# Patient Record
Sex: MALE | Race: WHITE | Employment: FULL TIME | URBAN - METROPOLITAN AREA
[De-identification: names, ages, dates, MRNs, and addresses within clinical notes are randomized per-mention and may not be internally consistent; named-entity substitution may affect disease eponyms.]

---

## 2017-01-05 ENCOUNTER — HOSPITAL ENCOUNTER (OUTPATIENT)
Dept: PHYSICAL THERAPY | Age: 62
Discharge: HOME | End: 2017-01-05
Admitting: ORTHOPAEDIC SURGERY

## 2017-01-13 ENCOUNTER — OFFICE VISIT (OUTPATIENT)
Dept: ORTHOPEDIC SURGERY | Age: 62
End: 2017-01-13

## 2017-01-13 ENCOUNTER — HOSPITAL ENCOUNTER (OUTPATIENT)
Dept: PHYSICAL THERAPY | Age: 62
Discharge: HOME | End: 2017-01-13
Admitting: ORTHOPAEDIC SURGERY

## 2017-01-13 VITALS
HEART RATE: 89 BPM | HEIGHT: 70 IN | SYSTOLIC BLOOD PRESSURE: 130 MMHG | BODY MASS INDEX: 26.48 KG/M2 | DIASTOLIC BLOOD PRESSURE: 80 MMHG | WEIGHT: 185 LBS

## 2017-01-13 DIAGNOSIS — Z98.890 S/P ROTATOR CUFF REPAIR: Primary | ICD-10-CM

## 2017-01-13 PROCEDURE — 99024 POSTOP FOLLOW-UP VISIT: CPT | Performed by: ORTHOPAEDIC SURGERY

## 2017-01-16 ENCOUNTER — OFFICE VISIT (OUTPATIENT)
Dept: FAMILY MEDICINE CLINIC | Age: 62
End: 2017-01-16

## 2017-01-16 VITALS
DIASTOLIC BLOOD PRESSURE: 80 MMHG | SYSTOLIC BLOOD PRESSURE: 140 MMHG | OXYGEN SATURATION: 96 % | HEART RATE: 93 BPM | WEIGHT: 182.25 LBS | RESPIRATION RATE: 12 BRPM | BODY MASS INDEX: 26.15 KG/M2

## 2017-01-16 DIAGNOSIS — L03.211 CELLULITIS AND ABSCESS OF FACE: Primary | ICD-10-CM

## 2017-01-16 DIAGNOSIS — L02.01 CELLULITIS AND ABSCESS OF FACE: Primary | ICD-10-CM

## 2017-01-16 PROCEDURE — 99213 OFFICE O/P EST LOW 20 MIN: CPT | Performed by: FAMILY MEDICINE

## 2017-01-16 RX ORDER — SULFAMETHOXAZOLE AND TRIMETHOPRIM 800; 160 MG/1; MG/1
1 TABLET ORAL 2 TIMES DAILY
Qty: 14 TABLET | Refills: 0 | Status: SHIPPED | OUTPATIENT
Start: 2017-01-16 | End: 2017-01-23

## 2017-01-20 ENCOUNTER — HOSPITAL ENCOUNTER (OUTPATIENT)
Dept: PHYSICAL THERAPY | Age: 62
Discharge: HOME | End: 2017-01-20
Admitting: ORTHOPAEDIC SURGERY

## 2017-01-27 ENCOUNTER — HOSPITAL ENCOUNTER (OUTPATIENT)
Dept: PHYSICAL THERAPY | Age: 62
Discharge: HOME | End: 2017-01-27
Admitting: ORTHOPAEDIC SURGERY

## 2017-02-03 ENCOUNTER — HOSPITAL ENCOUNTER (OUTPATIENT)
Dept: PHYSICAL THERAPY | Age: 62
Discharge: HOME | End: 2017-02-03
Admitting: ORTHOPAEDIC SURGERY

## 2017-04-14 ENCOUNTER — OFFICE VISIT (OUTPATIENT)
Dept: ORTHOPEDIC SURGERY | Age: 62
End: 2017-04-14

## 2017-04-14 VITALS
DIASTOLIC BLOOD PRESSURE: 83 MMHG | HEIGHT: 70 IN | HEART RATE: 89 BPM | SYSTOLIC BLOOD PRESSURE: 139 MMHG | BODY MASS INDEX: 26.48 KG/M2 | WEIGHT: 185 LBS

## 2017-04-14 DIAGNOSIS — Z98.890 S/P ROTATOR CUFF REPAIR: Primary | ICD-10-CM

## 2017-04-14 PROCEDURE — 99212 OFFICE O/P EST SF 10 MIN: CPT | Performed by: ORTHOPAEDIC SURGERY

## 2017-05-17 ENCOUNTER — OFFICE VISIT (OUTPATIENT)
Dept: ENT CLINIC | Age: 62
End: 2017-05-17

## 2017-05-17 VITALS
WEIGHT: 185 LBS | BODY MASS INDEX: 26.48 KG/M2 | DIASTOLIC BLOOD PRESSURE: 80 MMHG | HEART RATE: 74 BPM | SYSTOLIC BLOOD PRESSURE: 129 MMHG | HEIGHT: 70 IN

## 2017-05-17 DIAGNOSIS — Z51.81 ENCOUNTER FOR MONITORING DIURETIC THERAPY: ICD-10-CM

## 2017-05-17 DIAGNOSIS — H81.09 MENIERE'S DISEASE, COCHLEOVESTIBULAR, ACTIVE, UNSPECIFIED LATERALITY: Primary | ICD-10-CM

## 2017-05-17 DIAGNOSIS — Z79.899 ENCOUNTER FOR MONITORING DIURETIC THERAPY: ICD-10-CM

## 2017-05-17 PROCEDURE — 99214 OFFICE O/P EST MOD 30 MIN: CPT | Performed by: OTOLARYNGOLOGY

## 2017-05-17 RX ORDER — TRIAMTERENE AND HYDROCHLOROTHIAZIDE 37.5; 25 MG/1; MG/1
CAPSULE ORAL
Qty: 30 CAPSULE | Refills: 5 | Status: SHIPPED | OUTPATIENT
Start: 2017-06-04 | End: 2017-12-05 | Stop reason: SDUPTHER

## 2017-05-23 LAB — POTASSIUM SERPL-SCNC: 4.7 MMOL/L (ref 3.5–5.3)

## 2017-08-15 ENCOUNTER — OFFICE VISIT (OUTPATIENT)
Dept: FAMILY MEDICINE CLINIC | Age: 62
End: 2017-08-15

## 2017-08-15 VITALS
BODY MASS INDEX: 26.4 KG/M2 | SYSTOLIC BLOOD PRESSURE: 118 MMHG | OXYGEN SATURATION: 98 % | WEIGHT: 184 LBS | DIASTOLIC BLOOD PRESSURE: 82 MMHG | HEART RATE: 79 BPM | RESPIRATION RATE: 12 BRPM

## 2017-08-15 DIAGNOSIS — L98.9 SKIN LESION: Primary | ICD-10-CM

## 2017-08-15 PROCEDURE — 11200 RMVL SKIN TAGS UP TO&INC 15: CPT | Performed by: FAMILY MEDICINE

## 2017-08-24 LAB
ALBUMIN SERPL-MCNC: 4 G/DL (ref 3.5–5.7)
ALP BLD-CCNC: 58 U/L (ref 36–125)
ALT SERPL-CCNC: 32 U/L (ref 7–52)
ANION GAP SERPL CALCULATED.3IONS-SCNC: 7 MMOL/L (ref 3–16)
AST SERPL-CCNC: 22 U/L (ref 13–39)
BILIRUB SERPL-MCNC: 0.5 MG/DL (ref 0–1.5)
BUN BLDV-MCNC: 23 MG/DL (ref 7–25)
CALCIUM SERPL-MCNC: 9.6 MG/DL (ref 8.6–10.3)
CHLORIDE BLD-SCNC: 103 MMOL/L (ref 98–110)
CHOLESTEROL, TOTAL: 209 MG/DL (ref 0–200)
CO2: 32 MMOL/L (ref 21–33)
CREAT SERPL-MCNC: 0.93 MG/DL (ref 0.6–1.3)
GFR, ESTIMATED: 88 SEE NOTE.
GFR, ESTIMATED: >90 SEE NOTE.
GLUCOSE BLD-MCNC: 86 MG/DL (ref 70–100)
HBA1C MFR BLD: 5.8 % (ref 4.8–6.4)
HDLC SERPL-MCNC: 41 MG/DL (ref 60–92)
LDL CHOLESTEROL CALCULATED: 92 MG/DL
OSMOLALITY CALCULATION: 297 MOSM/KG (ref 278–305)
POTASSIUM SERPL-SCNC: 4.2 MMOL/L (ref 3.5–5.3)
SODIUM BLD-SCNC: 142 MMOL/L (ref 133–146)
TOTAL PROTEIN: 6.7 G/DL (ref 6.4–8.9)
TRIGL SERPL-MCNC: 381 MG/DL (ref 10–149)

## 2017-08-30 ENCOUNTER — OFFICE VISIT (OUTPATIENT)
Dept: FAMILY MEDICINE CLINIC | Age: 62
End: 2017-08-30

## 2017-08-30 VITALS
HEART RATE: 78 BPM | RESPIRATION RATE: 12 BRPM | DIASTOLIC BLOOD PRESSURE: 76 MMHG | OXYGEN SATURATION: 98 % | HEIGHT: 70 IN | WEIGHT: 183.25 LBS | BODY MASS INDEX: 26.23 KG/M2 | SYSTOLIC BLOOD PRESSURE: 110 MMHG

## 2017-08-30 DIAGNOSIS — Z00.00 WELL ADULT EXAM: Primary | ICD-10-CM

## 2017-08-30 PROCEDURE — 99396 PREV VISIT EST AGE 40-64: CPT | Performed by: FAMILY MEDICINE

## 2017-08-30 ASSESSMENT — PATIENT HEALTH QUESTIONNAIRE - PHQ9
SUM OF ALL RESPONSES TO PHQ9 QUESTIONS 1 & 2: 0
2. FEELING DOWN, DEPRESSED OR HOPELESS: 0
1. LITTLE INTEREST OR PLEASURE IN DOING THINGS: 0
SUM OF ALL RESPONSES TO PHQ QUESTIONS 1-9: 0

## 2017-11-13 ENCOUNTER — OFFICE VISIT (OUTPATIENT)
Dept: FAMILY MEDICINE CLINIC | Age: 62
End: 2017-11-13

## 2017-11-13 VITALS
TEMPERATURE: 98.3 F | OXYGEN SATURATION: 98 % | HEART RATE: 90 BPM | SYSTOLIC BLOOD PRESSURE: 120 MMHG | WEIGHT: 182 LBS | DIASTOLIC BLOOD PRESSURE: 80 MMHG | BODY MASS INDEX: 26.11 KG/M2

## 2017-11-13 DIAGNOSIS — J01.90 ACUTE BACTERIAL SINUSITIS: Primary | ICD-10-CM

## 2017-11-13 DIAGNOSIS — B96.89 ACUTE BACTERIAL SINUSITIS: Primary | ICD-10-CM

## 2017-11-13 PROCEDURE — 99213 OFFICE O/P EST LOW 20 MIN: CPT | Performed by: FAMILY MEDICINE

## 2017-11-13 RX ORDER — AMOXICILLIN 500 MG/1
1000 CAPSULE ORAL 2 TIMES DAILY
Qty: 40 CAPSULE | Refills: 0 | Status: SHIPPED | OUTPATIENT
Start: 2017-11-13 | End: 2017-11-23

## 2017-11-13 ASSESSMENT — ENCOUNTER SYMPTOMS
SINUS PAIN: 1
SORE THROAT: 1

## 2017-11-13 NOTE — PROGRESS NOTES
Subjective:      Patient ID: Yecenia Rowland is a 58 y.o. male. Patient presents for acute medical problem. Medical assistant notes reviewed. URI    This is a new problem. Episode onset: since Thursday. Associated symptoms include congestion, sinus pain and a sore throat. Associated symptoms comments: Green nasal drainage with blood. Patient is taking over-the-counter medications with no improvement for last 5 days. Patient originally started with sore throat on one side and on the opposite. Review of Systems   HENT: Positive for congestion, sinus pain and sore throat. Allergies   Allergen Reactions    Seasonal      sneezing         Objective:   Physical Exam   Constitutional: He appears well-developed and well-nourished. He is cooperative. No distress. HENT:   Right Ear: Tympanic membrane and ear canal normal.   Left Ear: Tympanic membrane and ear canal normal.   Nose: Mucosal edema and rhinorrhea (purulent) present. Right sinus exhibits frontal sinus tenderness. Right sinus exhibits no maxillary sinus tenderness. Left sinus exhibits frontal sinus tenderness. Left sinus exhibits no maxillary sinus tenderness. Mouth/Throat: Oropharynx is clear and moist and mucous membranes are normal.   Pulmonary/Chest: Effort normal and breath sounds normal.   Lymphadenopathy:     He has cervical adenopathy. Neurological: He is alert. Assessment:      Neel Hendrickson was seen today for uri. Diagnoses and all orders for this visit:    Acute bacterial sinusitis    Other orders  -     amoxicillin (AMOXIL) 500 MG capsule; Take 2 capsules by mouth 2 times daily for 10 days            Plan:      Tylenol when necessary  May continue over-the-counter decongestants  Humidification of the bedroom was discussed.   RTC when necessary

## 2017-12-05 DIAGNOSIS — H81.09 MENIERE'S DISEASE, COCHLEOVESTIBULAR, ACTIVE, UNSPECIFIED LATERALITY: ICD-10-CM

## 2017-12-05 NOTE — TELEPHONE ENCOUNTER
[facility] pharmacy is requesting refills on patient's Triamterene. Patient's LOV was on 05/17/17, the plan at that time was for patient to return in one year around 05/17/2018. Return in about 1 year (around 5/17/2018) for recheck/follow-up, and sooner if condition worsens.

## 2017-12-12 RX ORDER — TRIAMTERENE AND HYDROCHLOROTHIAZIDE 37.5; 25 MG/1; MG/1
CAPSULE ORAL
Qty: 30 CAPSULE | Refills: 4 | Status: SHIPPED | OUTPATIENT
Start: 2017-12-12 | End: 2018-04-29 | Stop reason: SDUPTHER

## 2017-12-12 NOTE — TELEPHONE ENCOUNTER
Patient called and said that [facility] is trying to contact Dr. Timothy Peterson to approve his refill of the Dyazide. He is now completely out and he wants to avoid having a Meniere's episode. The medication has been working great. I told him I would ask Dr. Timothy Peterson when he is done with his morning patients. The patient's number is [phone] and he would like a call back.

## 2018-04-30 ENCOUNTER — TELEPHONE (OUTPATIENT)
Dept: ENT CLINIC | Age: 63
End: 2018-04-30

## 2018-05-08 ENCOUNTER — OFFICE VISIT (OUTPATIENT)
Dept: ENT CLINIC | Age: 63
End: 2018-05-08

## 2018-05-08 VITALS — SYSTOLIC BLOOD PRESSURE: 126 MMHG | DIASTOLIC BLOOD PRESSURE: 66 MMHG | HEART RATE: 80 BPM

## 2018-05-08 DIAGNOSIS — H81.09 MENIERE'S DISEASE, COCHLEOVESTIBULAR, ACTIVE, UNSPECIFIED LATERALITY: Primary | ICD-10-CM

## 2018-05-08 PROCEDURE — 99213 OFFICE O/P EST LOW 20 MIN: CPT | Performed by: OTOLARYNGOLOGY

## 2018-05-30 ENCOUNTER — TELEPHONE (OUTPATIENT)
Dept: ENT CLINIC | Age: 63
End: 2018-05-30

## 2018-08-15 ENCOUNTER — TELEPHONE (OUTPATIENT)
Dept: FAMILY MEDICINE CLINIC | Age: 63
End: 2018-08-15

## 2018-08-15 NOTE — TELEPHONE ENCOUNTER
Spoke with patient today wanted to check and see if he needed a sooner appointment due to possible hernia. Patient states he's doing \"fine, not in a lot of pain only when he coughs\" advised him to please call office if sx's worsen or pain increases.

## 2018-08-15 NOTE — TELEPHONE ENCOUNTER
Pt has appt scheduled for 9/7 for possible hernia is this ok to wait until then to be seen?     Please advise

## 2018-09-07 ENCOUNTER — OFFICE VISIT (OUTPATIENT)
Dept: FAMILY MEDICINE CLINIC | Age: 63
End: 2018-09-07

## 2018-09-07 VITALS
SYSTOLIC BLOOD PRESSURE: 102 MMHG | DIASTOLIC BLOOD PRESSURE: 72 MMHG | OXYGEN SATURATION: 95 % | HEART RATE: 96 BPM | BODY MASS INDEX: 26.83 KG/M2 | WEIGHT: 187 LBS

## 2018-09-07 DIAGNOSIS — K40.90 LEFT INGUINAL HERNIA: Primary | ICD-10-CM

## 2018-09-07 PROCEDURE — 99213 OFFICE O/P EST LOW 20 MIN: CPT | Performed by: FAMILY MEDICINE

## 2018-09-24 ENCOUNTER — PREP FOR PROCEDURE (OUTPATIENT)
Dept: SURGERY | Age: 63
End: 2018-09-24

## 2018-09-24 ENCOUNTER — OFFICE VISIT (OUTPATIENT)
Dept: SURGERY | Age: 63
End: 2018-09-24
Payer: COMMERCIAL

## 2018-09-24 VITALS
SYSTOLIC BLOOD PRESSURE: 140 MMHG | BODY MASS INDEX: 26.92 KG/M2 | DIASTOLIC BLOOD PRESSURE: 80 MMHG | HEIGHT: 70 IN | WEIGHT: 188 LBS

## 2018-09-24 DIAGNOSIS — K40.90 LEFT INGUINAL HERNIA: Primary | ICD-10-CM

## 2018-09-24 PROCEDURE — 99204 OFFICE O/P NEW MOD 45 MIN: CPT | Performed by: SURGERY

## 2018-09-24 RX ORDER — SODIUM CHLORIDE 0.9 % (FLUSH) 0.9 %
10 SYRINGE (ML) INJECTION EVERY 12 HOURS SCHEDULED
Status: CANCELLED | OUTPATIENT
Start: 2018-09-24 | End: 2019-09-24

## 2018-09-24 RX ORDER — SODIUM CHLORIDE 0.9 % (FLUSH) 0.9 %
10 SYRINGE (ML) INJECTION PRN
Status: CANCELLED | OUTPATIENT
Start: 2018-09-24 | End: 2019-09-24

## 2018-09-24 ASSESSMENT — ENCOUNTER SYMPTOMS
COLOR CHANGE: 0
SINUS PRESSURE: 1
ABDOMINAL DISTENTION: 0
ABDOMINAL PAIN: 0
APNEA: 0
CHEST TIGHTNESS: 0
BACK PAIN: 0
EYE DISCHARGE: 0
EYE ITCHING: 0
SINUS PAIN: 0

## 2018-09-24 NOTE — PROGRESS NOTES
[facility]  SUBJECTIVE:    Chief Complaint: Left inguinal hernia    Lupe Pulse   1955   61 y.o. male is referred by his primary care physician Dr. Ricky Nowak with a left inguinal hernia and it is noticed for the last several months. Occasionally the patient has some urinary urgency in the morning associated with protrusion of the hernia that is relieved with urination. Otherwise other than the presence of the inguinal bulge, he has no other symptoms. It is not painful and is always reducible. It is in no way lifestyle limiting. Patient denies fevers, chills, nausea, vomiting, jaundice, dysuria, change in appetite, weight, or bowel movements. He's had a right inguinal hernia repaired in the past by open technique and no subsequent sequela other than intermittent numbness and a tugging sensation. He's had no other abdominal surgery. He has no significant medical conditions. He does not smoke and drinks occasional alcohol    Review of Systems   Constitutional: Negative for activity change and appetite change. HENT: Positive for hearing loss and sinus pressure. Negative for sinus pain. Eyes: Negative for discharge and itching. Respiratory: Negative for apnea and chest tightness. Cardiovascular: Negative for chest pain and leg swelling. Gastrointestinal: Negative for abdominal distention and abdominal pain. Endocrine: Negative for cold intolerance and heat intolerance. Genitourinary: Negative for difficulty urinating and dysuria. Musculoskeletal: Negative for arthralgias and back pain. Skin: Negative for color change and pallor. Allergic/Immunologic: Negative for environmental allergies and food allergies. Neurological: Positive for headaches. Negative for dizziness. Hematological: Negative for adenopathy. Does not bruise/bleed easily. Psychiatric/Behavioral: Negative for agitation and behavioral problems.        Past Medical History: There is no tenderness. There is no rebound and no guarding. Lymphadenopathy:     He has no cervical adenopathy. Neurological: He is alert and oriented to person, place, and time. Skin: Skin is warm and dry. No rash noted. He is not diaphoretic. No erythema. Psychiatric: He has a normal mood and affect. His behavior is normal. Judgment and thought content normal.        Labs:  No visits with results within 6 Week(s) from this visit. Latest known visit with results is:   Office Visit on 08/15/2017   Component Date Value Ref Range Status    Hemoglobin A1C 08/24/2017 5.8  4.8 - 6.4 % Final    Comment: Hemoglobin (Hb) A1C Normal:  4.8 - 5.6%  Increased Risk for Diabetes: 5.7 - 6.4%  Diagnostic Diabetes:         >/=   6.5%  (Drawn on 2 separate occasions)  Glycemic Control for Adults with Diabetes: <7.0%  (DCCT / NGSP)      Sodium 08/24/2017 142  133 - 146 mmol/L Final    Potassium 08/24/2017 4.2  3.5 - 5.3 mmol/L Final    Chloride 08/24/2017 103  98 - 110 mmol/L Final    CO2 08/24/2017 32  21 - 33 mmol/L Final    Anion Gap 08/24/2017 7  3 - 16 mmol/L Final    BUN 08/24/2017 23  7 - 25 mg/dL Final    CREATININE 08/24/2017 0.93  0.60 - 1.30 mg/dL Final    Glucose 08/24/2017 86  70 - 100 mg/dL Final    Calcium 08/24/2017 9.6  8.6 - 10.3 mg/dL Final    Total Bilirubin 08/24/2017 0.5  0.0 - 1.5 mg/dL Final    AST 08/24/2017 22  13 - 39 U/L Final    ALT 08/24/2017 32  7 - 52 U/L Final    Alkaline Phosphatase 08/24/2017 58  36 - 125 U/L Final    Total Protein 08/24/2017 6.7  6.4 - 8.9 g/dL Final    Alb 08/24/2017 4.0  3.5 - 5.7 g/dL Final    Osmolality Calc 08/24/2017 297  278 - 305 mOsm/kg Final    GFR, Estimated 08/24/2017 >90  See note. Final    GFR, Estimated 08/24/2017 88  See note.  Final    Cholesterol, Total 08/24/2017 209* 0 - 200 mg/dL Final    Triglycerides 08/24/2017 381* 10 - 149 mg/dL Final    HDL 08/24/2017 41* 60 - 92 mg/dL Final    Comment:       LIPID PROFILE INTERPRETATION CHOLESTEROL,TOTAL(mg/dL)                                              DESIRABLE:                            < 200                                    BORDERLINE HIGH RISK:                 200 - 239                                HIGH RISK(UNDESIRABLE):               =/> 240                                      LDL CHOLESTEROL(mg/dL)                                               OPTIMAL:                              < 100                                    NEAR HIGH OPTIMAL:                    100 - 129                                BORDERLINE HIGH RISK:                 130 - 159                                HIGH RISK:                            160 - 189                                VERY HIGH RISK:                       =/> 190                                      HDL CHOLESTEROL(mg/dL)                                             HIGH RISK(UNDESIRABLE):               < 40                                     BOR                           DERLINE:                           40 - 59                                  LOW RISK (DESIRABLE):                 => 60                                        TRIGLYCERIDES (mg/dL)                                              NORMAL(DESIRABLE):                    < 150                                    BORDERLINE HIGH RISK:                 150 - 199                                HIGH RISK:                            200 - 499                                VERY HIGH RISK:                       =/> 500                                  Based on the guidlines of the [employer] Cholesterol                             Education Program (NCEP). Assumes sample obtained after a 9- to 12- hour fast.      LDL Calculated 08/24/2017 92  mg/dL Final       Imaging:  No results found. ASSESSMENT:  Reducible left inguinal hernia  Reducible umbilical hernia    PLAN:  1.  We discussed the risks of surgery including bleeding, infection, damage to surrounding structures, conversion to open, hernia recurrence, chronic pain as well as anesthesia related complications. Increased risk of recurrence is associated with smoking, diabetes, obesity as well as heavy lifting over 20lbs sooner than 6 weeks after the surgery. We also discussed minimally invasive vs open technique. The benefits of the procedure include repair of the hernia, prevention of future incarceration and return to normal daily activity. Alternatives discussed include close observation. Details of the procedure were discussed and all questions answered. The patient understands, agrees, and wishes to proceed with minimally invasive procedure  2. Warning signs of an incarcerated hernia include inability to reduce the bulge, increased and constant pain, nausea, vomiting, fevers, chills and constipation. This is a surgical emergency and the patient should contact the office or present to an emergency department  3. Will schedule for robot assisted laparoscopic left and possible bilateral inguinal hernia repair with mesh and open umbilical hernia repair with possible mesh with general anesthesia and as outpatient procedure  4. The patient is not currently smoking. Recommend maintaining a smoke-free lifestyle. John Chun 6  Sarina Boland MD, FACS  9/24/2018  10:40 AM

## 2018-09-24 NOTE — PATIENT INSTRUCTIONS
1. We discussed the risks of surgery including bleeding, infection, damage to surrounding structures, conversion to open, hernia recurrence, chronic pain as well as anesthesia related complications. Increased risk of recurrence is associated with smoking, diabetes, obesity as well as heavy lifting over 20lbs sooner than 6 weeks after the surgery. We also discussed minimally invasive vs open technique. The benefits of the procedure include repair of the hernia, prevention of future incarceration and return to normal daily activity. Alternatives discussed include close observation. Details of the procedure were discussed and all questions answered. The patient understands, agrees, and wishes to proceed with minimally invasive procedure  2. Warning signs of an incarcerated hernia include inability to reduce the bulge, increased and constant pain, nausea, vomiting, fevers, chills and constipation. This is a surgical emergency and the patient should contact the office or present to an emergency department  3.  Will schedule for robot assisted laparoscopic left and possible bilateral inguinal hernia repair with mesh and open umbilical hernia repair with possible mesh with general anesthesia and as outpatient procedure

## 2018-10-25 NOTE — PROGRESS NOTES
piercing jewelry must be removed. 11. If you have ___dentures, they will be removed before going to the OR; we will provide you a container. If you wear ___contact lenses or ___glasses, they will be removed; please bring a case for them. 12. Please see your family doctor/pediatrician for a history & physical and/or concerning medications. Bring any test results/reports from your physician's office. PCP__________________Phone___________H&P Appt. Date________             13 If you  have a Living Will and Durable Power of  for Healthcare, please bring in a copy. 15. Notify your Surgeon if you develop any illness between now and surgery  time, cough, cold, fever, sore throat, nausea, vomiting, etc.  Please notify your surgeon if you experience dizziness, shortness of breath or blurred vision between now & the time of your surgery             15. DO NOT shave your operative site 96 hours prior to surgery. For face & neck surgery, men may use an electric razor 48 hours prior to surgery. 16. Shower the night before surgery with _x__Antibacterial soap ___Hibiclens             17. To provide excellent care visitors will be limited to one in the room at any given time. 18.  Please bring picture ID and insurance card. 19.  Visit our web site for additional information:  [email or web address]              20.During flu season no children under the age of 15 are permitted in the hospital for the safety of all patients. 21. If you take a long acting insulin in the evening only  take half of your usual  dose the night  before your procedure              22. If you use a c-pap please bring DOS if staying overnight,             23.For your convenience [facility] has a pharmacy on site to fill your prescriptions.              24. If you use oxygen and have a portable tank please bring it  with you the DOS             25. Bring a complete list of all your medications with name and dose include any supplements. 26. Other__________________________________________   *Please call pre admission testing if you any further questions   [facility]   [address]    [facility]  [phone]   [address] [place] [address]       All above information reviewed with patient in person or by phone. Patient verbalizes understanding. All questions and concerns addressed.                                                                                                  Patient/Rep__patient__________________                                                                                                                                    PRE OP INSTRUCTIONS

## 2018-10-29 ENCOUNTER — ANESTHESIA EVENT (OUTPATIENT)
Dept: OPERATING ROOM | Age: 63
End: 2018-10-29
Payer: COMMERCIAL

## 2018-10-30 ENCOUNTER — ANESTHESIA (OUTPATIENT)
Dept: OPERATING ROOM | Age: 63
End: 2018-10-30
Payer: COMMERCIAL

## 2018-10-30 ENCOUNTER — HOSPITAL ENCOUNTER (OUTPATIENT)
Age: 63
Setting detail: OUTPATIENT SURGERY
Discharge: HOME | End: 2018-10-30
Attending: SURGERY | Admitting: SURGERY
Payer: COMMERCIAL

## 2018-10-30 VITALS
RESPIRATION RATE: 16 BRPM | SYSTOLIC BLOOD PRESSURE: 118 MMHG | DIASTOLIC BLOOD PRESSURE: 62 MMHG | BODY MASS INDEX: 25.93 KG/M2 | HEART RATE: 84 BPM | OXYGEN SATURATION: 98 % | TEMPERATURE: 97.8 F | WEIGHT: 185.19 LBS | HEIGHT: 71 IN

## 2018-10-30 VITALS
OXYGEN SATURATION: 100 % | RESPIRATION RATE: 24 BRPM | TEMPERATURE: 97.2 F | SYSTOLIC BLOOD PRESSURE: 107 MMHG | DIASTOLIC BLOOD PRESSURE: 56 MMHG

## 2018-10-30 DIAGNOSIS — K40.90 NON-RECURRENT UNILATERAL INGUINAL HERNIA WITHOUT OBSTRUCTION OR GANGRENE: Primary | ICD-10-CM

## 2018-10-30 PROCEDURE — 7100000010 HC PHASE II RECOVERY - FIRST 15 MIN: Performed by: SURGERY

## 2018-10-30 PROCEDURE — 2580000003 HC RX 258

## 2018-10-30 PROCEDURE — 2500000003 HC RX 250 WO HCPCS: Performed by: NURSE ANESTHETIST, CERTIFIED REGISTERED

## 2018-10-30 PROCEDURE — 7100000011 HC PHASE II RECOVERY - ADDTL 15 MIN: Performed by: SURGERY

## 2018-10-30 PROCEDURE — 6360000002 HC RX W HCPCS: Performed by: NURSE ANESTHETIST, CERTIFIED REGISTERED

## 2018-10-30 PROCEDURE — 3700000001 HC ADD 15 MINUTES (ANESTHESIA): Performed by: SURGERY

## 2018-10-30 PROCEDURE — C1781 MESH (IMPLANTABLE): HCPCS | Performed by: SURGERY

## 2018-10-30 PROCEDURE — 49650 LAP ING HERNIA REPAIR INIT: CPT | Performed by: SURGERY

## 2018-10-30 PROCEDURE — 2500000003 HC RX 250 WO HCPCS: Performed by: SURGERY

## 2018-10-30 PROCEDURE — 3700000000 HC ANESTHESIA ATTENDED CARE: Performed by: SURGERY

## 2018-10-30 PROCEDURE — 2580000003 HC RX 258: Performed by: SURGERY

## 2018-10-30 PROCEDURE — 7100000001 HC PACU RECOVERY - ADDTL 15 MIN: Performed by: SURGERY

## 2018-10-30 PROCEDURE — 7100000000 HC PACU RECOVERY - FIRST 15 MIN: Performed by: SURGERY

## 2018-10-30 PROCEDURE — 49585 REPAIR UMBILICAL HERN,5+Y/O,REDUC: CPT | Performed by: SURGERY

## 2018-10-30 PROCEDURE — 3600000019 HC SURGERY ROBOT ADDTL 15MIN: Performed by: SURGERY

## 2018-10-30 PROCEDURE — S2900 ROBOTIC SURGICAL SYSTEM: HCPCS | Performed by: SURGERY

## 2018-10-30 PROCEDURE — 6360000002 HC RX W HCPCS

## 2018-10-30 PROCEDURE — 3600000009 HC SURGERY ROBOT BASE: Performed by: SURGERY

## 2018-10-30 PROCEDURE — 2580000003 HC RX 258: Performed by: NURSE ANESTHETIST, CERTIFIED REGISTERED

## 2018-10-30 PROCEDURE — 2709999900 HC NON-CHARGEABLE SUPPLY: Performed by: SURGERY

## 2018-10-30 DEVICE — MESH HERN W10XL15CM POLY POLYLACTIC ACID 70% CLLGN 30% GLYC: Type: IMPLANTABLE DEVICE | Site: ABDOMEN | Status: FUNCTIONAL

## 2018-10-30 RX ORDER — SODIUM CHLORIDE 0.9 % (FLUSH) 0.9 %
10 SYRINGE (ML) INJECTION PRN
Status: DISCONTINUED | OUTPATIENT
Start: 2018-10-30 | End: 2018-10-30 | Stop reason: HOSPADM

## 2018-10-30 RX ORDER — FENTANYL CITRATE 50 UG/ML
50 INJECTION, SOLUTION INTRAMUSCULAR; INTRAVENOUS EVERY 5 MIN PRN
Status: DISCONTINUED | OUTPATIENT
Start: 2018-10-30 | End: 2018-10-30 | Stop reason: HOSPADM

## 2018-10-30 RX ORDER — HYDROMORPHONE HCL 110MG/55ML
PATIENT CONTROLLED ANALGESIA SYRINGE INTRAVENOUS PRN
Status: DISCONTINUED | OUTPATIENT
Start: 2018-10-30 | End: 2018-10-30 | Stop reason: SDUPTHER

## 2018-10-30 RX ORDER — MEPERIDINE HYDROCHLORIDE 25 MG/ML
12.5 INJECTION INTRAMUSCULAR; INTRAVENOUS; SUBCUTANEOUS EVERY 5 MIN PRN
Status: DISCONTINUED | OUTPATIENT
Start: 2018-10-30 | End: 2018-10-30 | Stop reason: HOSPADM

## 2018-10-30 RX ORDER — ONDANSETRON 2 MG/ML
INJECTION INTRAMUSCULAR; INTRAVENOUS PRN
Status: DISCONTINUED | OUTPATIENT
Start: 2018-10-30 | End: 2018-10-30 | Stop reason: SDUPTHER

## 2018-10-30 RX ORDER — OXYCODONE HYDROCHLORIDE 5 MG/1
5 TABLET ORAL EVERY 4 HOURS PRN
Qty: 30 TABLET | Refills: 0 | Status: SHIPPED | OUTPATIENT
Start: 2018-10-30 | End: 2018-11-06

## 2018-10-30 RX ORDER — PROPOFOL 10 MG/ML
INJECTION, EMULSION INTRAVENOUS PRN
Status: DISCONTINUED | OUTPATIENT
Start: 2018-10-30 | End: 2018-10-30 | Stop reason: SDUPTHER

## 2018-10-30 RX ORDER — MAGNESIUM HYDROXIDE 1200 MG/15ML
LIQUID ORAL CONTINUOUS PRN
Status: DISCONTINUED | OUTPATIENT
Start: 2018-10-30 | End: 2018-10-30 | Stop reason: HOSPADM

## 2018-10-30 RX ORDER — EPHEDRINE SULFATE 50 MG/ML
INJECTION INTRAVENOUS PRN
Status: DISCONTINUED | OUTPATIENT
Start: 2018-10-30 | End: 2018-10-30 | Stop reason: SDUPTHER

## 2018-10-30 RX ORDER — NEOSTIGMINE METHYLSULFATE 5 MG/5 ML
SYRINGE (ML) INTRAVENOUS PRN
Status: DISCONTINUED | OUTPATIENT
Start: 2018-10-30 | End: 2018-10-30 | Stop reason: SDUPTHER

## 2018-10-30 RX ORDER — HYDROMORPHONE HCL 110MG/55ML
0.25 PATIENT CONTROLLED ANALGESIA SYRINGE INTRAVENOUS EVERY 5 MIN PRN
Status: DISCONTINUED | OUTPATIENT
Start: 2018-10-30 | End: 2018-10-30 | Stop reason: HOSPADM

## 2018-10-30 RX ORDER — NEOSTIGMINE METHYLSULFATE 5 MG/5 ML
SYRINGE (ML) INTRAVENOUS PRN
Status: DISCONTINUED | OUTPATIENT
Start: 2018-10-30 | End: 2018-10-30

## 2018-10-30 RX ORDER — OXYCODONE HYDROCHLORIDE 5 MG/1
10 TABLET ORAL PRN
Status: DISCONTINUED | OUTPATIENT
Start: 2018-10-30 | End: 2018-10-30 | Stop reason: HOSPADM

## 2018-10-30 RX ORDER — SODIUM CHLORIDE 9 MG/ML
INJECTION, SOLUTION INTRAVENOUS CONTINUOUS PRN
Status: DISCONTINUED | OUTPATIENT
Start: 2018-10-30 | End: 2018-10-30 | Stop reason: SDUPTHER

## 2018-10-30 RX ORDER — OXYCODONE HYDROCHLORIDE 5 MG/1
5 TABLET ORAL PRN
Status: DISCONTINUED | OUTPATIENT
Start: 2018-10-30 | End: 2018-10-30 | Stop reason: HOSPADM

## 2018-10-30 RX ORDER — DEXAMETHASONE SODIUM PHOSPHATE 4 MG/ML
INJECTION, SOLUTION INTRA-ARTICULAR; INTRALESIONAL; INTRAMUSCULAR; INTRAVENOUS; SOFT TISSUE PRN
Status: DISCONTINUED | OUTPATIENT
Start: 2018-10-30 | End: 2018-10-30 | Stop reason: SDUPTHER

## 2018-10-30 RX ORDER — LIDOCAINE HYDROCHLORIDE 20 MG/ML
INJECTION, SOLUTION INFILTRATION; PERINEURAL PRN
Status: DISCONTINUED | OUTPATIENT
Start: 2018-10-30 | End: 2018-10-30 | Stop reason: SDUPTHER

## 2018-10-30 RX ORDER — HYDROMORPHONE HCL 110MG/55ML
0.5 PATIENT CONTROLLED ANALGESIA SYRINGE INTRAVENOUS EVERY 5 MIN PRN
Status: DISCONTINUED | OUTPATIENT
Start: 2018-10-30 | End: 2018-10-30 | Stop reason: HOSPADM

## 2018-10-30 RX ORDER — SODIUM CHLORIDE 0.9 % (FLUSH) 0.9 %
10 SYRINGE (ML) INJECTION EVERY 12 HOURS SCHEDULED
Status: DISCONTINUED | OUTPATIENT
Start: 2018-10-30 | End: 2018-10-30 | Stop reason: HOSPADM

## 2018-10-30 RX ORDER — GLYCOPYRROLATE 1 MG/5 ML
SYRINGE (ML) INTRAVENOUS PRN
Status: DISCONTINUED | OUTPATIENT
Start: 2018-10-30 | End: 2018-10-30 | Stop reason: SDUPTHER

## 2018-10-30 RX ORDER — PROMETHAZINE HYDROCHLORIDE 25 MG/ML
6.25 INJECTION, SOLUTION INTRAMUSCULAR; INTRAVENOUS PRN
Status: DISCONTINUED | OUTPATIENT
Start: 2018-10-30 | End: 2018-10-30 | Stop reason: HOSPADM

## 2018-10-30 RX ORDER — DIPHENHYDRAMINE HYDROCHLORIDE 50 MG/ML
12.5 INJECTION INTRAMUSCULAR; INTRAVENOUS
Status: DISCONTINUED | OUTPATIENT
Start: 2018-10-30 | End: 2018-10-30 | Stop reason: HOSPADM

## 2018-10-30 RX ORDER — LABETALOL HYDROCHLORIDE 5 MG/ML
5 INJECTION, SOLUTION INTRAVENOUS EVERY 10 MIN PRN
Status: DISCONTINUED | OUTPATIENT
Start: 2018-10-30 | End: 2018-10-30 | Stop reason: HOSPADM

## 2018-10-30 RX ORDER — ROCURONIUM BROMIDE 10 MG/ML
INJECTION, SOLUTION INTRAVENOUS PRN
Status: DISCONTINUED | OUTPATIENT
Start: 2018-10-30 | End: 2018-10-30 | Stop reason: SDUPTHER

## 2018-10-30 RX ORDER — SODIUM CHLORIDE 9 MG/ML
INJECTION, SOLUTION INTRAVENOUS
Status: COMPLETED
Start: 2018-10-30 | End: 2018-10-30

## 2018-10-30 RX ORDER — KETOROLAC TROMETHAMINE 30 MG/ML
INJECTION, SOLUTION INTRAMUSCULAR; INTRAVENOUS PRN
Status: DISCONTINUED | OUTPATIENT
Start: 2018-10-30 | End: 2018-10-30 | Stop reason: SDUPTHER

## 2018-10-30 RX ORDER — SODIUM CHLORIDE 9 MG/ML
INJECTION, SOLUTION INTRAVENOUS CONTINUOUS
Status: DISCONTINUED | OUTPATIENT
Start: 2018-10-30 | End: 2018-10-30 | Stop reason: HOSPADM

## 2018-10-30 RX ORDER — BUPIVACAINE HYDROCHLORIDE AND EPINEPHRINE 5; 5 MG/ML; UG/ML
INJECTION, SOLUTION EPIDURAL; INTRACAUDAL; PERINEURAL
Status: COMPLETED | OUTPATIENT
Start: 2018-10-30 | End: 2018-10-30

## 2018-10-30 RX ORDER — SUCCINYLCHOLINE CHLORIDE 20 MG/ML
INJECTION INTRAMUSCULAR; INTRAVENOUS PRN
Status: DISCONTINUED | OUTPATIENT
Start: 2018-10-30 | End: 2018-10-30 | Stop reason: SDUPTHER

## 2018-10-30 RX ORDER — FENTANYL CITRATE 50 UG/ML
INJECTION, SOLUTION INTRAMUSCULAR; INTRAVENOUS PRN
Status: DISCONTINUED | OUTPATIENT
Start: 2018-10-30 | End: 2018-10-30 | Stop reason: SDUPTHER

## 2018-10-30 RX ORDER — MIDAZOLAM HYDROCHLORIDE 1 MG/ML
INJECTION INTRAMUSCULAR; INTRAVENOUS PRN
Status: DISCONTINUED | OUTPATIENT
Start: 2018-10-30 | End: 2018-10-30 | Stop reason: SDUPTHER

## 2018-10-30 RX ORDER — CEFAZOLIN SODIUM 2 G/100ML
INJECTION, SOLUTION INTRAVENOUS
Status: COMPLETED
Start: 2018-10-30 | End: 2018-10-30

## 2018-10-30 RX ADMIN — FENTANYL CITRATE 50 MCG: 50 INJECTION, SOLUTION INTRAMUSCULAR; INTRAVENOUS at 07:32

## 2018-10-30 RX ADMIN — SODIUM CHLORIDE 1000 ML: 9 INJECTION, SOLUTION INTRAVENOUS at 07:16

## 2018-10-30 RX ADMIN — Medication 0.7 MG: at 08:37

## 2018-10-30 RX ADMIN — SODIUM CHLORIDE: 9 INJECTION, SOLUTION INTRAVENOUS at 08:14

## 2018-10-30 RX ADMIN — HYDROMORPHONE HYDROCHLORIDE 0.5 MG: 2 INJECTION, SOLUTION INTRAMUSCULAR; INTRAVENOUS; SUBCUTANEOUS at 08:46

## 2018-10-30 RX ADMIN — ROCURONIUM BROMIDE 30 MG: 10 INJECTION INTRAVENOUS at 07:32

## 2018-10-30 RX ADMIN — EPHEDRINE SULFATE 10 MG: 50 INJECTION INTRAVENOUS at 08:29

## 2018-10-30 RX ADMIN — HYDROMORPHONE HYDROCHLORIDE 0.5 MG: 2 INJECTION, SOLUTION INTRAMUSCULAR; INTRAVENOUS; SUBCUTANEOUS at 08:39

## 2018-10-30 RX ADMIN — FENTANYL CITRATE 50 MCG: 50 INJECTION, SOLUTION INTRAMUSCULAR; INTRAVENOUS at 08:07

## 2018-10-30 RX ADMIN — CEFAZOLIN SODIUM 2 G: 2 INJECTION, SOLUTION INTRAVENOUS at 07:16

## 2018-10-30 RX ADMIN — ROCURONIUM BROMIDE 10 MG: 10 INJECTION INTRAVENOUS at 07:58

## 2018-10-30 RX ADMIN — EPHEDRINE SULFATE 10 MG: 50 INJECTION INTRAVENOUS at 07:43

## 2018-10-30 RX ADMIN — ROCURONIUM BROMIDE 10 MG: 10 INJECTION INTRAVENOUS at 08:33

## 2018-10-30 RX ADMIN — HYDROMORPHONE HYDROCHLORIDE 0.5 MG: 2 INJECTION, SOLUTION INTRAMUSCULAR; INTRAVENOUS; SUBCUTANEOUS at 08:50

## 2018-10-30 RX ADMIN — PROPOFOL 200 MG: 10 INJECTION, EMULSION INTRAVENOUS at 07:27

## 2018-10-30 RX ADMIN — FENTANYL CITRATE 50 MCG: 50 INJECTION, SOLUTION INTRAMUSCULAR; INTRAVENOUS at 07:26

## 2018-10-30 RX ADMIN — SUCCINYLCHOLINE CHLORIDE 100 MG: 20 INJECTION, SOLUTION INTRAMUSCULAR; INTRAVENOUS at 07:28

## 2018-10-30 RX ADMIN — KETOROLAC TROMETHAMINE 30 MG: 30 INJECTION, SOLUTION INTRAMUSCULAR; INTRAVENOUS at 08:37

## 2018-10-30 RX ADMIN — FENTANYL CITRATE 50 MCG: 50 INJECTION, SOLUTION INTRAMUSCULAR; INTRAVENOUS at 08:33

## 2018-10-30 RX ADMIN — Medication 4 MG: at 08:37

## 2018-10-30 RX ADMIN — PHENYLEPHRINE HYDROCHLORIDE 100 MCG: 10 INJECTION INTRAVENOUS at 08:02

## 2018-10-30 RX ADMIN — SODIUM CHLORIDE: 9 INJECTION, SOLUTION INTRAVENOUS at 07:23

## 2018-10-30 RX ADMIN — ONDANSETRON HYDROCHLORIDE 4 MG: 2 SOLUTION INTRAMUSCULAR; INTRAVENOUS at 08:37

## 2018-10-30 RX ADMIN — HYDROMORPHONE HYDROCHLORIDE 0.5 MG: 2 INJECTION, SOLUTION INTRAMUSCULAR; INTRAVENOUS; SUBCUTANEOUS at 08:43

## 2018-10-30 RX ADMIN — DEXAMETHASONE SODIUM PHOSPHATE 4 MG: 4 INJECTION, SOLUTION INTRAMUSCULAR; INTRAVENOUS at 07:35

## 2018-10-30 RX ADMIN — LIDOCAINE HYDROCHLORIDE 100 MG: 20 INJECTION, SOLUTION INFILTRATION; PERINEURAL at 07:27

## 2018-10-30 RX ADMIN — MIDAZOLAM HYDROCHLORIDE 2 MG: 1 INJECTION, SOLUTION INTRAMUSCULAR; INTRAVENOUS at 07:19

## 2018-10-30 RX ADMIN — FENTANYL CITRATE 50 MCG: 50 INJECTION, SOLUTION INTRAMUSCULAR; INTRAVENOUS at 07:52

## 2018-10-30 ASSESSMENT — PULMONARY FUNCTION TESTS
PIF_VALUE: 25
PIF_VALUE: 14
PIF_VALUE: 17
PIF_VALUE: 11
PIF_VALUE: 11
PIF_VALUE: 14
PIF_VALUE: 27
PIF_VALUE: 2
PIF_VALUE: 27
PIF_VALUE: 17
PIF_VALUE: 14
PIF_VALUE: 14
PIF_VALUE: 23
PIF_VALUE: 2
PIF_VALUE: 23
PIF_VALUE: 23
PIF_VALUE: 27
PIF_VALUE: 1
PIF_VALUE: 2
PIF_VALUE: 16
PIF_VALUE: 27
PIF_VALUE: 24
PIF_VALUE: 14
PIF_VALUE: 24
PIF_VALUE: 2
PIF_VALUE: 15
PIF_VALUE: 27
PIF_VALUE: 1
PIF_VALUE: 17
PIF_VALUE: 17
PIF_VALUE: 27
PIF_VALUE: 14
PIF_VALUE: 17
PIF_VALUE: 27
PIF_VALUE: 14
PIF_VALUE: 23
PIF_VALUE: 15
PIF_VALUE: 2
PIF_VALUE: 15
PIF_VALUE: 10
PIF_VALUE: 26
PIF_VALUE: 29
PIF_VALUE: 27
PIF_VALUE: 3
PIF_VALUE: 27
PIF_VALUE: 25
PIF_VALUE: 15
PIF_VALUE: 24
PIF_VALUE: 14
PIF_VALUE: 2
PIF_VALUE: 17
PIF_VALUE: 24
PIF_VALUE: 28
PIF_VALUE: 23
PIF_VALUE: 27
PIF_VALUE: 27
PIF_VALUE: 28
PIF_VALUE: 11
PIF_VALUE: 2
PIF_VALUE: 10
PIF_VALUE: 27
PIF_VALUE: 26
PIF_VALUE: 14
PIF_VALUE: 27
PIF_VALUE: 10
PIF_VALUE: 14
PIF_VALUE: 27
PIF_VALUE: 14
PIF_VALUE: 27
PIF_VALUE: 11
PIF_VALUE: 26
PIF_VALUE: 14
PIF_VALUE: 1
PIF_VALUE: 27
PIF_VALUE: 27
PIF_VALUE: 11
PIF_VALUE: 14
PIF_VALUE: 26
PIF_VALUE: 11
PIF_VALUE: 24
PIF_VALUE: 26
PIF_VALUE: 24
PIF_VALUE: 26
PIF_VALUE: 25
PIF_VALUE: 26
PIF_VALUE: 27
PIF_VALUE: 27

## 2018-10-30 ASSESSMENT — PAIN - FUNCTIONAL ASSESSMENT: PAIN_FUNCTIONAL_ASSESSMENT: 0-10

## 2018-10-30 NOTE — PROGRESS NOTES
Pt to PACU in cart and in semi fowlers position. Resp adeq on on 8L o2 simple face mask, spo2 99%, VSS. Pt alert, denies needs or c/o. Surgical incisions to abdomen CDI, abdomen soft, ice pack placed for comfort. Fluid infusing through IV per anesthesia, no s/s distress noted. Will continue to monitor.

## 2018-10-30 NOTE — ANESTHESIA PRE PROCEDURE
Department of Anesthesiology  Preprocedure Note       Name:  Darío Conde   Age:  61 y.o.  :  1955                                          MRN:  [id number]         Date:  10/30/2018      Surgeon: Josephine Gonzales):  Karyn Prasad MD    Procedure: ROBOT ASSISTED LAPAROSCOPIC LEFT INGUINAL HERNIA REPAIR WITH MESH AND OPEN UMBILICAL HERNIA REPAIR WITH MESH (Left Abdomen)    Medications prior to admission:   Prior to Admission medications    Medication Sig Start Date End Date Taking? Authorizing Provider   oxyCODONE (ROXICODONE) 5 MG immediate release tablet Take 1 tablet by mouth every 4 hours as needed for Pain for up to 30 doses. . 10/30/18 11/6/18 Yes Karyn Prasad MD   triamterene-hydrochlorothiazide (DYAZIDE) 37.5-25 MG per capsule TAKE 1 CAPSULE BY MOUTH DAILY 18  Yes Sandra Culp MD       Current medications:    Current Facility-Administered Medications   Medication Dose Route Frequency Provider Last Rate Last Dose    ceFAZolin (ANCEF) 2 g in dextrose 5 % 100 mL IVPB  2 g Intravenous On Call to 1600 Serjio Krishnan MD        sodium chloride flush 0.9 % injection 10 mL  10 mL Intravenous 2 times per day Karyn Prasad MD        sodium chloride flush 0.9 % injection 10 mL  10 mL Intravenous PRN Karyn Prasad MD        ceFAZolin (ANCEF) 2-4 GM/100ML-% IVPB (premix) SOLN             0.9 % sodium chloride infusion   Intravenous Continuous John Garcia MD        sodium chloride flush 0.9 % injection 10 mL  10 mL Intravenous 2 times per day John Garcia MD        sodium chloride flush 0.9 % injection 10 mL  10 mL Intravenous PRN John Garcia MD        HYDROmorphone (DILAUDID) injection 0.25 mg  0.25 mg Intravenous Q5 Min PRN John Garcia MD        fentaNYL (SUBLIMAZE) injection 50 mcg  50 mcg Intravenous Q5 Min PRN John Garcia MD        HYDROmorphone (DILAUDID) injection 0.25 mg  0.25 mg Intravenous Q5 Min PRN John Garcia MD        HYDROmorphone (DILAUDID) injection 0.5 GI/Hepatic/Renal:             Endo/Other:                     Abdominal:           Vascular:                                        Anesthesia Plan      general     ASA 2       Induction: intravenous. Anesthetic plan and risks discussed with patient. Plan discussed with CRNA.                   Fern David MD   10/30/2018

## 2018-10-30 NOTE — ANESTHESIA POSTPROCEDURE EVALUATION
Department of Anesthesiology  Postprocedure Note    Patient: Eric Vale  MRN: [id number]  YOB: 1955  Date of evaluation: 10/30/2018  Time:  10:49 AM     Procedure Summary     Date:  10/30/18 Room / Location:  [facility] OR 08 / [facility] OR    Anesthesia Start:  0406 Anesthesia Stop:  Tami Sos    Procedure:  ROBOT ASSISTED LAPAROSCOPIC LEFT INGUINAL HERNIA REPAIR WITH MESH AND OPEN UMBILICAL HERNIA REPAIR (Left Abdomen) Diagnosis:       (K40.90 REDUCIBLE LEFT INGUINAL HERNIA)      (S36.4 UMBILICAL HERNIA)    Surgeon:  Guerda Sharp MD Responsible Provider:  Kavya Lanier MD    Anesthesia Type:  general ASA Status:  2          Anesthesia Type: general    Sherly Phase I: Sherly Score: 10    Sherly Phase II: Sherly Score: 10    Last vitals: Reviewed and per EMR flowsheets.        Anesthesia Post Evaluation    Patient location during evaluation: PACU  Patient participation: complete - patient participated  Level of consciousness: awake and alert  Airway patency: patent  Nausea & Vomiting: no nausea and no vomiting  Complications: no  Cardiovascular status: hemodynamically stable  Respiratory status: acceptable  Hydration status: stable

## 2018-10-31 NOTE — OP NOTE
was  extended medially to the cord structures. There was only a small  indirect hernia sac that was identified,  from the cord  structures which were carefully protected from harm and the cord lipoma  was reduced into the abdominal cavity as well and  from the  cord. A wide peritoneal flap was then created such that tugging on the  peritoneal flap did not result in any movement of the cord structures  indicating a wide enough flap had been created. A 10 X 15 ProGrip mesh  was trimmed and placed into the peritoneal cavity. It was then oriented  in the left inguinal space so that it adequately covered the direct,  indirect, and femoral hernia spaces with wide overlap and was unrolled  without any tension in good position. Due to the self-adhesive nature  of the ProGrip mesh, no additional fixation was necessary and hemostasis  was verified. The peritoneal flap was then closed with a barbed V-Loc  suture. The abdomen again was inspected. No injuries or abnormalities  were seen and the abdomen was then allowed to desufflate. All trocars  were then removed under direct vision. Local anesthetic was injected  into the wounds and all skin incisions were closed with 4-0 suture. The  wounds were then cleaned and dressed with Dermabond. The Rodriguez catheter  had clear yellow urine at the conclusion of the case and was then  removed as well. The patient tolerated the procedure well and was  transferred to PACU in stable condition. SPECIMENS:  None. DRAINS:  None. COMPLICATIONS:  None.         Ely Erickson MD    D: 10/30/2018 19:39:11       T: 10/31/2018 7:06:02     [email or web address]  Job#: [id number]     Doc#: [id number]    CC:

## 2018-11-07 ENCOUNTER — TELEPHONE (OUTPATIENT)
Dept: FAMILY MEDICINE CLINIC | Age: 63
End: 2018-11-07

## 2018-11-13 ENCOUNTER — OFFICE VISIT (OUTPATIENT)
Dept: SURGERY | Age: 63
End: 2018-11-13

## 2018-11-13 VITALS — DIASTOLIC BLOOD PRESSURE: 82 MMHG | BODY MASS INDEX: 25.66 KG/M2 | SYSTOLIC BLOOD PRESSURE: 124 MMHG | WEIGHT: 184 LBS

## 2018-11-13 DIAGNOSIS — Z09 SURGERY FOLLOW-UP: Primary | ICD-10-CM

## 2018-11-13 PROCEDURE — 99024 POSTOP FOLLOW-UP VISIT: CPT | Performed by: SURGERY

## 2018-11-13 NOTE — PATIENT INSTRUCTIONS
No heavy lifting over 20lbs for 6 weeks total postop  Diet and activity as tolerated otherwise  OK to return to work for light duty  Follow up with general surgery office as needed

## 2018-11-13 NOTE — PROGRESS NOTES
Triglycerides 08/24/2017 381* 10 - 149 mg/dL Final    HDL 08/24/2017 41* 60 - 92 mg/dL Final    Comment:       LIPID PROFILE INTERPRETATION                                      CHOLESTEROL,TOTAL(mg/dL)                                              DESIRABLE:                            < 200                                    BORDERLINE HIGH RISK:                 200 - 239                                HIGH RISK(UNDESIRABLE):               =/> 240                                      LDL CHOLESTEROL(mg/dL)                                               OPTIMAL:                              < 100                                    NEAR HIGH OPTIMAL:                    100 - 129                                BORDERLINE HIGH RISK:                 130 - 159                                HIGH RISK:                            160 - 189                                VERY HIGH RISK:                       =/> 190                                      HDL CHOLESTEROL(mg/dL)                                             HIGH RISK(UNDESIRABLE):               < 40                                     BOR                           DERLINE:                           40 - 59                                  LOW RISK (DESIRABLE):                 => 60                                        TRIGLYCERIDES (mg/dL)                                              NORMAL(DESIRABLE):                    < 150                                    BORDERLINE HIGH RISK:                 150 - 199                                HIGH RISK:                            200 - 499                                VERY HIGH RISK:                       =/> 500                                  Based on the guidlines of the [employer] Cholesterol                             Education Program (NCEP).                                                       Assumes sample obtained after a 9- to 12- hour fast.      LDL Calculated 08/24/2017 92  mg/dL Final Assessment:  robot-assisted laparoscopic left inguinal hernia repair with mesh and open umbilical hernia on October 30, 2018    Plan:  No heavy lifting over 20lbs for 6 weeks total postop  Diet and activity as tolerated otherwise  OK to return to work for light duty  Follow up with general surgery office as needed    Jarad Polanco MD, FACS  11/13/2018  2:48 PM

## 2018-11-14 ENCOUNTER — TELEPHONE (OUTPATIENT)
Dept: ENT CLINIC | Age: 63
End: 2018-11-14

## 2018-11-14 DIAGNOSIS — H81.09 MENIERE'S DISEASE, COCHLEOVESTIBULAR, ACTIVE, UNSPECIFIED LATERALITY: ICD-10-CM

## 2018-11-15 ENCOUNTER — OFFICE VISIT (OUTPATIENT)
Dept: FAMILY MEDICINE CLINIC | Age: 63
End: 2018-11-15
Payer: COMMERCIAL

## 2018-11-15 VITALS
OXYGEN SATURATION: 97 % | WEIGHT: 180 LBS | SYSTOLIC BLOOD PRESSURE: 120 MMHG | BODY MASS INDEX: 25.2 KG/M2 | HEIGHT: 71 IN | DIASTOLIC BLOOD PRESSURE: 80 MMHG | HEART RATE: 79 BPM

## 2018-11-15 DIAGNOSIS — Z12.5 SCREENING FOR PROSTATE CANCER: ICD-10-CM

## 2018-11-15 DIAGNOSIS — Z11.59 ENCOUNTER FOR HEPATITIS C SCREENING TEST FOR LOW RISK PATIENT: ICD-10-CM

## 2018-11-15 DIAGNOSIS — Z11.4 SCREENING FOR HIV (HUMAN IMMUNODEFICIENCY VIRUS): ICD-10-CM

## 2018-11-15 DIAGNOSIS — H81.09 ACTIVE COCHLEOVESTIBULAR MENIERE'S DISEASE, UNSPECIFIED LATERALITY: ICD-10-CM

## 2018-11-15 DIAGNOSIS — Z13.1 SCREENING FOR DIABETES MELLITUS: ICD-10-CM

## 2018-11-15 DIAGNOSIS — Z00.00 PREVENTATIVE HEALTH CARE: Primary | ICD-10-CM

## 2018-11-15 DIAGNOSIS — Z13.220 SCREENING, LIPID: ICD-10-CM

## 2018-11-15 PROCEDURE — 99396 PREV VISIT EST AGE 40-64: CPT | Performed by: PHYSICIAN ASSISTANT

## 2018-11-15 RX ORDER — TRIAMTERENE AND HYDROCHLOROTHIAZIDE 37.5; 25 MG/1; MG/1
1 CAPSULE ORAL DAILY
Qty: 90 CAPSULE | Refills: 3 | Status: SHIPPED | OUTPATIENT
Start: 2018-11-15 | End: 2019-05-30 | Stop reason: SDUPTHER

## 2018-11-15 ASSESSMENT — PATIENT HEALTH QUESTIONNAIRE - PHQ9
SUM OF ALL RESPONSES TO PHQ QUESTIONS 1-9: 0
SUM OF ALL RESPONSES TO PHQ QUESTIONS 1-9: 0
SUM OF ALL RESPONSES TO PHQ9 QUESTIONS 1 & 2: 0
1. LITTLE INTEREST OR PLEASURE IN DOING THINGS: 0
2. FEELING DOWN, DEPRESSED OR HOPELESS: 0

## 2018-11-15 ASSESSMENT — ENCOUNTER SYMPTOMS
BACK PAIN: 0
ABDOMINAL PAIN: 0
TROUBLE SWALLOWING: 0
COUGH: 0
SHORTNESS OF BREATH: 0
SORE THROAT: 0
DIARRHEA: 0
EYE PAIN: 0
CONSTIPATION: 0
VOICE CHANGE: 0
CHEST TIGHTNESS: 0

## 2018-11-15 NOTE — PATIENT INSTRUCTIONS
Jose Fiordalizaon was seen today for annual exam.    Diagnoses and all orders for this visit:    Preventative health care    Active cochleovestibular Meniere's disease, unspecified laterality    Screening, lipid  -     LIPID PANEL; Future    Screening for diabetes mellitus  -     Comprehensive Metabolic Panel    Screening for prostate cancer  -     Psa screening    Screening for HIV (human immunodeficiency virus)  -     HIV-1 AND HIV-2 ANTIBODIES; Future    Encounter for hepatitis C screening test for low risk patient  -     HEPATITIS C ANTIBODY; Future       Get routine labs, return yearly.

## 2018-11-30 ENCOUNTER — TELEPHONE (OUTPATIENT)
Dept: FAMILY MEDICINE CLINIC | Age: 63
End: 2018-11-30

## 2018-11-30 DIAGNOSIS — R97.20 ELEVATED PSA: Primary | ICD-10-CM

## 2018-11-30 DIAGNOSIS — E78.00 ELEVATED CHOLESTEROL: ICD-10-CM

## 2018-12-03 RX ORDER — ATORVASTATIN CALCIUM 20 MG/1
20 TABLET, FILM COATED ORAL DAILY
Qty: 30 TABLET | Refills: 5 | Status: SHIPPED | OUTPATIENT
Start: 2018-12-03 | End: 2019-05-25 | Stop reason: SDUPTHER

## 2019-02-14 ENCOUNTER — ANESTHESIA (OUTPATIENT)
Dept: OPERATING ROOM | Age: 64
End: 2019-02-14

## 2019-02-14 ENCOUNTER — HOSPITAL ENCOUNTER (OUTPATIENT)
Age: 64
Setting detail: OUTPATIENT SURGERY
Discharge: HOME | End: 2019-02-14
Attending: PLASTIC SURGERY | Admitting: PLASTIC SURGERY

## 2019-02-14 ENCOUNTER — ANESTHESIA EVENT (OUTPATIENT)
Dept: OPERATING ROOM | Age: 64
End: 2019-02-14

## 2019-02-14 VITALS — OXYGEN SATURATION: 93 % | SYSTOLIC BLOOD PRESSURE: 94 MMHG | TEMPERATURE: 97 F | DIASTOLIC BLOOD PRESSURE: 50 MMHG

## 2019-02-14 VITALS
DIASTOLIC BLOOD PRESSURE: 90 MMHG | OXYGEN SATURATION: 100 % | BODY MASS INDEX: 26.48 KG/M2 | SYSTOLIC BLOOD PRESSURE: 171 MMHG | WEIGHT: 185 LBS | RESPIRATION RATE: 16 BRPM | TEMPERATURE: 97.1 F | HEART RATE: 83 BPM | HEIGHT: 70 IN

## 2019-02-14 DIAGNOSIS — Z41.1 ENCOUNTER FOR COSMETIC SURGERY: Primary | ICD-10-CM

## 2019-02-14 LAB — POTASSIUM SERPL-SCNC: 3.9 MMOL/L (ref 3.5–5.1)

## 2019-02-14 PROCEDURE — 2500000003 HC RX 250 WO HCPCS: Performed by: PLASTIC SURGERY

## 2019-02-14 PROCEDURE — 2709999900 HC NON-CHARGEABLE SUPPLY: Performed by: PLASTIC SURGERY

## 2019-02-14 PROCEDURE — 7100000000 HC PACU RECOVERY - FIRST 15 MIN: Performed by: PLASTIC SURGERY

## 2019-02-14 PROCEDURE — 3700000000 HC ANESTHESIA ATTENDED CARE: Performed by: PLASTIC SURGERY

## 2019-02-14 PROCEDURE — 3600000012 HC SURGERY LEVEL 2 ADDTL 15MIN: Performed by: PLASTIC SURGERY

## 2019-02-14 PROCEDURE — 6370000000 HC RX 637 (ALT 250 FOR IP): Performed by: PLASTIC SURGERY

## 2019-02-14 PROCEDURE — 36415 COLL VENOUS BLD VENIPUNCTURE: CPT

## 2019-02-14 PROCEDURE — 84132 ASSAY OF SERUM POTASSIUM: CPT

## 2019-02-14 PROCEDURE — 6370000000 HC RX 637 (ALT 250 FOR IP): Performed by: FAMILY MEDICINE

## 2019-02-14 PROCEDURE — 2580000003 HC RX 258: Performed by: PLASTIC SURGERY

## 2019-02-14 PROCEDURE — 2500000003 HC RX 250 WO HCPCS: Performed by: NURSE ANESTHETIST, CERTIFIED REGISTERED

## 2019-02-14 PROCEDURE — 6360000002 HC RX W HCPCS: Performed by: PLASTIC SURGERY

## 2019-02-14 PROCEDURE — 3600000002 HC SURGERY LEVEL 2 BASE: Performed by: PLASTIC SURGERY

## 2019-02-14 PROCEDURE — 7100000011 HC PHASE II RECOVERY - ADDTL 15 MIN: Performed by: PLASTIC SURGERY

## 2019-02-14 PROCEDURE — 3700000001 HC ADD 15 MINUTES (ANESTHESIA): Performed by: PLASTIC SURGERY

## 2019-02-14 PROCEDURE — 7100000010 HC PHASE II RECOVERY - FIRST 15 MIN: Performed by: PLASTIC SURGERY

## 2019-02-14 PROCEDURE — 6360000002 HC RX W HCPCS: Performed by: NURSE ANESTHETIST, CERTIFIED REGISTERED

## 2019-02-14 PROCEDURE — 7100000001 HC PACU RECOVERY - ADDTL 15 MIN: Performed by: PLASTIC SURGERY

## 2019-02-14 PROCEDURE — 2580000003 HC RX 258: Performed by: NURSE ANESTHETIST, CERTIFIED REGISTERED

## 2019-02-14 RX ORDER — SODIUM CHLORIDE, SODIUM LACTATE, POTASSIUM CHLORIDE, CALCIUM CHLORIDE 600; 310; 30; 20 MG/100ML; MG/100ML; MG/100ML; MG/100ML
INJECTION, SOLUTION INTRAVENOUS CONTINUOUS
Status: DISCONTINUED | OUTPATIENT
Start: 2019-02-14 | End: 2019-02-14 | Stop reason: HOSPADM

## 2019-02-14 RX ORDER — HYDROCODONE BITARTRATE AND ACETAMINOPHEN 5; 325 MG/1; MG/1
1 TABLET ORAL EVERY 4 HOURS PRN
Qty: 25 TABLET | Refills: 0 | Status: SHIPPED | OUTPATIENT
Start: 2019-02-14 | End: 2019-02-21

## 2019-02-14 RX ORDER — PROPOFOL 10 MG/ML
INJECTION, EMULSION INTRAVENOUS PRN
Status: DISCONTINUED | OUTPATIENT
Start: 2019-02-14 | End: 2019-02-14 | Stop reason: SDUPTHER

## 2019-02-14 RX ORDER — LIDOCAINE HYDROCHLORIDE AND EPINEPHRINE 10; 10 MG/ML; UG/ML
INJECTION, SOLUTION INFILTRATION; PERINEURAL
Status: COMPLETED | OUTPATIENT
Start: 2019-02-14 | End: 2019-02-14

## 2019-02-14 RX ORDER — HYDROMORPHONE HCL 110MG/55ML
0.5 PATIENT CONTROLLED ANALGESIA SYRINGE INTRAVENOUS EVERY 5 MIN PRN
Status: DISCONTINUED | OUTPATIENT
Start: 2019-02-14 | End: 2019-02-14 | Stop reason: HOSPADM

## 2019-02-14 RX ORDER — LIDOCAINE HYDROCHLORIDE 20 MG/ML
INJECTION, SOLUTION EPIDURAL; INFILTRATION; INTRACAUDAL; PERINEURAL PRN
Status: DISCONTINUED | OUTPATIENT
Start: 2019-02-14 | End: 2019-02-14 | Stop reason: SDUPTHER

## 2019-02-14 RX ORDER — SODIUM CHLORIDE, SODIUM LACTATE, POTASSIUM CHLORIDE, CALCIUM CHLORIDE 600; 310; 30; 20 MG/100ML; MG/100ML; MG/100ML; MG/100ML
INJECTION, SOLUTION INTRAVENOUS CONTINUOUS PRN
Status: DISCONTINUED | OUTPATIENT
Start: 2019-02-14 | End: 2019-02-14 | Stop reason: SDUPTHER

## 2019-02-14 RX ORDER — PROMETHAZINE HYDROCHLORIDE 25 MG/ML
6.25 INJECTION, SOLUTION INTRAMUSCULAR; INTRAVENOUS PRN
Status: DISCONTINUED | OUTPATIENT
Start: 2019-02-14 | End: 2019-02-14 | Stop reason: HOSPADM

## 2019-02-14 RX ORDER — FENTANYL CITRATE 50 UG/ML
50 INJECTION, SOLUTION INTRAMUSCULAR; INTRAVENOUS EVERY 5 MIN PRN
Status: DISCONTINUED | OUTPATIENT
Start: 2019-02-14 | End: 2019-02-14 | Stop reason: HOSPADM

## 2019-02-14 RX ORDER — SODIUM CHLORIDE 9 MG/ML
INJECTION, SOLUTION INTRAVENOUS CONTINUOUS
Status: DISCONTINUED | OUTPATIENT
Start: 2019-02-14 | End: 2019-02-14 | Stop reason: HOSPADM

## 2019-02-14 RX ORDER — HYDROMORPHONE HCL 110MG/55ML
0.25 PATIENT CONTROLLED ANALGESIA SYRINGE INTRAVENOUS EVERY 5 MIN PRN
Status: DISCONTINUED | OUTPATIENT
Start: 2019-02-14 | End: 2019-02-14 | Stop reason: HOSPADM

## 2019-02-14 RX ORDER — MIDAZOLAM HYDROCHLORIDE 1 MG/ML
INJECTION INTRAMUSCULAR; INTRAVENOUS PRN
Status: DISCONTINUED | OUTPATIENT
Start: 2019-02-14 | End: 2019-02-14 | Stop reason: SDUPTHER

## 2019-02-14 RX ORDER — LABETALOL HYDROCHLORIDE 5 MG/ML
5 INJECTION, SOLUTION INTRAVENOUS EVERY 10 MIN PRN
Status: DISCONTINUED | OUTPATIENT
Start: 2019-02-14 | End: 2019-02-14 | Stop reason: HOSPADM

## 2019-02-14 RX ORDER — BACITRACIN 500 [USP'U]/G
OINTMENT OPHTHALMIC
Status: COMPLETED | OUTPATIENT
Start: 2019-02-14 | End: 2019-02-14

## 2019-02-14 RX ORDER — MEPERIDINE HYDROCHLORIDE 25 MG/ML
12.5 INJECTION INTRAMUSCULAR; INTRAVENOUS; SUBCUTANEOUS EVERY 5 MIN PRN
Status: DISCONTINUED | OUTPATIENT
Start: 2019-02-14 | End: 2019-02-14 | Stop reason: HOSPADM

## 2019-02-14 RX ORDER — DIPHENHYDRAMINE HYDROCHLORIDE 50 MG/ML
12.5 INJECTION INTRAMUSCULAR; INTRAVENOUS
Status: DISCONTINUED | OUTPATIENT
Start: 2019-02-14 | End: 2019-02-14 | Stop reason: HOSPADM

## 2019-02-14 RX ORDER — CEFAZOLIN SODIUM 2 G/100ML
2 INJECTION, SOLUTION INTRAVENOUS
Status: COMPLETED | OUTPATIENT
Start: 2019-02-14 | End: 2019-02-14

## 2019-02-14 RX ORDER — SODIUM CHLORIDE 0.9 % (FLUSH) 0.9 %
10 SYRINGE (ML) INJECTION PRN
Status: DISCONTINUED | OUTPATIENT
Start: 2019-02-14 | End: 2019-02-14 | Stop reason: HOSPADM

## 2019-02-14 RX ORDER — SODIUM CHLORIDE 0.9 % (FLUSH) 0.9 %
10 SYRINGE (ML) INJECTION EVERY 12 HOURS SCHEDULED
Status: DISCONTINUED | OUTPATIENT
Start: 2019-02-14 | End: 2019-02-14 | Stop reason: HOSPADM

## 2019-02-14 RX ORDER — OXYCODONE HYDROCHLORIDE 5 MG/1
10 TABLET ORAL PRN
Status: COMPLETED | OUTPATIENT
Start: 2019-02-14 | End: 2019-02-14

## 2019-02-14 RX ORDER — LIDOCAINE HYDROCHLORIDE 10 MG/ML
0.5 INJECTION, SOLUTION EPIDURAL; INFILTRATION; INTRACAUDAL; PERINEURAL ONCE
Status: DISCONTINUED | OUTPATIENT
Start: 2019-02-14 | End: 2019-02-14 | Stop reason: HOSPADM

## 2019-02-14 RX ORDER — OXYCODONE HYDROCHLORIDE 5 MG/1
5 TABLET ORAL PRN
Status: COMPLETED | OUTPATIENT
Start: 2019-02-14 | End: 2019-02-14

## 2019-02-14 RX ADMIN — PROPOFOL 30 MG: 10 INJECTION, EMULSION INTRAVENOUS at 09:09

## 2019-02-14 RX ADMIN — PROPOFOL 30 MG: 10 INJECTION, EMULSION INTRAVENOUS at 09:06

## 2019-02-14 RX ADMIN — PROPOFOL 30 MG: 10 INJECTION, EMULSION INTRAVENOUS at 09:23

## 2019-02-14 RX ADMIN — PROPOFOL 30 MG: 10 INJECTION, EMULSION INTRAVENOUS at 09:41

## 2019-02-14 RX ADMIN — SODIUM CHLORIDE, POTASSIUM CHLORIDE, SODIUM LACTATE AND CALCIUM CHLORIDE: 600; 310; 30; 20 INJECTION, SOLUTION INTRAVENOUS at 07:45

## 2019-02-14 RX ADMIN — PROPOFOL 30 MG: 10 INJECTION, EMULSION INTRAVENOUS at 09:27

## 2019-02-14 RX ADMIN — CEFAZOLIN SODIUM 2 G: 2 INJECTION, SOLUTION INTRAVENOUS at 08:54

## 2019-02-14 RX ADMIN — PROPOFOL 30 MG: 10 INJECTION, EMULSION INTRAVENOUS at 09:46

## 2019-02-14 RX ADMIN — MIDAZOLAM HYDROCHLORIDE 2 MG: 1 INJECTION, SOLUTION INTRAMUSCULAR; INTRAVENOUS at 08:56

## 2019-02-14 RX ADMIN — LIDOCAINE HYDROCHLORIDE 80 MG: 20 INJECTION, SOLUTION EPIDURAL; INFILTRATION; INTRACAUDAL; PERINEURAL at 09:02

## 2019-02-14 RX ADMIN — SODIUM CHLORIDE, POTASSIUM CHLORIDE, SODIUM LACTATE AND CALCIUM CHLORIDE: 600; 310; 30; 20 INJECTION, SOLUTION INTRAVENOUS at 08:45

## 2019-02-14 RX ADMIN — PROPOFOL 40 MG: 10 INJECTION, EMULSION INTRAVENOUS at 09:04

## 2019-02-14 RX ADMIN — OXYCODONE HYDROCHLORIDE 5 MG: 5 TABLET ORAL at 10:14

## 2019-02-14 RX ADMIN — PROPOFOL 30 MG: 10 INJECTION, EMULSION INTRAVENOUS at 09:36

## 2019-02-14 RX ADMIN — PROPOFOL 30 MG: 10 INJECTION, EMULSION INTRAVENOUS at 09:19

## 2019-02-14 RX ADMIN — PROPOFOL 30 MG: 10 INJECTION, EMULSION INTRAVENOUS at 09:31

## 2019-02-14 RX ADMIN — PROPOFOL 30 MG: 10 INJECTION, EMULSION INTRAVENOUS at 09:14

## 2019-02-14 RX ADMIN — PROPOFOL 100 MG: 10 INJECTION, EMULSION INTRAVENOUS at 09:02

## 2019-02-14 ASSESSMENT — PULMONARY FUNCTION TESTS
PIF_VALUE: 0

## 2019-02-14 ASSESSMENT — PAIN SCALES - GENERAL
PAINLEVEL_OUTOF10: 5
PAINLEVEL_OUTOF10: 5

## 2019-05-01 ENCOUNTER — OFFICE VISIT (OUTPATIENT)
Dept: ENT CLINIC | Age: 64
End: 2019-05-01
Payer: COMMERCIAL

## 2019-05-01 VITALS
DIASTOLIC BLOOD PRESSURE: 73 MMHG | SYSTOLIC BLOOD PRESSURE: 129 MMHG | HEART RATE: 81 BPM | BODY MASS INDEX: 26.04 KG/M2 | WEIGHT: 186 LBS | HEIGHT: 71 IN | TEMPERATURE: 81 F

## 2019-05-01 DIAGNOSIS — H93.19 TINNITUS, UNSPECIFIED LATERALITY: ICD-10-CM

## 2019-05-01 DIAGNOSIS — H81.09 ACTIVE COCHLEOVESTIBULAR MENIERE'S DISEASE, UNSPECIFIED LATERALITY: Primary | ICD-10-CM

## 2019-05-01 PROCEDURE — 99213 OFFICE O/P EST LOW 20 MIN: CPT | Performed by: OTOLARYNGOLOGY

## 2019-05-01 NOTE — PROGRESS NOTES
Value Ref Range & Units Status Collected Lab   Potassium 3.9  3.5 - 5.1 mmol/L Final 02/14/2019  7:30 AM [facility]- [facility] [address] / Abi Van / Maggie Daniel / PROCEDURES:       Etienne Garcia was seen today for ear problem. Diagnoses and all orders for this visit:    Active cochleovestibular Meniere's disease, unspecified laterality    Tinnitus, unspecified laterality        RECOMMENDATIONS / PLAN:    1. See patient instructions on file for this visit, which were fully discussed with the patient. 2. Return in about 1 year (around 5/1/2020) for recheck/follow-up, and sooner if condition worsens. >> Please note portions of this note were completed with a voice recognition program.  Efforts were made to edit the dictations but occasionally words are mis-transcribed.

## 2019-05-01 NOTE — PATIENT INSTRUCTIONS
1. Avoid working at heights, on ladders or scaffolding or near the edges of platforms or using heavy or complicated machinery or operating a motorized vehicle, or any activity where loss of consciousness would be hazardous to yourself or others, if you are experiencing dizziness, and until having been dizzy free for 72 hours. Even then, take appropriate care and precautions as dizziness could occur at any time. 2. Avoid any motions or activity that results in the off balance sensation. Stand up or arise slowly and in stages, as we discussed.

## 2019-05-20 ENCOUNTER — OFFICE VISIT (OUTPATIENT)
Dept: FAMILY MEDICINE CLINIC | Age: 64
End: 2019-05-20
Payer: COMMERCIAL

## 2019-05-20 VITALS
SYSTOLIC BLOOD PRESSURE: 120 MMHG | BODY MASS INDEX: 26.11 KG/M2 | OXYGEN SATURATION: 97 % | TEMPERATURE: 98.3 F | DIASTOLIC BLOOD PRESSURE: 84 MMHG | HEART RATE: 82 BPM | WEIGHT: 184.6 LBS

## 2019-05-20 DIAGNOSIS — M25.512 ACUTE PAIN OF LEFT SHOULDER: ICD-10-CM

## 2019-05-20 DIAGNOSIS — N45.1 EPIDIDYMITIS: Primary | ICD-10-CM

## 2019-05-20 PROCEDURE — 99214 OFFICE O/P EST MOD 30 MIN: CPT | Performed by: FAMILY MEDICINE

## 2019-05-20 RX ORDER — NAPROXEN 500 MG/1
500 TABLET ORAL 2 TIMES DAILY WITH MEALS
Qty: 30 TABLET | Refills: 0 | Status: SHIPPED | OUTPATIENT
Start: 2019-05-20 | End: 2019-11-15

## 2019-05-20 RX ORDER — CIPROFLOXACIN 500 MG/1
500 TABLET, FILM COATED ORAL 2 TIMES DAILY
Qty: 14 TABLET | Refills: 0 | Status: SHIPPED | OUTPATIENT
Start: 2019-05-20 | End: 2019-05-27

## 2019-05-20 ASSESSMENT — PATIENT HEALTH QUESTIONNAIRE - PHQ9
SUM OF ALL RESPONSES TO PHQ QUESTIONS 1-9: 0
2. FEELING DOWN, DEPRESSED OR HOPELESS: 0
SUM OF ALL RESPONSES TO PHQ9 QUESTIONS 1 & 2: 0
SUM OF ALL RESPONSES TO PHQ QUESTIONS 1-9: 0
1. LITTLE INTEREST OR PLEASURE IN DOING THINGS: 0

## 2019-05-20 NOTE — PROGRESS NOTES
Subjective:      Patient ID: Lori Barnes is a 59 y.o. male. CC: Patient presents for acute medical problem-right lower quadrant abdominal pain and testicular pain and also wants to discuss left shoulder pain . Medical assistant notes reviewed. HPI Patient presents with right testicle pain since yesterday afternoon. He states he had trouble standing vertical. He states it didn't hurt as much when he was laying down. Patient denies any urinary issues. Patient states he has tenderness in his right testicle which he's not had the past.  He denies any fevers or chills. Patient also states he fell on his left shoulder 4 months ago. He's been having discomfort in the shoulder for this time. He states he is restricted in some of his activities because of the shoulder pain. He is concerned about possible rotator cuff tear. Patient had prior surgery of the right side    Review of Systems     Allergies   Allergen Reactions    Seasonal      sneezing       Objective:   Physical Exam   Constitutional: He appears well-developed and well-nourished. He is cooperative. Abdominal: Soft. Normal appearance and bowel sounds are normal. He exhibits no distension and no mass. There is no hepatosplenomegaly. There is no tenderness. There is no rigidity, no rebound, no guarding and no CVA tenderness. A hernia is present. Hernia confirmed negative in the right inguinal area and confirmed negative in the left inguinal area. Genitourinary: Penis normal. Right testis shows tenderness (posterior aspect of the right testicle). Right testis shows no mass and no swelling. Left testis shows no mass, no swelling and no tenderness. Musculoskeletal:        Left shoulder: He exhibits tenderness (Nixon-bursal area). He exhibits normal range of motion, no crepitus, no deformity and normal strength. Lymphadenopathy: No inguinal adenopathy noted on the right or left side. Neurological: He is alert.        Assessment:      Leonce Moritz was seen today for testicle pain. Diagnoses and all orders for this visit:    Epididymitis    Acute pain of left shoulder    Other orders  -     naproxen (NAPROSYN) 500 MG tablet; Take 1 tablet by mouth 2 times daily (with meals)  -     ciprofloxacin (CIPRO) 500 MG tablet;  Take 1 tablet by mouth 2 times daily for 7 days            Plan:      Information provided regards epididymitis  Use anti-inflammatory medications for shoulder strain and discussed avoiding overhead activities  RTC when necessary        Micehlle Gay MA

## 2019-05-25 DIAGNOSIS — E78.00 ELEVATED CHOLESTEROL: ICD-10-CM

## 2019-05-29 RX ORDER — ATORVASTATIN CALCIUM 20 MG/1
TABLET, FILM COATED ORAL
Qty: 30 TABLET | Refills: 5 | Status: SHIPPED | OUTPATIENT
Start: 2019-05-29 | End: 2019-11-15

## 2019-05-30 DIAGNOSIS — H81.09 MENIERE'S DISEASE, COCHLEOVESTIBULAR, ACTIVE, UNSPECIFIED LATERALITY: ICD-10-CM

## 2019-05-30 NOTE — TELEPHONE ENCOUNTER
He did not pass the diuretic protocol. He needs a serum creatinine and sodium level. Also, a one year supply was prescribed on 11/15/2018. Why are they requesting a refill early?

## 2019-05-31 RX ORDER — TRIAMTERENE AND HYDROCHLOROTHIAZIDE 37.5; 25 MG/1; MG/1
1 CAPSULE ORAL DAILY
Qty: 30 CAPSULE | Refills: 10 | Status: SHIPPED | OUTPATIENT
Start: 2019-05-31 | End: 2020-02-24

## 2019-05-31 NOTE — TELEPHONE ENCOUNTER
I phoned the patient's pharmacy. I spoke to Luna. He stated that the last RX that they have on file was dated 05/30/2018 it was a 30 day supply with 10 refills. I told him that last prescription we are showing was sent on 11/18/2018 for a quanity of 90 day with 3 refills. Luna stated they never rec'd that prescription. He said it is not unheard of for a Electronic prescription to show they rec'd it, when they have not. He said we can call in and give a verbal or resend a prescription.

## 2019-06-12 ENCOUNTER — OFFICE VISIT (OUTPATIENT)
Dept: FAMILY MEDICINE CLINIC | Age: 64
End: 2019-06-12
Payer: COMMERCIAL

## 2019-06-12 VITALS
WEIGHT: 188.38 LBS | RESPIRATION RATE: 12 BRPM | SYSTOLIC BLOOD PRESSURE: 114 MMHG | DIASTOLIC BLOOD PRESSURE: 72 MMHG | BODY MASS INDEX: 26.65 KG/M2 | OXYGEN SATURATION: 95 % | HEART RATE: 88 BPM

## 2019-06-12 DIAGNOSIS — S03.00XA DISLOCATION OF TEMPOROMANDIBULAR JOINT, INITIAL ENCOUNTER: Primary | ICD-10-CM

## 2019-06-12 PROCEDURE — 99213 OFFICE O/P EST LOW 20 MIN: CPT | Performed by: FAMILY MEDICINE

## 2019-06-12 RX ORDER — PREDNISONE 20 MG/1
TABLET ORAL
Qty: 20 TABLET | Refills: 0 | Status: SHIPPED | OUTPATIENT
Start: 2019-06-12 | End: 2019-11-15

## 2019-06-12 NOTE — PROGRESS NOTES
Subjective:      Patient ID: Shari Holder is a 59 y.o. male. CC: Patient presents for acute medical problem-jaw pain. Medical assistant notes reviewed. HPI  Pt is here due to having left jaw pain for 2 weeks now. Feels like is dislocating and has been taking ibuprofen for a while, achy, but not taking ibuprofen as it doesn't work. Patient had no injury. He was told by the dentist that he is a teeth . He having pain in the left TM joint area. He has no pain and discomfort in his teeth. Review of Systems  Allergies   Allergen Reactions    Seasonal      sneezing     Objective:   Physical Exam   Constitutional: He appears well-developed and well-nourished. He is cooperative. HENT:   Right Ear: Tympanic membrane and ear canal normal.   Left Ear: Tympanic membrane and ear canal normal.   Nose: Nose normal.   Mouth/Throat: Uvula is midline and oropharynx is clear and moist.   Tenderness in the TMJ area on the left side only. No bony tenderness over the face or jawline area. Neck: Neck supple. Pulmonary/Chest: He exhibits no deformity. Lymphadenopathy:     He has no cervical adenopathy. Neurological: He is alert. Assessment:      Denisse Arnold was seen today for jaw pain.     Diagnoses and all orders for this visit:    Dislocation of temporomandibular joint, initial encounter    Other orders  -     predniSONE (DELTASONE) 20 MG tablet; 1 TID for 4 day then 1 BID            Plan:      Informational handout provided  Encourage patient to keep appoint with dentist discussed bite-block  RTC PRN          Yolie Acuna

## 2019-06-12 NOTE — PATIENT INSTRUCTIONS
Patient Education        Temporomandibular Disorder: Care Instructions  Your Care Instructions    Temporomandibular (TM) disorders are a problem with the muscles and joints that connect your jaw to your skull. They cause pain when you open your mouth, chew, or yawn. You may feel this pain on one or both sides. TM disorders are often caused by tight jaw muscles. The tightness can be caused by clenching or grinding your teeth. This may happen when you have a lot of stress in your life. If you lower your stress, you may be able to stop clenching or grinding your teeth. This will help relax your jaw and reduce your pain. You may also be able to do some things at home to feel better. But if none of this works, your doctor may prescribe medicine to help relax your muscles and control the pain. Follow-up care is a key part of your treatment and safety. Be sure to make and go to all appointments, and call your doctor if you are having problems. It's also a good idea to know your test results and keep a list of the medicines you take. How can you care for yourself at home? · Put a warm, moist cloth or heating pad set on low on your jaw. Do this for 10 to 20 minutes at a time. Put a thin cloth between the heating pad and your skin. · Avoid hard or chewy foods that cause your jaws to work very hard. Examples include popcorn, jerky, tough meats, chewy breads, gum, and raw apples and carrots. · Choose softer foods that are easy to chew. These include eggs, yogurt, and soup. · Cut your food into small pieces. Chew slowly. · If your jaw gets too painful to chew, or if it locks, you may need to puree your food for a few days or weeks. · To relax your jaw, repeat this exercise for a few minutes every morning and evening. Watch yourself in a mirror. Gently open and close your mouth. Move your jaw straight up and down. But don't do this if it makes your pain worse.   · Get at least 30 minutes of exercise on most days of the week to relieve stress. Walking is a good choice. You also may want to do other activities, such as running, swimming, cycling, or playing tennis or team sports. · Do not:  ? Hold a phone between your shoulder and your jaw. ? Open your mouth all the way, like when you sing loudly or yawn. ? Clench or grind your teeth, bite your lips, or chew your fingernails. ? Clench things such as pens, pipes, or cigars between your teeth. When should you call for help? Call your doctor now or seek immediate medical care if:    · Your jaw is locked open or shut or it is hard to move your jaw.    Watch closely for changes in your health, and be sure to contact your doctor if:    · Your jaw pain gets worse.     · Your face is swollen.     · You do not get better as expected. Where can you learn more? Go to https://EPAM SystemspePakSenseeb.Paltalk. org and sign in to your [employer] account. Enter T200 in the XO1 box to learn more about \"Temporomandibular Disorder: Care Instructions. \"     If you do not have an account, please click on the \"Sign Up Now\" link. Current as of: October 3, 2018  Content Version: 12.0  © 5489-7935 Healthwise, Incorporated. Care instructions adapted under license by [facility] ([facility]). If you have questions about a medical condition or this instruction, always ask your healthcare professional. [place] [address] any warranty or liability for your use of this information.

## 2019-11-15 ENCOUNTER — OFFICE VISIT (OUTPATIENT)
Dept: FAMILY MEDICINE CLINIC | Age: 64
End: 2019-11-15
Payer: COMMERCIAL

## 2019-11-15 VITALS
BODY MASS INDEX: 26.69 KG/M2 | HEIGHT: 70 IN | DIASTOLIC BLOOD PRESSURE: 82 MMHG | HEART RATE: 83 BPM | WEIGHT: 186.4 LBS | SYSTOLIC BLOOD PRESSURE: 130 MMHG | OXYGEN SATURATION: 97 %

## 2019-11-15 DIAGNOSIS — M25.512 ACUTE PAIN OF LEFT SHOULDER: ICD-10-CM

## 2019-11-15 DIAGNOSIS — H81.09 ACTIVE COCHLEOVESTIBULAR MENIERE'S DISEASE, UNSPECIFIED LATERALITY: ICD-10-CM

## 2019-11-15 DIAGNOSIS — Z00.00 WELL ADULT EXAM: Primary | ICD-10-CM

## 2019-11-15 PROCEDURE — 99396 PREV VISIT EST AGE 40-64: CPT | Performed by: FAMILY MEDICINE

## 2019-11-15 ASSESSMENT — PATIENT HEALTH QUESTIONNAIRE - PHQ9
SUM OF ALL RESPONSES TO PHQ9 QUESTIONS 1 & 2: 0
SUM OF ALL RESPONSES TO PHQ QUESTIONS 1-9: 0
2. FEELING DOWN, DEPRESSED OR HOPELESS: 0
1. LITTLE INTEREST OR PLEASURE IN DOING THINGS: 0
SUM OF ALL RESPONSES TO PHQ QUESTIONS 1-9: 0

## 2019-11-22 DIAGNOSIS — E78.00 ELEVATED CHOLESTEROL: ICD-10-CM

## 2019-11-22 RX ORDER — ATORVASTATIN CALCIUM 20 MG/1
TABLET, FILM COATED ORAL
Qty: 90 TABLET | Refills: 3 | Status: SHIPPED | OUTPATIENT
Start: 2019-11-22 | End: 2020-11-06

## 2019-12-21 ENCOUNTER — TELEPHONE (OUTPATIENT)
Dept: FAMILY MEDICINE CLINIC | Age: 64
End: 2019-12-21

## 2019-12-23 ENCOUNTER — OFFICE VISIT (OUTPATIENT)
Dept: FAMILY MEDICINE CLINIC | Age: 64
End: 2019-12-23
Payer: COMMERCIAL

## 2019-12-23 VITALS — WEIGHT: 185 LBS | DIASTOLIC BLOOD PRESSURE: 80 MMHG | SYSTOLIC BLOOD PRESSURE: 124 MMHG | BODY MASS INDEX: 26.93 KG/M2

## 2019-12-23 DIAGNOSIS — M79.671 RIGHT FOOT PAIN: Primary | ICD-10-CM

## 2019-12-23 PROCEDURE — 99213 OFFICE O/P EST LOW 20 MIN: CPT | Performed by: FAMILY MEDICINE

## 2019-12-23 ASSESSMENT — ENCOUNTER SYMPTOMS
SHORTNESS OF BREATH: 0
WHEEZING: 0
APNEA: 0

## 2020-02-24 NOTE — TELEPHONE ENCOUNTER
Refill request  Last office visit:05/01/2019  Upcoming :05/01/2020   Return in about 1 year (around 5/1/2020) for recheck/follow-up, and sooner if condition worsens.

## 2020-02-25 RX ORDER — TRIAMTERENE AND HYDROCHLOROTHIAZIDE 37.5; 25 MG/1; MG/1
1 CAPSULE ORAL DAILY
Qty: 90 CAPSULE | Refills: 0 | Status: SHIPPED | OUTPATIENT
Start: 2020-02-25 | End: 2020-03-05 | Stop reason: SDUPTHER

## 2020-03-05 RX ORDER — TRIAMTERENE AND HYDROCHLOROTHIAZIDE 37.5; 25 MG/1; MG/1
CAPSULE ORAL
Qty: 90 CAPSULE | Refills: 0 | OUTPATIENT
Start: 2020-03-05

## 2020-03-05 RX ORDER — TRIAMTERENE AND HYDROCHLOROTHIAZIDE 37.5; 25 MG/1; MG/1
1 CAPSULE ORAL DAILY
Qty: 90 CAPSULE | Refills: 0 | Status: SHIPPED | OUTPATIENT
Start: 2020-03-05 | End: 2020-06-12 | Stop reason: SDUPTHER

## 2020-03-05 NOTE — TELEPHONE ENCOUNTER
[phone] (home) [phone] (work)  Patient stated he lost his Triamterene-Hydrochlorothiazide medicine and he thinks he may of accidentally threw it away. Does patient need to be seen again. He is scheduled for a follow up on 05/01/2020.

## 2020-06-12 ENCOUNTER — OFFICE VISIT (OUTPATIENT)
Dept: ENT CLINIC | Age: 65
End: 2020-06-12
Payer: COMMERCIAL

## 2020-06-12 VITALS
DIASTOLIC BLOOD PRESSURE: 69 MMHG | HEART RATE: 80 BPM | TEMPERATURE: 97.7 F | BODY MASS INDEX: 25.9 KG/M2 | HEIGHT: 71 IN | WEIGHT: 185 LBS | SYSTOLIC BLOOD PRESSURE: 129 MMHG

## 2020-06-12 PROCEDURE — 99213 OFFICE O/P EST LOW 20 MIN: CPT | Performed by: OTOLARYNGOLOGY

## 2020-06-12 RX ORDER — TRIAMTERENE AND HYDROCHLOROTHIAZIDE 37.5; 25 MG/1; MG/1
1 CAPSULE ORAL DAILY
Qty: 90 CAPSULE | Refills: 3 | Status: SHIPPED | OUTPATIENT
Start: 2020-06-12 | End: 2021-06-08 | Stop reason: SDUPTHER

## 2020-09-02 ENCOUNTER — OFFICE VISIT (OUTPATIENT)
Dept: FAMILY MEDICINE CLINIC | Age: 65
End: 2020-09-02
Payer: COMMERCIAL

## 2020-09-02 VITALS
DIASTOLIC BLOOD PRESSURE: 80 MMHG | WEIGHT: 185 LBS | SYSTOLIC BLOOD PRESSURE: 126 MMHG | BODY MASS INDEX: 25.8 KG/M2 | TEMPERATURE: 97.7 F | HEART RATE: 79 BPM | OXYGEN SATURATION: 98 %

## 2020-09-02 PROCEDURE — 99214 OFFICE O/P EST MOD 30 MIN: CPT | Performed by: FAMILY MEDICINE

## 2020-09-02 PROCEDURE — 17000 DESTRUCT PREMALG LESION: CPT | Performed by: FAMILY MEDICINE

## 2020-09-02 RX ORDER — PREDNISONE 20 MG/1
TABLET ORAL
Qty: 20 TABLET | Refills: 0 | Status: SHIPPED | OUTPATIENT
Start: 2020-09-02 | End: 2020-10-12

## 2020-09-02 RX ORDER — SILDENAFIL 100 MG/1
100 TABLET, FILM COATED ORAL DAILY PRN
Qty: 10 TABLET | Refills: 5 | Status: SHIPPED | OUTPATIENT
Start: 2020-09-02 | End: 2022-05-02 | Stop reason: SDUPTHER

## 2020-09-02 ASSESSMENT — PATIENT HEALTH QUESTIONNAIRE - PHQ9
1. LITTLE INTEREST OR PLEASURE IN DOING THINGS: 0
SUM OF ALL RESPONSES TO PHQ QUESTIONS 1-9: 0
SUM OF ALL RESPONSES TO PHQ9 QUESTIONS 1 & 2: 0
2. FEELING DOWN, DEPRESSED OR HOPELESS: 0
SUM OF ALL RESPONSES TO PHQ QUESTIONS 1-9: 0

## 2020-09-02 NOTE — PROGRESS NOTES
Subjective:      Patient ID: Girma Randall is a 72 y.o. male. CC: Patient presents for acute medical problem-neck pain with numbness into the occipital area with right skull. But he also wants to discuss a skin lesion on the top of his head and refill of medication for ED. Medical assistant notes reviewed. HPI Patient presents with neck pain for the past 3 weeks. Patient states it started popping and cracking and then the pain came. He states he moves a certain way and it would wake him up at night. He states the pain seems to be a little better today. He has been taking Ibuprofen 800mg every 4 hours. Patient states he is always had some cracking and popping of his neck but he now has sharp pain that shoots up to the occipital area of his right skull. He denies any symptoms into his arm. He denies any weakness in his upper extremity. He has tried also some heat and ice to the affected area. Patient has a scalp lesion that he wants to be evaluated for and he wants to discuss the ED. In the past he is used Viagra and he wants a refill of that medication. He is due for a physical examination in the near future. Review of Systems     Allergies   Allergen Reactions    Seasonal      sneezing       Objective:   Physical Exam  Constitutional:       General: He is not in acute distress. Appearance: He is well-developed. Musculoskeletal:      Right shoulder: He exhibits no tenderness, no swelling and no deformity. Left shoulder: He exhibits no tenderness, no swelling and no laceration. Cervical back: He exhibits spasm (Right side). He exhibits no tenderness, no swelling and no deformity. Skin:     General: Skin is warm and dry. Findings: Lesion (Crown of skull with a 3 mm raise actinic keratosis) present. No rash. Neurological:      Mental Status: He is alert and oriented to person, place, and time. Sensory: Sensation is intact. Motor: Motor function is intact. Coordination: Coordination is intact. Deep Tendon Reflexes:      Reflex Scores:       Tricep reflexes are 2+ on the right side and 2+ on the left side. Bicep reflexes are 2+ on the right side and 2+ on the left side. Psychiatric:         Behavior: Behavior is cooperative. Back Exam     Tenderness   The patient is experiencing tenderness in the cervical.    Range of Motion   Extension: abnormal   Flexion: normal   Rotation right: normal   Rotation left: normal             Assessment:      Tye Dominguez was seen today for neck pain. Diagnoses and all orders for this visit:    Cervical radiculopathy    Actinic keratosis  -     LA DESTRUC PREMALIGNANT, FIRST LESION    Erectile dysfunction, unspecified erectile dysfunction type    Well adult exam  -     Comprehensive Metabolic Panel - Vitros; Future  -     Lipid Panel; Future  -     Psa screening; Future    Other orders  -     predniSONE (DELTASONE) 20 MG tablet; 1 TID for 4 day then 1 BID  -     sildenafil (VIAGRA) 100 MG tablet; Take 1 tablet by mouth daily as needed for Erectile Dysfunction            Plan:      Information are provided in regards to cervical radiculopathy and discussed neck range of motion exercises that would be beneficial for him. Begin a trial of prednisone and we did discuss that this was done improving physical therapy with the neck step. Patient would like to proceed with cryotherapy to the scalp lesion. Refill prescription for Viagra and RTC appointment for CPE in the future      Please note that this chart was generated using Dragon dictation software. Although every effort was made to ensure the accuracy of this automated transcription, some errors in transcription may have occurred.

## 2020-09-02 NOTE — PATIENT INSTRUCTIONS
Patient Education        Pinched Nerve in the Neck: Care Instructions  Your Care Instructions  A pinched nerve in the neck happens when a vertebra or disc in the upper part of your spine is damaged. This damage can happen because of an injury. Or it can just happen with age. The changes caused by the damage may put pressure on a nearby nerve root, pinching it. This causes symptoms such as sharp pain in your neck, shoulder, arm, hand, or back. You may also have tingling or numbness. Sometimes it makes your arm weaker. The symptoms are usually worse when you turn your head or strain your neck. For many people, the symptoms get better over time and finally go away. Early treatment usually includes medicines for pain and swelling. Sometimes physical therapy and special exercises may help. Follow-up care is a key part of your treatment and safety. Be sure to make and go to all appointments, and call your doctor if you are having problems. It's also a good idea to know your test results and keep a list of the medicines you take. How can you care for yourself at home? · Be safe with medicines. Read and follow all instructions on the label. ? If the doctor gave you a prescription medicine for pain, take it as prescribed. ? If you are not taking a prescription pain medicine, ask your doctor if you can take an over-the-counter medicine. · Try using a heating pad on a low or medium setting for 15 to 20 minutes every 2 or 3 hours. Try a warm shower in place of one session with the heating pad. You can also buy single-use heat wraps that last up to 8 hours. · You can also try an ice pack for 10 to 15 minutes every 2 to 3 hours. There isn't strong evidence that either heat or ice will help. But you can try them to see if they help you. · Don't spend too long in one position. Take short breaks to move around and change positions. · Wear a seat belt and shoulder harness when you are in a car.   · Sleep with a pillow under your head and neck that keeps your neck straight. · If you were given a neck brace (cervical collar) to limit neck motion, wear it as instructed for as many days as your doctor tells you to. Do not wear it longer than you were told to. Wearing a brace for too long can lead to neck stiffness and can weaken the neck muscles. · Follow your doctor's instructions for gentle neck-stretching exercises. · Do not smoke. Smoking can slow healing of your discs. If you need help quitting, talk to your doctor about stop-smoking programs and medicines. These can increase your chances of quitting for good. · Avoid strenuous work or exercise until your doctor says it is okay. When should you call for help? [id number] anytime you think you may need emergency care. For example, call if:  · You are unable to move an arm or a leg at all. Call your doctor now or seek immediate medical care if:  · You have new or worse symptoms in your arms, legs, chest, belly, or buttocks. Symptoms may include:  ? Numbness or tingling. ? Weakness. ? Pain. · You lose bladder or bowel control. Watch closely for changes in your health, and be sure to contact your doctor if:  · You are not getting better as expected. Where can you learn more? Go to https://Mobile Card.Marketocracy. org and sign in to your [employer] account. Enter U498 in the [facility] box to learn more about \"Pinched Nerve in the Neck: Care Instructions. \"     If you do not have an account, please click on the \"Sign Up Now\" link. Current as of: March 2, 2020               Content Version: 12.5  © 3114-3900 [employer]. Care instructions adapted under license by [facility] ([facility]). If you have questions about a medical condition or this instruction, always ask your healthcare professional. [place] [address] any warranty or liability for your use of this information.          Patient Education        Neck: Exercises  Introduction  Here are some examples of exercises for you to try. The exercises may be suggested for a condition or for rehabilitation. Start each exercise slowly. Ease off the exercises if you start to have pain. You will be told when to start these exercises and which ones will work best for you. How to do the exercises  Neck stretch   1. This stretch works best if you keep your shoulder down as you lean away from it. To help you remember to do this, start by relaxing your shoulders and lightly holding on to your thighs or your chair. 2. Tilt your head toward your shoulder and hold for 15 to 30 seconds. Let the weight of your head stretch your muscles. 3. If you would like a little added stretch, use your hand to gently and steadily pull your head toward your shoulder. For example, keeping your right shoulder down, lean your head to the left. 4. Repeat 2 to 4 times toward each shoulder. Diagonal neck stretch   1. Turn your head slightly toward the direction you will be stretching, and tilt your head diagonally toward your chest and hold for 15 to 30 seconds. 2. If you would like a little added stretch, use your hand to gently and steadily pull your head forward on the diagonal.  3. Repeat 2 to 4 times toward each side. Dorsal glide stretch   The dorsal glide stretches the back of the neck. If you feel pain, do not glide so far back. Some people find this exercise easier to do while lying on their backs with an ice pack on the neck. 1. Sit or stand tall and look straight ahead. 2. Slowly tuck your chin as you glide your head backward over your body  3. Hold for a count of 6, and then relax for up to 10 seconds. 4. Repeat 8 to 12 times. Chest and shoulder stretch   1. Sit or stand tall and glide your head backward as in the dorsal glide stretch. 2. Raise both arms so that your hands are next to your ears. 3. Take a deep breath, and as you breathe out, lower your elbows down and behind your back.  You will feel your shoulder blades slide down and together, and at the same time you will feel a stretch across your chest and the front of your shoulders. 4. Hold for about 6 seconds, and then relax for up to 10 seconds. 5. Repeat 8 to 12 times. Strengthening: Hands on head   1. Move your head backward, forward, and side to side against gentle pressure from your hands, holding each position for about 6 seconds. 2. Repeat 8 to 12 times. Follow-up care is a key part of your treatment and safety. Be sure to make and go to all appointments, and call your doctor if you are having problems. It's also a good idea to know your test results and keep a list of the medicines you take. Where can you learn more? Go to https://Zodio.Smartdate. org and sign in to your MeterH[employer] account. Enter P975 in the Portal Profes box to learn more about \"Neck: Exercises. \"     If you do not have an account, please click on the \"Sign Up Now\" link. Current as of: March 2, 2020               Content Version: 12.5  © 0537-7911 Healthwise, [employer] Care instructions adapted under license by [facility] ([facility]). If you have questions about a medical condition or this instruction, always ask your healthcare professional. [address] any warranty or liability for your use of this information. Patient Education        Neck Arthritis: Exercises  Introduction  Here are some examples of exercises for you to try. The exercises may be suggested for a condition or for rehabilitation. Start each exercise slowly. Ease off the exercises if you start to have pain. You will be told when to start these exercises and which ones will work best for you. How to do the exercises  Neck stretches to the side   1. This stretch works best if you keep your shoulder down as you lean away from it. To help you remember to do this, start by relaxing your shoulders and lightly holding on to your thighs or your chair.   2. Tilt your head toward your shoulder and hold for 15 to 30 seconds. Let the weight of your head stretch your muscles. 3. Repeat 2 to 4 times toward each shoulder. Chin tuck   1. Lie on the floor with a rolled-up towel under your neck. Your head should be touching the floor. 2. Slowly bring your chin toward your chest.  3. Hold for a count of 6, and then relax for up to 10 seconds. 4. Repeat 8 to 12 times. Active cervical rotation   1. Sit in a firm chair, or stand up straight. 2. Keeping your chin level, turn your head to the right, and hold for 15 to 30 seconds. 3. Turn your head to the left and hold for 15 to 30 seconds. 4. Repeat 2 to 4 times to each side. Shoulder blade squeeze   1. While standing, squeeze your shoulder blades together. 2. Do not raise your shoulders up as you are squeezing. 3. Hold for 6 seconds. 4. Repeat 8 to 12 times. Shoulder rolls   1. Sit comfortably with your feet shoulder-width apart. You can also do this exercise standing up. 2. Roll your shoulders up, then back, and then down in a smooth, circular motion. 3. Repeat 2 to 4 times. Follow-up care is a key part of your treatment and safety. Be sure to make and go to all appointments, and call your doctor if you are having problems. It's also a good idea to know your test results and keep a list of the medicines you take. Where can you learn more? Go to https://Witel.[employer] Research [employer] Development. org and sign in to your [employer] account. Enter F104 in the [facility] box to learn more about \"Neck Arthritis: Exercises. \"     If you do not have an account, please click on the \"Sign Up Now\" link. Current as of: March 2, 2020               Content Version: 12.5  © 7655-3692 Healthwise, Incorporated. Care instructions adapted under license by [facility] ([facility]).  If you have questions about a medical condition or this instruction, always ask your healthcare professional. Juan C Grubbss disclaims any warranty or liability for your use of this information.

## 2020-09-03 NOTE — PROGRESS NOTES
Cryotherapy Procedure Note    Pre-operative Diagnosis:actinic keratosis 1    Post-operative Diagnosis: same    Locations: Crown of head    Procedure Details   2 cycles of liquid nitrogen applied to affected sites. Complications: none. Plan:  1. Patient was educated regarding the potential risks of blister formation, discomfort, hypopigmentation, and scar. 2. Wound care was discussed. 3. Recommended that the patient use OTC analgesics as needed for pain. 4. Plan for RTC in 3-4 weeks for re-treatment if needed.

## 2020-09-09 ENCOUNTER — TELEPHONE (OUTPATIENT)
Dept: FAMILY MEDICINE CLINIC | Age: 65
End: 2020-09-09

## 2020-09-09 NOTE — TELEPHONE ENCOUNTER
----- Message from Ambrosiomirna Michelle sent at 9/9/2020  4:12 PM EDT -----  Subject: Appointment Request    Reason for Call: Routine Physical Exam    QUESTIONS  Type of Appointment? Established Patient  Reason for appointment request? Available appointments did not meet   patient need  Additional Information for Provider? Pt says that he is experiencing   severe pain in neck. was able to see an appt avail for today @ 4:45 but pt   says he was unable to cancel a previous engagement. After that   availability went to 9/29/20 and Pt says that is too far out b/c he is in   extreme pain. Pt asked that I contact the office to see   ---------------------------------------------------------------------------  --------------  [address]  What is the best way for the office to contact you? OK to leave message on   voicemail  Preferred Call Back Phone Number? [phone]  ---------------------------------------------------------------------------  --------------  SCRIPT ANSWERS  Relationship to Patient? Self  Appointment reason? Well Care/Follow Ups  Select a Well Care/Follow Ups appointment reason? Adult Physical Exam   [Medicare Annual Wellness   AWV   PAP   Pelvic]  (Is the patient requesting to be seen urgently for their symptoms?)? No  (If the patient is female   ask this question) Are you requesting a pap smear with your physical   exam? No  (Patient is Medicare and requesting Annual Wellness Visit)? No  Have you been diagnosed with   tested for   or told that you are suspected of having COVID-19 (Coronavirus)? No  Have you had a fever or taken medication to treat a fever within the past   3 days? No  Have you had a cough   shortness of breath or flu-like symptoms within the past 3 days? No  Do you currently have flu-like symptoms including fever or chills   cough   shortness of breath   or difficulty breathing   or new loss of taste or smell?  No  (Service Expert  click yes below to proceed with [employer] As Usual Scheduling)?  Yes

## 2020-09-11 ENCOUNTER — OFFICE VISIT (OUTPATIENT)
Dept: FAMILY MEDICINE CLINIC | Age: 65
End: 2020-09-11
Payer: COMMERCIAL

## 2020-09-11 VITALS
HEART RATE: 75 BPM | TEMPERATURE: 98.2 F | WEIGHT: 184 LBS | BODY MASS INDEX: 25.66 KG/M2 | SYSTOLIC BLOOD PRESSURE: 130 MMHG | OXYGEN SATURATION: 97 % | DIASTOLIC BLOOD PRESSURE: 80 MMHG

## 2020-09-11 PROCEDURE — 99214 OFFICE O/P EST MOD 30 MIN: CPT | Performed by: FAMILY MEDICINE

## 2020-09-11 RX ORDER — TIZANIDINE 4 MG/1
4 TABLET ORAL NIGHTLY PRN
Qty: 20 TABLET | Refills: 0 | Status: SHIPPED | OUTPATIENT
Start: 2020-09-11 | End: 2020-10-12

## 2020-09-11 RX ORDER — DICLOFENAC SODIUM 75 MG/1
75 TABLET, DELAYED RELEASE ORAL 2 TIMES DAILY
Qty: 60 TABLET | Refills: 1 | Status: SHIPPED | OUTPATIENT
Start: 2020-09-11 | End: 2021-06-08 | Stop reason: ALTCHOICE

## 2020-09-11 NOTE — PROGRESS NOTES
Subjective:      Patient ID: Patricio Steel is a 72 y.o. male. CC: Patient today for evaluation of his neck pain    HPI Patient presents with severe neck pain. He has completely his Prednisone but it didn't help any. Patient states today he has headache because of it. He has tried the exercises and cold compress to the area but did not help. Patient feels most of the symptoms worsen at nighttime and he has difficulty sleeping because of the discomfort. The discomfort is always on the right side of his neck with radiation to his right scalp. No arm weakness of any sort. Review of Systems     Allergies   Allergen Reactions    Seasonal      sneezing       Objective:   Physical Exam  Constitutional:       General: He is not in acute distress. Appearance: He is well-developed. Musculoskeletal:      Right shoulder: He exhibits no tenderness, no swelling and no deformity. Left shoulder: He exhibits no tenderness, no swelling and no laceration. Cervical back: He exhibits spasm. He exhibits no tenderness, no swelling and no deformity. Skin:     General: Skin is warm and dry. Findings: No rash. Neurological:      Mental Status: He is alert and oriented to person, place, and time. Sensory: Sensation is intact. Motor: Motor function is intact. Coordination: Coordination is intact. Deep Tendon Reflexes:      Reflex Scores:       Tricep reflexes are 2+ on the right side and 2+ on the left side. Bicep reflexes are 2+ on the right side and 2+ on the left side. Psychiatric:         Behavior: Behavior is cooperative. Back Exam     Tenderness   The patient is experiencing tenderness in the cervical.    Range of Motion   Extension: abnormal   Flexion: normal   Rotation right: normal   Rotation left: normal             Assessment:      Frankie Serra was seen today for neck pain.     Diagnoses and all orders for this visit:    Cervical radiculopathy  -     MRI Cervical Spine Wo Conrast; Future    Other orders  -     tiZANidine (ZANAFLEX) 4 MG tablet; Take 1 tablet by mouth nightly as needed (neck pain)  -     diclofenac (VOLTAREN) 75 MG EC tablet; Take 1 tablet by mouth 2 times daily Take with food            Plan:      Since he did not respond to prednisone medication will place him on anti-inflammatory medication muscle relaxant at nighttime. Advised him to stop doing any exercise but he still continues ice to the affected area. Patient was referred for an MRI scan of the cervical spine and we discussed possible treatments after MRI scan including epidural injections versus surgical intervention versus physical therapy. RTC pending evaluation    Please note that this chart was generated using Dragon dictation software. Although every effort was made to ensure the accuracy of this automated transcription, some errors in transcription may have occurred.

## 2020-09-17 ENCOUNTER — HOSPITAL ENCOUNTER (OUTPATIENT)
Dept: MRI IMAGING | Age: 65
Discharge: HOME | End: 2020-09-17
Payer: COMMERCIAL

## 2020-09-17 PROCEDURE — 72141 MRI NECK SPINE W/O DYE: CPT

## 2020-09-18 ENCOUNTER — TELEPHONE (OUTPATIENT)
Dept: FAMILY MEDICINE CLINIC | Age: 65
End: 2020-09-18

## 2020-09-18 NOTE — TELEPHONE ENCOUNTER
----- Message from Esteban Pimentel MD sent at 9/18/2020  7:16 AM EDT -----  MRI scan demonstrates osteoarthritis involving the entire neck. Worse at the lower level.   Recommendation would either be physical therapy or consultation with Dr. Paty Ma for possible epidural injection

## 2020-09-28 ENCOUNTER — HOSPITAL ENCOUNTER (OUTPATIENT)
Dept: PHYSICAL THERAPY | Age: 65
Setting detail: THERAPIES SERIES
Discharge: HOME | End: 2020-09-28
Payer: COMMERCIAL

## 2020-09-28 PROCEDURE — 97161 PT EVAL LOW COMPLEX 20 MIN: CPT | Performed by: PHYSICAL THERAPIST

## 2020-09-28 PROCEDURE — 97140 MANUAL THERAPY 1/> REGIONS: CPT | Performed by: PHYSICAL THERAPIST

## 2020-09-28 PROCEDURE — 97110 THERAPEUTIC EXERCISES: CPT | Performed by: PHYSICAL THERAPIST

## 2020-09-28 NOTE — PLAN OF CARE
[facility] Corinne Macias, [address]  Phone: [phone]  Fax: [phone]         Physical Therapy Certification    Dear Referring Practitioner: Verlean Sicard, MD,    We had the pleasure of evaluating the following patient for physical therapy services at [address] [place] [address]. A summary of our findings can be found in the initial assessment below. This includes our plan of care. If you have any questions or concerns regarding these findings, please do not hesitate to contact me at the office phone number checked above. Thank you for the referral.       Physician Signature:_______________________________Date:__________________  By signing above (or electronic signature), therapists plan is approved by physician      Patient: Duncan Torres   : 1955   MRN: [id number]  Referring Physician: Referring Practitioner: Verlean Sicard, MD      Evaluation Date: 2020      Medical Diagnosis Information:  Diagnosis: M47.22  Cx OA with radiculopathy   Treatment Diagnosis: Cx strain with pain and mm gaurding                                         Insurance information: PT Insurance Information: BCBS   20V PCY    Precautions/ Contra-indications:     C-SSRS Triggered by Intake questionnaire (Past 2 wk assessment ):   [x] No, Questionnaire did not trigger screening.   [] Yes, Patient intake triggered C-SSRS Screening      [] C-SSRS Screening completed  [] PCP notified via Epic    Latex Allergy:  [x]NO      []YES  Preferred Language for Healthcare:   [x]English       []other:    SUBJECTIVE: Patient stated complaint: Pt reports onset of R sided Cx pain about 1 month ago. He had stopped going to the gym since quarantine and feels that co-insided with pain. He gets popping in the neck ml with cx flexion. Meds have not touched the pain.  Pt was referred to PT by his PCP. Fear avoidance: I should not do physical activities that (might) make my pain worse   [] True   [x] False     Relevant Medical History:  Functional Outcome: NDI: raw score = 6; dysfunction =12 %    Pain Scale: 2-5/10  Easing factors: rest  Provocative factors: running, sleep, riding motorcycle     Type: [x]Constant   []Intermittent  []Radiating []Localized []other:     Numbness/Tingling: none    Occupation/School: [employer]      Living Status/Prior Level of Function: Prior to this injury / incident, pt was independent with ADLs and IADLs,  No sleep disturbance, able to ride motorcycle and work out at [employer]. OBJECTIVE:   Palpation: tender at R proximal UT and LS with trigger pts in B UT    Functional Mobility/Transfers: WFLs    Posture: guarded in CPVM    Bandages/Dressings/Incisions: NA    Gait: (include devices/WB status):  WNL     Dermatomes Normal Abnormal Comments   Top of head (C1) X     Posterior occipital region (C2) X     Side of neck (C3) X     Top of shoulder (C4) X     Lateral deltoid (C5) X     Tip of thumb (C6) X     Distal middle finger (C7) X     Distal fifth finger (C8) X     Medial forearm (T1) X               Reflexes Normal Abnormal Comments   C5-6 Biceps X     C5-6 Brachioradialis X     C7-8 Triceps X     Quinoness X         CERV ROM     Cervical Flexion 30    Cervical Extension 50    Cervical SB 50 40   Cervical rotation 82 75   SHLD AROM WNL WNL   Strength / Myotomes Left Right   Cervical Flexion (C1-2) WNL WNL   Cervical Side-bending (C3) WNL WNL   Shoulder Shrug (C4) 5 5   Shoulder Flex 5 5   Shoulder Scap 5 5   Shoulder Abduction (C5) 5 5   Shoulder ER 5 5   Shoulder IR 5 5   Biceps (C6) 5 5   Triceps (C7) 5 5     Lower extremity myotomes:   [x]Normal     []Abnormal     Joint mobility: c-spine   []Normal    [x]Hypo   []Hyper    Orthopaedic Special Tests Positive  Negative  NT Comments    Hautard's   x     Rhomberg  x     Sharps-Wali  x     Cervical Torsion / Body intervention due to being higher risk   TUG score (>12s at risk):     [] Falls education provided, including         ASSESSMENT:    Functional Impairments:     [x]Noted cervical/thoracic/GHJ joint hypomobility   []Noted cervical/thoracic/GHJ joint hypermobility   []Decreased cervical/UE functional ROM   []Noted Headache pain aggravated by neck movements with/without dizziness   []Abnormal reflexes/sensation/myotomal/dermatomal deficits   []Decreased DCF control or ability to hold head up   []Decreased RC/scapular/core strength and neuromuscular control    []Decreased UE functional strength   []other:      Functional Activity Limitations (from functional questionnaire and intake)   [x]Reduced ability to tolerate prolonged functional positions   [x]Reduced ability or difficulty with changes of positions or transfers between positions   []Reduced ability to maintain good posture and demonstrate good body mechanics with sitting, bending, and lifting   [x] Reduced ability or tolerance with driving and/or computer work   [x]Reduced ability to perform lifting, reaching, carrying tasks   []Reduced ability to concentrate   [x]Reduced ability to sleep    []Reduced ability to tolerate any impact through UE or spine   []Reduced ability to ambulate prolonged functional periods/distances   []other:    Participation Restrictions   []Reduced participation in self care activities   []Reduced participation in home management activities   []Reduced participation in work activities   [x]Reduced participation in social activities. [x]Reduced participation in sport/recreational activities.     Classification/Subgrouping:   []signs/symptoms consistent with neck pain with mobility deficits     []signs/symptoms consistent with neck pain with movement coordinated impairments    []signs/symptoms consistent with neck pain with radiating pain    []signs/symptoms consistent with neck pain with headaches (cervicogenic)    []Signs/symptoms consistent with nerve root involvement including myotome & dermatome dysfunction   [x]sign/symptoms consistent with facet dysfunction of cervical and thoracic spine    []signs/symptoms consistent suggesting central cord compression/UMN syndromes   []signs/symptoms consistent with discogenic cervical pain   []signs/symptoms consistent with rib dysfunction   [x]signs/symptoms consistent with postural dysfunction   []signs/symptoms consistent with shoulder pathology    []signs/symptoms consistent with post-surgical status including decreased ROM, strength and function.    []signs/symptoms consistent with pathology which may benefit from Dry Needling   []signs/symptoms which may limit the use of advanced manual therapy techniques: (Hypertension, recent trauma, intolerance to end range positions, prior TIA, visual issues, UE myotomes loss )     Prognosis/Rehab Potential:      []Excellent   [x]Good    []Fair   []Poor    Tolerance of evaluation/treatment:    []Excellent   [x]Good    []Fair   []Poor    Physical Therapy Evaluation Complexity Justification  [x] A history of present problem with:  [] no personal factors and/or comorbidities that impact the plan of care;  [x]1-2 personal factors and/or comorbidities that impact the plan of care  []3 personal factors and/or comorbidities that impact the plan of care  [x] An examination of body systems using standardized tests and measures addressing any of the following: body structures and functions (impairments), activity limitations, and/or participation restrictions;:  [x] a total of 1-2 or more elements   [] a total of 3 or more elements   [] a total of 4 or more elements   [x] A clinical presentation with:  [x] stable and/or uncomplicated characteristics   [] evolving clinical presentation with changing characteristics  [] unstable and unpredictable characteristics;   [x] Clinical decision making of [x] low, [] moderate, [] high complexity using standardized patient assessment instrument and/or measurable assessment of functional outcome. [x] EVAL (LOW) 34212 (typically 20 minutes face-to-face)  [] EVAL (MOD) 78251 (typically 30 minutes face-to-face)  [] EVAL (HIGH) 86625 (typically 45 minutes face-to-face)  [] RE-EVAL     PLAN:   Frequency/Duration:  2 days per week for 4-6 Weeks:  Interventions:  [x]  Therapeutic exercise including: strength training, ROM, for cervical spine,scapula, core and Upper extremity, including postural re-education. [x]  NMR activation and proprioception for Deep cervical flexors, periscapular and RC muscles and Core, including postural re-education. [x]  Manual therapy as indicated for C/T spine, ribs, Soft tissue to include: Dry Needling/IASTM, STM, PROM, Gr I-IV mobilizations, manipulation. [x] Modalities as needed that may include: thermal agents, E-stim, Biofeedback, US, iontophoresis as indicated  [x] Patient education on joint protection, postural re-education, activity modification, progression of HEP. HEP instruction: Written HEP instructions provided and reviewed     GOALS:  Patient stated goal: no more pain  [] Progressing: [] Met: [] Not Met: [] Adjusted    Therapist goals for Patient:   Short Term Goals: To be achieved in: 2 weeks  1. Independent in HEP and progression per patient tolerance, in order to prevent re-injury. [] Progressing: [] Met: [] Not Met: [] Adjusted  2. Patient will have a decrease in pain to facilitate improvement in movement, function, and ADLs as indicated by Functional Deficits. [] Progressing: [] Met: [] Not Met: [] Adjusted    Long Term Goals: To be achieved in: 4-6 weeks  1. Disability index score of 10% or less for the NDI to assist with reaching prior level of function. [] Progressing: [] Met: [] Not Met: [] Adjusted  2. Patient will demonstrate increased AROM to [facility] of cervical/thoracic spine to allow for proper joint functioning as indicated by patients Functional Deficits.    [] Progressing: [] Met: [] Not Met: [] Adjusted  3. Patient will demonstrate an increase in postural awareness and control and activation of  Deep cervical stabilizers to allow for proper functional mobility as indicated by patients Functional Deficits. [] Progressing: [] Met: [] Not Met: [] Adjusted  4. Patient will return to functional activities including sleeping and riding his motorcycle without increased symptoms or restriction. [] Progressing: [] Met: [] Not Met: [] Adjusted  5.  Patient will have minimal mm guarding in CPVM  [] Progressing: [] Met: [] Not Met: [] Adjusted     Electronically signed by:  Benji Vann, PTMPT [id number]

## 2020-09-28 NOTE — FLOWSHEET NOTE
NMR re-education (74663)                                          Manual Intervention (43686)       Cerv mobs   2'    SOR   2'    Manual Cx traction   5'           STM with/without hawkgrips   8'               Patient education:  -pt educated on diagnosis, prognosis and expectations for rehab  -all pt questions were answered    Home exercise program:     Access Code: [email or web address]   URL: [employer]/   Date: 09/28/2020   Prepared by: Carissa Garzon     Exercises   Standing Cervical Sidebending AROM - 3 reps - 20 hold - 1x daily - 7x weekly   Cervical AROM Flexion and Rotation - 3 reps - 20 hold - 1x daily - 7x weekly   Seated Cervical Retraction - 10 reps - 5 hold - 1x daily - 7x weekly     Therapeutic Exercise and NMR EXR  [] (01647) Provided verbal/tactile cueing for activities related to strengthening, flexibility, endurance, ROM  for improvements in cervical, postural, scapular, scapulothoracic and UE control with self care, reaching, carrying, lifting, house/yardwork, driving/computer work.    [] (82367) Provided verbal/tactile cueing for activities related to improving balance, coordination, kinesthetic sense, posture, motor skill, proprioception  to assist with cervical, scapular, scapulothoracic and UE control with self care, reaching, carrying, lifting, house/yardwork, driving/computer work. Therapeutic Activities:    [x] (78608 or 28338) Provided verbal/tactile cueing for activities related to improving balance, coordination, kinesthetic sense, posture, motor skill, proprioception and motor activation to allow for proper function of cervical, scapular, scapulothoracic and UE control with self care, carrying, lifting, driving/computer work.      Home Exercise Program:    [x] (04984) Reviewed/Progressed HEP activities related to strengthening, flexibility, endurance, ROM of cervical, scapular, scapulothoracic and UE control with self care, reaching, carrying, lifting, house/yardwork, driving/computer work  [] (55932) Reviewed/Progressed HEP activities related to improving balance, coordination, kinesthetic sense, posture, motor skill, proprioception of cervical, scapular, scapulothoracic and UE control with self care, reaching, carrying, lifting, house/yardwork, driving/computer work      Manual Treatments:  PROM / STM / Oscillations-Mobs:  G-I, II, III, IV (PA's, Inf., Post.)  [x] (09616) Provided manual therapy to mobilize soft tissue/joints of cervical/CT, scapular GHJ and UE for the purpose of decreasing headache, modulating pain, promoting relaxation,  increasing ROM, reducing/eliminating soft tissue swelling/inflammation/restriction, improving soft tissue extensibility and allowing for proper ROM for normal function with self care, reaching, carrying, lifting, house/yardwork, driving/computer work    Modalities:      Charges:  Timed Code Treatment Minutes: 30   Total Treatment Minutes: 48       [x] EVAL - LOW (53271)   [] EVAL - MOD (40570)  [] EVAL - HIGH (65829)  [] RE-EVAL (30862)  [x] TE (94188) x  1    [] Ionto (83120)  [] NMR (62019) x      [] Vaso (44585)  [x] Manual (42554) x  1    [] Ultrasound  [] TA (70565) x      [] Mech Traction (00776)  [] Gait Training x     [] ES (un) (07519):   [] Aquatic therapy x   [] Other:   [] Group:       GOALS:  Patient stated goal: no more pain  []? Progressing: []? Met: []? Not Met: []? Adjusted     Therapist goals for Patient:   Short Term Goals: To be achieved in: 2 weeks  1. Independent in HEP and progression per patient tolerance, in order to prevent re-injury. []? Progressing: []? Met: []? Not Met: []? Adjusted  2. Patient will have a decrease in pain to facilitate improvement in movement, function, and ADLs as indicated by Functional Deficits. []? Progressing: []? Met: []? Not Met: []? Adjusted     Long Term Goals: To be achieved in: 4-6 weeks  1.  Disability index score of 10% or less for the NDI to assist with reaching prior level of function. []? Progressing: []? Met: []? Not Met: []? Adjusted  2. Patient will demonstrate increased AROM to [facility] of cervical/thoracic spine to allow for proper joint functioning as indicated by patients Functional Deficits. []? Progressing: []? Met: []? Not Met: []? Adjusted  3. Patient will demonstrate an increase in postural awareness and control and activation of  Deep cervical stabilizers to allow for proper functional mobility as indicated by patients Functional Deficits. []? Progressing: []? Met: []? Not Met: []? Adjusted  4. Patient will return to functional activities including sleeping and riding his motorcycle without increased symptoms or restriction. []? Progressing: []? Met: []? Not Met: []? Adjusted  5. Patient will have minimal mm guarding in CPVM  []? Progressing: []? Met: []? Not Met: []? Adjusted         Overall Progression Towards Functional goals/ Treatment Progress Update:  [] Patient is progressing as expected towards functional goals listed. [] Progression is slowed due to complexities/Impairments listed. [] Progression has been slowed due to co-morbidities.   [x] Plan just implemented, too soon to assess goals progression <30days   [] Goals require adjustment due to lack of progress  [] Patient is not progressing as expected and requires additional follow up with physician  [] Other    Persisting Functional Limitations/Impairments:  []Sitting []Standing   []Walking []Squatting/bending    []Stairs []ADL's    []Transfers []Reaching  []Housework [x]Lifting  []Driving []Job related tasks  [x]Sports/Recreation  []Sleeping  []Other:    ASSESSMENT:  See eval     Treatment/Activity Tolerance:  [x] Patient able to complete tx  [] Patient limited by fatique  [] Patient limited by pain  [] Patient limited by other medical complications  [] Other:     Prognosis: [x] Good [] Fair  [] Poor    Patient Requires Follow-up: [x] Yes  [] No    Plan for next treatment session:    PLAN: See eval. PT 2x / week for 4-6 weeks. [] Continue per plan of care [] Alter current plan (see comments)  [x] Plan of care initiated [] Hold pending MD visit [] Discharge    Electronically signed by: Peggy Shabazz, PT MPT [id number]      Note: If patient does not return for scheduled/ recommended follow up visits, this note will serve as a discharge from care along with most recent update on progress.

## 2020-10-01 ENCOUNTER — HOSPITAL ENCOUNTER (OUTPATIENT)
Dept: PHYSICAL THERAPY | Age: 65
Setting detail: THERAPIES SERIES
Discharge: HOME | End: 2020-10-01
Payer: COMMERCIAL

## 2020-10-01 PROCEDURE — 97110 THERAPEUTIC EXERCISES: CPT | Performed by: PHYSICAL THERAPIST

## 2020-10-01 PROCEDURE — 97140 MANUAL THERAPY 1/> REGIONS: CPT | Performed by: PHYSICAL THERAPIST

## 2020-10-01 NOTE — FLOWSHEET NOTE
Gideon Aguilar  Phone: [phone]  Fax: [phone]    Physical Therapy Treatment Note/ Progress Report:     Date:  10/1/2020    Patient Name:  Malina Maza :  1955  MRN: [id number]  Restrictions/Precautions:    Medical/Treatment Diagnosis Information:  Diagnosis: M47.22  Cx OA with radiculopathy  Treatment Diagnosis: Cx strain with pain and mm gaurding  Insurance/Certification information:  PT Insurance Information: [employer] Joseph  Physician Information:  Referring Practitioner: Cookie Chin MD  Plan of care signed (Y/N): []  Yes  [x]  No     Date of Patient follow up with Physician:      Progress Report: []  Yes  [x]  No     Date Range for reporting period:  Beginnin20  Ending:     Progress report due (10 Rx/or 30 days whichever is less):    Recertification due (POC duration/ or 90 days whichever is less):    Visit # Insurance Allowable Auth required? Date Range   20 total  eval + auth x 5 []  Yes  []  No    -     Latex Allergy:  [x]NO      []YES  Preferred Language for Healthcare:   [x]English       []other:    Functional Scale:        Date assessed:  NDI: raw score = 6; dysfunction = 12%   20    Pain level:  5/10     History:  Patient stated complaint: Pt reports onset of R sided Cx pain about 1 month ago. He had stopped going to the gym since quarantine and feels that co-insided with pain. He gets popping in the neck ml with cx flexion. Meds have not touched the pain. Pt was referred to PT by his PCP.     SUBJECTIVE:  Conts to have popping and cracking  OBJECTIVE: See eval   Observation:    Test measurements:      RESTRICTIONS/PRECAUTIONS:     Exercises/Interventions:   Therapeutic Exercise (68787) Resistance / level Sets/sec Reps Notes   UBE  retro  2'     Ball on wall - cx  Chin nod  TB B ER  TB pull apart  TB diagonals     3#  Orange  orange          10  15  10  10                  Doorway pect str  30\" 2                                Therapeutic Activities (33499)       Wall pushup       CC:   mid row  High row   15#   2   10                                NMR re-education (24182)                                          Manual Intervention (97083)       Cerv mobs   4'    SOR   2'    Manual Cx traction   5'           STM with/without hawkgrips   8' PT SEATED              Patient education:  -pt educated on diagnosis, prognosis and expectations for rehab  -all pt questions were answered    Home exercise program:   10/1: doorway str    Access Code: [email or web address]   URL: [email or web address]   Date: 09/28/2020   Prepared by: Stiven Schultz     Exercises   Standing Cervical Sidebending AROM - 3 reps - 20 hold - 1x daily - 7x weekly   Cervical AROM Flexion and Rotation - 3 reps - 20 hold - 1x daily - 7x weekly   Seated Cervical Retraction - 10 reps - 5 hold - 1x daily - 7x weekly     Therapeutic Exercise and NMR EXR  [x] (54509) Provided verbal/tactile cueing for activities related to strengthening, flexibility, endurance, ROM  for improvements in cervical, postural, scapular, scapulothoracic and UE control with self care, reaching, carrying, lifting, house/yardwork, driving/computer work.    [] (55905) Provided verbal/tactile cueing for activities related to improving balance, coordination, kinesthetic sense, posture, motor skill, proprioception  to assist with cervical, scapular, scapulothoracic and UE control with self care, reaching, carrying, lifting, house/yardwork, driving/computer work. Therapeutic Activities:    [] (20713 or 80178) Provided verbal/tactile cueing for activities related to improving balance, coordination, kinesthetic sense, posture, motor skill, proprioception and motor activation to allow for proper function of cervical, scapular, scapulothoracic and UE control with self care, carrying, lifting, driving/computer work.      Home Exercise Program:    [x] (85109) Deficits. [x]? Progressing: []? Met: []? Not Met: []? Adjusted     Long Term Goals: To be achieved in: 4-6 weeks  1. Disability index score of 10% or less for the NDI to assist with reaching prior level of function. [x]? Progressing: []? Met: []? Not Met: []? Adjusted  2. Patient will demonstrate increased AROM to [facility] of cervical/thoracic spine to allow for proper joint functioning as indicated by patients Functional Deficits. [x]? Progressing: []? Met: []? Not Met: []? Adjusted  3. Patient will demonstrate an increase in postural awareness and control and activation of  Deep cervical stabilizers to allow for proper functional mobility as indicated by patients Functional Deficits. [x]? Progressing: []? Met: []? Not Met: []? Adjusted  4. Patient will return to functional activities including sleeping and riding his motorcycle without increased symptoms or restriction. [x]? Progressing: []? Met: []? Not Met: []? Adjusted  5. Patient will have minimal mm guarding in CPVM  [x]? Progressing: []? Met: []? Not Met: []? Adjusted         Overall Progression Towards Functional goals/ Treatment Progress Update:  [] Patient is progressing as expected towards functional goals listed. [] Progression is slowed due to complexities/Impairments listed. [] Progression has been slowed due to co-morbidities. [x] Plan just implemented, too soon to assess goals progression <30days   [] Goals require adjustment due to lack of progress  [] Patient is not progressing as expected and requires additional follow up with physician  [] Other    Persisting Functional Limitations/Impairments:  []Sitting []Standing   []Walking []Squatting/bending    []Stairs []ADL's    []Transfers []Reaching  []Housework [x]Lifting  []Driving []Job related tasks  [x]Sports/Recreation  []Sleeping  []Other:    ASSESSMENT: Pt tolerated initial postural strengthening. Cont manual tx with relief.      Treatment/Activity Tolerance:  [x] Patient able to complete tx  [] Patient limited by fatique  [] Patient limited by pain  [] Patient limited by other medical complications  [] Other:     Prognosis: [x] Good [] Fair  [] Poor    Patient Requires Follow-up: [x] Yes  [] No    Plan for next treatment session:    PLAN: See eval. PT 2x / week for 4-6 weeks. [x] Continue per plan of care [] Alter current plan (see comments)  [] Plan of care initiated [] Hold pending MD visit [] Discharge    Electronically signed by: Lucia Iqbal, PT MPT [id number]      Note: If patient does not return for scheduled/ recommended follow up visits, this note will serve as a discharge from care along with most recent update on progress.

## 2020-10-05 ENCOUNTER — HOSPITAL ENCOUNTER (OUTPATIENT)
Dept: PHYSICAL THERAPY | Age: 65
Setting detail: THERAPIES SERIES
Discharge: HOME | End: 2020-10-05
Payer: COMMERCIAL

## 2020-10-05 PROCEDURE — 97035 APP MDLTY 1+ULTRASOUND EA 15: CPT | Performed by: PHYSICAL THERAPIST

## 2020-10-05 PROCEDURE — 97140 MANUAL THERAPY 1/> REGIONS: CPT | Performed by: PHYSICAL THERAPIST

## 2020-10-05 PROCEDURE — 97110 THERAPEUTIC EXERCISES: CPT | Performed by: PHYSICAL THERAPIST

## 2020-10-05 NOTE — FLOWSHEET NOTE
[facility] [address]  Phone: [phone]  Fax: [phone]    Physical Therapy Treatment Note/ Progress Report:     Date:  10/5/2020    Patient Name:  Randee Orona :  1955  MRN: [id number]  Restrictions/Precautions:    Medical/Treatment Diagnosis Information:  Diagnosis: M47.22  Cx OA with radiculopathy  Treatment Diagnosis: Cx strain with pain and mm gaurding  Insurance/Certification information:  PT Insurance Information: [employer] Joseph  Physician Information:  Referring Practitioner: Pat Morrison MD  Plan of care signed (Y/N): []  Yes  [x]  No     Date of Patient follow up with Physician:      Progress Report: []  Yes  [x]  No     Date Range for reporting period:  Beginnin20  Ending:     Progress report due (10 Rx/or 30 days whichever is less):    Recertification due (POC duration/ or 90 days whichever is less):    Visit # Insurance Allowable Auth required? Date Range   3/6 20 total  eval + auth x 5 []  Yes  []  No    -     Latex Allergy:  [x]NO      []YES  Preferred Language for Healthcare:   [x]English       []other:    Functional Scale:        Date assessed:  NDI: raw score = 6; dysfunction = 12%   20    Pain level:  3/10     History:  Patient stated complaint: Pt reports onset of R sided Cx pain about 1 month ago. He had stopped going to the gym since quarantine and feels that co-insided with pain. He gets popping in the neck ml with cx flexion. Meds have not touched the pain. Pt was referred to PT by his PCP.     SUBJECTIVE:  Pt reports minimal pain with SB but conts to have popping and cracking in R side  OBJECTIVE: See eval   Observation: trigger point in R LS   Test measurements:      RESTRICTIONS/PRECAUTIONS:     Exercises/Interventions:   Therapeutic Exercise (77889) Resistance / level Sets/sec Reps Notes   UBE  retro  2'     Ball on wall - cx  Chin nod  TB B ER  TB pull apart  TB diagonals and UE control with self care, carrying, lifting, driving/computer work. Home Exercise Program:    [x] (37645) Reviewed/Progressed HEP activities related to strengthening, flexibility, endurance, ROM of cervical, scapular, scapulothoracic and UE control with self care, reaching, carrying, lifting, house/yardwork, driving/computer work  [] (39172) Reviewed/Progressed HEP activities related to improving balance, coordination, kinesthetic sense, posture, motor skill, proprioception of cervical, scapular, scapulothoracic and UE control with self care, reaching, carrying, lifting, house/yardwork, driving/computer work      Manual Treatments:  PROM / STM / Oscillations-Mobs:  G-I, II, III, IV (PA's, Inf., Post.)  [x] (47012) Provided manual therapy to mobilize soft tissue/joints of cervical/CT, scapular GHJ and UE for the purpose of decreasing headache, modulating pain, promoting relaxation,  increasing ROM, reducing/eliminating soft tissue swelling/inflammation/restriction, improving soft tissue extensibility and allowing for proper ROM for normal function with self care, reaching, carrying, lifting, house/yardwork, driving/computer work    Modalities:  10/5: trial US @100% 1.5w/cm x 8min to R cx with biofreeze , K-tape applied after manual  Charges:  Timed Code Treatment Minutes: 45   Total Treatment Minutes: 45       [] EVAL - LOW (22275)   [] EVAL - MOD (46690)  [] EVAL - HIGH (35161)  [] RE-EVAL (46644)  [x] TE (69045) x  1    [] Ionto (04547)  [] NMR (19042) x      [] Vaso (33536)  [x] Manual (66199) x  1   [x] Ultrasound  [] TA (54080) x      [] Mech Traction (22584)  [] Gait Training x     [] ES (un) (90217):   [] Aquatic therapy x   [] Other:   [] Group:       GOALS:  Patient stated goal: no more pain  [x]? Progressing: []? Met: []? Not Met: []? Adjusted     Therapist goals for Patient:   Short Term Goals: To be achieved in: 2 weeks  1.  Independent in HEP and progression per patient tolerance, in order to prevent re-injury. [x]? Progressing: []? Met: []? Not Met: []? Adjusted  2. Patient will have a decrease in pain to facilitate improvement in movement, function, and ADLs as indicated by Functional Deficits. [x]? Progressing: []? Met: []? Not Met: []? Adjusted     Long Term Goals: To be achieved in: 4-6 weeks  1. Disability index score of 10% or less for the NDI to assist with reaching prior level of function. [x]? Progressing: []? Met: []? Not Met: []? Adjusted  2. Patient will demonstrate increased AROM to [facility] of cervical/thoracic spine to allow for proper joint functioning as indicated by patients Functional Deficits. [x]? Progressing: []? Met: []? Not Met: []? Adjusted  3. Patient will demonstrate an increase in postural awareness and control and activation of  Deep cervical stabilizers to allow for proper functional mobility as indicated by patients Functional Deficits. [x]? Progressing: []? Met: []? Not Met: []? Adjusted  4. Patient will return to functional activities including sleeping and riding his motorcycle without increased symptoms or restriction. [x]? Progressing: []? Met: []? Not Met: []? Adjusted  5. Patient will have minimal mm guarding in CPVM  [x]? Progressing: []? Met: []? Not Met: []? Adjusted         Overall Progression Towards Functional goals/ Treatment Progress Update:  [] Patient is progressing as expected towards functional goals listed. [] Progression is slowed due to complexities/Impairments listed. [] Progression has been slowed due to co-morbidities.   [x] Plan just implemented, too soon to assess goals progression <30days   [] Goals require adjustment due to lack of progress  [] Patient is not progressing as expected and requires additional follow up with physician  [] Other    Persisting Functional Limitations/Impairments:  []Sitting []Standing   []Walking []Squatting/bending    []Stairs []ADL's    []Transfers []Reaching  []Housework [x]Lifting  []Driving []Job related tasks  [x]Sports/Recreation  []Sleeping  []Other:    ASSESSMENT: Pt tolerated postural strengthening. He notes less pain but popping continues. Cont manual tx with relief. Added US and K-tape to tx today to address LS trigger point. Treatment/Activity Tolerance:  [x] Patient able to complete tx  [] Patient limited by fatique  [] Patient limited by pain  [] Patient limited by other medical complications  [] Other:     Prognosis: [x] Good [] Fair  [] Poor    Patient Requires Follow-up: [x] Yes  [] No    Plan for next treatment session:    PLAN: See eval. PT 2x / week for 4-6 weeks. [x] Continue per plan of care [] Alter current plan (see comments)  [] Plan of care initiated [] Hold pending MD visit [] Discharge    Electronically signed by: Luis Cook PT MPT [id number]      Note: If patient does not return for scheduled/ recommended follow up visits, this note will serve as a discharge from care along with most recent update on progress.

## 2020-10-08 ENCOUNTER — HOSPITAL ENCOUNTER (OUTPATIENT)
Dept: PHYSICAL THERAPY | Age: 65
Setting detail: THERAPIES SERIES
Discharge: HOME | End: 2020-10-08
Payer: COMMERCIAL

## 2020-10-08 PROCEDURE — 97110 THERAPEUTIC EXERCISES: CPT | Performed by: PHYSICAL THERAPIST

## 2020-10-08 PROCEDURE — 97035 APP MDLTY 1+ULTRASOUND EA 15: CPT | Performed by: PHYSICAL THERAPIST

## 2020-10-08 PROCEDURE — 97140 MANUAL THERAPY 1/> REGIONS: CPT | Performed by: PHYSICAL THERAPIST

## 2020-10-08 NOTE — FLOWSHEET NOTE
Gideon Aguilar  Phone: [phone]  Fax: [phone]    Physical Therapy Treatment Note/ Progress Report:     Date:  10/8/2020    Patient Name:  Megan Mckeon :  1955  MRN: [id number]  Restrictions/Precautions:    Medical/Treatment Diagnosis Information:  Diagnosis: M47.22  Cx OA with radiculopathy  Treatment Diagnosis: Cx strain with pain and mm gaurding  Insurance/Certification information:  PT Insurance Information: [employer] Joseph  Physician Information:  Referring Practitioner: Amber Webb MD  Plan of care signed (Y/N): []  Yes  [x]  No     Date of Patient follow up with Physician:      Progress Report: []  Yes  [x]  No     Date Range for reporting period:  Beginnin20  Ending:     Progress report due (10 Rx/or 30 days whichever is less):    Recertification due (POC duration/ or 90 days whichever is less):    Visit # Insurance Allowable Auth required? Date Range   20 total  eval + auth x 5 []  Yes  []  No    -     Latex Allergy:  [x]NO      []YES  Preferred Language for Healthcare:   [x]English       []other:    Functional Scale:        Date assessed:  NDI: raw score = 6; dysfunction = 12%   20    Pain level:  3/10     History:  Patient stated complaint: Pt reports onset of R sided Cx pain about 1 month ago. He had stopped going to the gym since quarantine and feels that co-insided with pain. He gets popping in the neck ml with cx flexion. Meds have not touched the pain. Pt was referred to PT by his PCP. SUBJECTIVE:  Pt had a good day Tuday with less pain and cracking but had a bad day yesterday. His pain was on R side, mid cx-spine, at suboccipitals and LS. He reports sleep disturbance last night. Pt also notes that when he has pain there is less cracking. He is frustrated this am with how long this pain has lasted and that it has impacted his lifestyle.     OBJECTIVE: See eval   Observation: trigger point in R LS   Test measurements:      RESTRICTIONS/PRECAUTIONS:     Exercises/Interventions:   Therapeutic Exercise (60618) Resistance / level Sets/sec Reps Notes   UBE  retro  2'     Ball on wall - cx  Chin nod  TB B ER  TB pull apart  TB diagonals     3#  Orange  orange          10  20  20  20                  Doorway pect str  30\" 2                                Therapeutic Activities (29408)       Wall pushup   15    CC: Mid row  High row   17.5#  22#   3  3   10  10                                NMR re-education (84762)                                          Manual Intervention (30424)       Cerv mobs   4'    SOR   2'    Manual Cx traction   5'           STM with/without hawkgrips   8' PT SEATED   K-tape to R LS 2 I strips in a X pattern          Patient education:  -pt educated on diagnosis, prognosis and expectations for rehab  -all pt questions were answered    Home exercise program:   10/1: doorway str    Access Code: [email or web address]   URL: [email or web address]   Date: 09/28/2020   Prepared by: Dain Merlin     Exercises   Standing Cervical Sidebending AROM - 3 reps - 20 hold - 1x daily - 7x weekly   Cervical AROM Flexion and Rotation - 3 reps - 20 hold - 1x daily - 7x weekly   Seated Cervical Retraction - 10 reps - 5 hold - 1x daily - 7x weekly     Therapeutic Exercise and NMR EXR  [x] (75016) Provided verbal/tactile cueing for activities related to strengthening, flexibility, endurance, ROM  for improvements in cervical, postural, scapular, scapulothoracic and UE control with self care, reaching, carrying, lifting, house/yardwork, driving/computer work.    [] (56720) Provided verbal/tactile cueing for activities related to improving balance, coordination, kinesthetic sense, posture, motor skill, proprioception  to assist with cervical, scapular, scapulothoracic and UE control with self care, reaching, carrying, lifting, house/yardwork, driving/computer work.    Therapeutic Activities:    [] (56165 or 37051) Provided verbal/tactile cueing for activities related to improving balance, coordination, kinesthetic sense, posture, motor skill, proprioception and motor activation to allow for proper function of cervical, scapular, scapulothoracic and UE control with self care, carrying, lifting, driving/computer work.      Home Exercise Program:    [x] (63416) Reviewed/Progressed HEP activities related to strengthening, flexibility, endurance, ROM of cervical, scapular, scapulothoracic and UE control with self care, reaching, carrying, lifting, house/yardwork, driving/computer work  [] (54189) Reviewed/Progressed HEP activities related to improving balance, coordination, kinesthetic sense, posture, motor skill, proprioception of cervical, scapular, scapulothoracic and UE control with self care, reaching, carrying, lifting, house/yardwork, driving/computer work      Manual Treatments:  PROM / STM / Oscillations-Mobs:  G-I, II, III, IV (PA's, Inf., Post.)  [x] (22207) Provided manual therapy to mobilize soft tissue/joints of cervical/CT, scapular GHJ and UE for the purpose of decreasing headache, modulating pain, promoting relaxation,  increasing ROM, reducing/eliminating soft tissue swelling/inflammation/restriction, improving soft tissue extensibility and allowing for proper ROM for normal function with self care, reaching, carrying, lifting, house/yardwork, driving/computer work    Modalities:  10/8: US @100% 1.5w/cm x 8min to R cx with biofreeze , K-tape applied after manual  Charges:  Timed Code Treatment Minutes: 45   Total Treatment Minutes: 45       [] EVAL - LOW (41834)   [] EVAL - MOD (05363)  [] EVAL - HIGH (48149)  [] RE-EVAL (14889)  [x] TE (07948) x  1    [] Ionto (67377)  [] NMR (98230) x      [] Vaso (59449)  [x] Manual (10086) x  1   [x] Ultrasound  [] TA (02087) x      [] Mech Traction (84093)  [] Gait Training x     [] ES (un) (10214):   [] Aquatic therapy x   [] Other:   [] Group:       GOALS:  Patient stated goal: no more pain  [x]? Progressing: []? Met: []? Not Met: []? Adjusted     Therapist goals for Patient:   Short Term Goals: To be achieved in: 2 weeks  1. Independent in HEP and progression per patient tolerance, in order to prevent re-injury. [x]? Progressing: []? Met: []? Not Met: []? Adjusted  2. Patient will have a decrease in pain to facilitate improvement in movement, function, and ADLs as indicated by Functional Deficits. [x]? Progressing: []? Met: []? Not Met: []? Adjusted     Long Term Goals: To be achieved in: 4-6 weeks  1. Disability index score of 10% or less for the NDI to assist with reaching prior level of function. [x]? Progressing: []? Met: []? Not Met: []? Adjusted  2. Patient will demonstrate increased AROM to [facility] of cervical/thoracic spine to allow for proper joint functioning as indicated by patients Functional Deficits. [x]? Progressing: []? Met: []? Not Met: []? Adjusted  3. Patient will demonstrate an increase in postural awareness and control and activation of  Deep cervical stabilizers to allow for proper functional mobility as indicated by patients Functional Deficits. [x]? Progressing: []? Met: []? Not Met: []? Adjusted  4. Patient will return to functional activities including sleeping and riding his motorcycle without increased symptoms or restriction. [x]? Progressing: []? Met: []? Not Met: []? Adjusted  5. Patient will have minimal mm guarding in CPVM  [x]? Progressing: []? Met: []? Not Met: []? Adjusted         Overall Progression Towards Functional goals/ Treatment Progress Update:  [] Patient is progressing as expected towards functional goals listed. [] Progression is slowed due to complexities/Impairments listed. [] Progression has been slowed due to co-morbidities.   [x] Plan just implemented, too soon to assess goals progression <30days   [] Goals require adjustment due to lack of progress  [] Patient is not progressing as expected and requires additional follow up with physician  [] Other    Persisting Functional Limitations/Impairments:  []Sitting []Standing   []Walking []Squatting/bending    []Stairs []ADL's    []Transfers []Reaching  []Housework [x]Lifting  []Driving []Job related tasks  [x]Sports/Recreation  []Sleeping  []Other:    ASSESSMENT: Pt tolerated postural strengthening. He notes less pain but popping continues. Cont manual tx with relief. Added US and K-tape to tx today to address LS trigger point. Treatment/Activity Tolerance:  [x] Patient able to complete tx  [] Patient limited by fatique  [] Patient limited by pain  [] Patient limited by other medical complications  [] Other:     Prognosis: [x] Good [] Fair  [] Poor    Patient Requires Follow-up: [x] Yes  [] No    Plan for next treatment session:    PLAN: See eval. PT 2x / week for 4-6 weeks. [x] Continue per plan of care [] Alter current plan (see comments)  [] Plan of care initiated [] Hold pending MD visit [] Discharge    Electronically signed by: Shannan Chan, PT MPT [id number]      Note: If patient does not return for scheduled/ recommended follow up visits, this note will serve as a discharge from care along with most recent update on progress.

## 2020-10-12 ENCOUNTER — OFFICE VISIT (OUTPATIENT)
Dept: FAMILY MEDICINE CLINIC | Age: 65
End: 2020-10-12
Payer: COMMERCIAL

## 2020-10-12 VITALS
BODY MASS INDEX: 25.94 KG/M2 | TEMPERATURE: 98.2 F | OXYGEN SATURATION: 97 % | DIASTOLIC BLOOD PRESSURE: 74 MMHG | RESPIRATION RATE: 12 BRPM | WEIGHT: 186 LBS | SYSTOLIC BLOOD PRESSURE: 130 MMHG | HEART RATE: 85 BPM

## 2020-10-12 PROBLEM — M50.30 DDD (DEGENERATIVE DISC DISEASE), CERVICAL: Status: ACTIVE | Noted: 2020-10-12

## 2020-10-12 PROCEDURE — 99397 PER PM REEVAL EST PAT 65+ YR: CPT | Performed by: FAMILY MEDICINE

## 2020-10-12 NOTE — PATIENT INSTRUCTIONS
Patient Education        Well Visit, Over 72: Care Instructions  Your Care Instructions     Physical exams can help you stay healthy. Your doctor has checked your overall health and may have suggested ways to take good care of yourself. He or she also may have recommended tests. At home, you can help prevent illness with healthy eating, regular exercise, and other steps. Follow-up care is a key part of your treatment and safety. Be sure to make and go to all appointments, and call your doctor if you are having problems. It's also a good idea to know your test results and keep a list of the medicines you take. How can you care for yourself at home? · Reach and stay at a healthy weight. This will lower your risk for many problems, such as obesity, diabetes, heart disease, and high blood pressure. · Get at least 30 minutes of exercise on most days of the week. Walking is a good choice. You also may want to do other activities, such as running, swimming, cycling, or playing tennis or team sports. · Do not smoke. Smoking can make health problems worse. If you need help quitting, talk to your doctor about stop-smoking programs and medicines. These can increase your chances of quitting for good. · Protect your skin from too much sun. When you're outdoors from 10 a.m. to 4 p.m., stay in the shade or cover up with clothing and a hat with a wide brim. Wear sunglasses that block UV rays. Even when it's cloudy, put broad-spectrum sunscreen (SPF 30 or higher) on any exposed skin. · See a dentist one or two times a year for checkups and to have your teeth cleaned. · Wear a seat belt in the car. Follow your doctor's advice about when to have certain tests. These tests can spot problems early. For men and women  · Cholesterol. Your doctor will tell you how often to have this done based on your overall health and other things that can increase your risk for heart attack and stroke. · Blood pressure.  Have your blood pressure checked during a routine doctor visit. Your doctor will tell you how often to check your blood pressure based on your age, your blood pressure results, and other factors. · Diabetes. Ask your doctor whether you should have tests for diabetes. · Vision. Experts recommend that you have yearly exams for glaucoma and other age-related eye problems. · Hearing. Tell your doctor if you notice any change in your hearing. You can have tests to find out how well you hear. · Colon cancer tests. Keep having colon cancer tests as your doctor recommends. You can have one of several types of tests. · Heart attack and stroke risk. At least every 4 to 6 years, you should have your risk for heart attack and stroke assessed. Your doctor uses factors such as your age, blood pressure, cholesterol, and whether you smoke or have diabetes to show what your risk for a heart attack or stroke is over the next 10 years. · Osteoporosis. Talk to your doctor about whether you should have a bone density test to find out whether you have thinning bones. Ask your doctor if you need to take a calcium plus vitamin D supplement. You may be able to get enough calcium and vitamin D through your diet. For women  · Pap test and pelvic exam. You may no longer need a Pap test. Talk with your doctor about whether to stop or continue to have Pap tests. · Breast exam and mammogram. Ask how often you should have a mammogram, which is an X-ray of your breasts. A mammogram can spot breast cancer before it can be felt and when it is easiest to treat. · Thyroid disease. Talk to your doctor about whether to have your thyroid checked as part of a regular physical exam. Women have an increased chance of a thyroid problem. For men  · Prostate exam. Talk to your doctor about whether you should have a blood test (called a PSA test) for prostate cancer.  Experts recommend that you discuss the benefits and risks of the test with your doctor before you decide whether to have this test. Some experts say that men ages 79 and older no longer need testing. · Abdominal aortic aneurysm. Ask your doctor whether you should have a test to check for an aneurysm. You may need a test if you ever smoked or if your parent, brother, sister, or child has had an aneurysm. When should you call for help? Watch closely for changes in your health, and be sure to contact your doctor if you have any problems or symptoms that concern you. Where can you learn more? Go to https://[employer]. org and sign in to your SmartAsset account. Enter K936 in the [facility] box to learn more about \"Well Visit, Over 65: Care Instructions. \"     If you do not have an account, please click on the \"Sign Up Now\" link. Current as of: May 27, 2020               Content Version: 12.6  © 2006-2020 [employer]. Care instructions adapted under license by [facility] ([facility]). If you have questions about a medical condition or this instruction, always ask your healthcare professional. [place] [address] any warranty or liability for your use of this information. Patient Education        Learning About Meal Planning for Diabetes  Why plan your meals? Meal planning can be a key part of managing diabetes. Planning meals and snacks with the right balance of carbohydrate, protein, and fat can help you keep your blood sugar at the target level you set with your doctor. You don't have to eat special foods. You can eat what your family eats, including sweets once in a while. But you do have to pay attention to how often you eat and how much you eat of certain foods. You may want to work with a dietitian or a certified diabetes educator. He or she can give you tips and meal ideas and can answer your questions about meal planning. This health professional can also help you reach a healthy weight if that is one of your goals.   What plan is right for you?  Your dietitian or diabetes educator may suggest that you start with the plate format or carbohydrate counting. The plate format  The plate format is a simple way to help you manage how you eat. You plan meals by learning how much space each food should take on a plate. Using the plate format helps you spread carbohydrate throughout the day. It can make it easier to keep your blood sugar level within your target range. It also helps you see if you're eating healthy portion sizes. To use the plate format, you put non-starchy vegetables on half your plate. Add meat or meat substitutes on one-quarter of the plate. Put a grain or starchy vegetable (such as brown rice or a potato) on the final quarter of the plate. You can add a small piece of fruit and some low-fat or fat-free milk or yogurt, depending on your carbohydrate goal for each meal.  Here are some tips for using the plate format:  · Make sure that you are not using an oversized plate. A 9-inch plate is best. Many restaurants use larger plates. · Get used to using the plate format at home. Then you can use it when you eat out. · Write down your questions about using the plate format. Talk to your doctor, a dietitian, or a diabetes educator about your concerns. Carbohydrate counting  With carbohydrate counting, you plan meals based on the amount of carbohydrate in each food. Carbohydrate raises blood sugar higher and more quickly than any other nutrient. It is found in desserts, breads and cereals, and fruit. It's also found in starchy vegetables such as potatoes and corn, grains such as rice and pasta, and milk and yogurt. Spreading carbohydrate throughout the day helps keep your blood sugar levels within your target range. Your daily amount depends on several things, including your weight, how active you are, which diabetes medicines you take, and what your goals are for your blood sugar levels.  A registered dietitian or diabetes educator can help you plan how much carbohydrate to include in each meal and snack. A guideline for your daily amount of carbohydrate is:  · 45 to 60 grams at each meal. That's about the same as 3 to 4 carbohydrate servings. · 15 to 20 grams at each snack. That's about the same as 1 carbohydrate serving. The Nutrition Facts label on packaged foods tells you how much carbohydrate is in a serving of the food. First, look at the serving size on the food label. Is that the amount you eat in a serving? All of the nutrition information on a food label is based on that serving size. So if you eat more or less than that, you'll need to adjust the other numbers. Total carbohydrate is the next thing you need to look for on the label. If you count carbohydrate servings, one serving of carbohydrate is 15 grams. For foods that don't come with labels, such as fresh fruits and vegetables, you'll need a guide that lists carbohydrate in these foods. Ask your doctor, dietitian, or diabetes educator about books or other nutrition guides you can use. If you take insulin, you need to know how many grams of carbohydrate are in a meal. This lets you know how much rapid-acting insulin to take before you eat. If you use an insulin pump, you get a constant rate of insulin during the day. So the pump must be programmed at meals to give you extra insulin to cover the rise in blood sugar after meals. When you know how much carbohydrate you will eat, you can take the right amount of insulin. Or, if you always use the same amount of insulin, you need to make sure that you eat the same amount of carbohydrate at meals. If you need more help to understand carbohydrate counting and food labels, ask your doctor, dietitian, or diabetes educator. How do you get started with meal planning? Here are some tips to get started:  · Plan your meals a week at a time. Don't forget to include snacks too. · Use cookbooks or online recipes to plan several main meals.  Plan some quick meals for busy nights. You also can double some recipes that freeze well. Then you can save half for other busy nights when you don't have time to cook. · Make sure you have the ingredients you need for your recipes. If you're running low on basic items, put these items on your shopping list too. · List foods that you use to make breakfasts, lunches, and snacks. List plenty of fruits and vegetables. · Post this list on the refrigerator. Add to it as you think of more things you need. · Take the list to the store to do your weekly shopping. Follow-up care is a key part of your treatment and safety. Be sure to make and go to all appointments, and call your doctor if you are having problems. It's also a good idea to know your test results and keep a list of the medicines you take. Where can you learn more? Go to https://SquareHubpesamanthayoumag.[employer]. org and sign in to your WhereverTV account. Enter K644 in the [employer] box to learn more about \"Learning About Meal Planning for Diabetes. \"     If you do not have an account, please click on the \"Sign Up Now\" link. Current as of: December 20, 2019               Content Version: 12.6  © 2538-1327 [employer]. Care instructions adapted under license by [facility] ([facility]). If you have questions about a medical condition or this instruction, always ask your healthcare professional. [place] [address] any warranty or liability for your use of this information.

## 2020-10-12 NOTE — PROGRESS NOTES
History and Physical      Lila Steel  YOB: 1955    Date of Service:  10/12/2020    Chief Complaint:   Lila Steel is a 72 y.o. male who  presents for physical examination. HPI: Patient with for physical examination review his chronic health runs. He feels the Ménière's disease is done extremely well as long as he is on the diuretic therapy. He apparently only took cluster medication for short time and wants to reevaluate his cholesterol status. He has changed his diet and feels he has been exercising more so now. He still have a lot of crepitus in his neck but is not having headaches. Stop taking anti-inflammatory medications. He is undergoing physical therapy. Patient not have laboratory assessment performed as of yet. MRI scan of the cervical spine demonstrated degenerative disc disease at multiple levels.     Wt Readings from Last 3 Encounters:   10/12/20 186 lb (84.4 kg)   09/11/20 184 lb (83.5 kg)   09/02/20 185 lb (83.9 kg)     BP Readings from Last 3 Encounters:   10/12/20 130/74   09/11/20 130/80   09/02/20 126/80       Patient Active Problem List   Diagnosis    Tinnitus    Meniere's disease, cochleovestibular, active    Encounter for monitoring diuretic therapy    Mixed hyperlipidemia       Allergies   Allergen Reactions    Seasonal      sneezing     Outpatient Medications Marked as Taking for the 10/12/20 encounter (Office Visit) with Gualberto Cook MD   Medication Sig Dispense Refill    triamterene-hydroCHLOROthiazide (DYAZIDE) 37.5-25 MG per capsule Take 1 capsule by mouth daily 90 capsule 3       Past Medical History:   Diagnosis Date    Hyperlipidemia     Impotence of organic origin     Meniere's disease     takes dyazide     Past Surgical History:   Procedure Laterality Date    BLEPHAROPLASTY Bilateral 2/14/2019    BILATERAL LOWER EYE BLEPHAROPLASTY performed by Aimee Wilburn MD at [address] Right     KNEE ARTHROSCOPY Left  OTHER SURGICAL HISTORY Right 11/04/2016    Right rotator cuff repair    NC LAP,INGUINAL HERNIA REPR,INITIAL Left 10/30/2018    ROBOT ASSISTED LAPAROSCOPIC LEFT INGUINAL HERNIA REPAIR WITH MESH AND OPEN UMBILICAL HERNIA REPAIR performed by Theresa Marin MD at [place] [address] Right     rotator cuff    VASECTOMY       Family History   Problem Relation Age of Onset    Cancer Mother         lymphoma    Heart Disease Father         valvular    Bleeding Prob Father         PUD     Social History     Socioeconomic History    Marital status:      Spouse name: Not on file    Number of children: Not on file    Years of education: Not on file    Highest education level: Not on file   Occupational History    Not on file   Social Needs    Financial resource strain: Not on file    Food insecurity     Worry: Not on file     Inability: Not on file    Transportation needs     Medical: Not on file     Non-medical: Not on file   Tobacco Use    Smoking status: Never Smoker    Smokeless tobacco: Never Used   Substance and Sexual Activity    Alcohol use: Yes     Comment: occ alcohol use/ weekends 3-4 drinks    Drug use: No    Sexual activity: Not on file   Lifestyle    Physical activity     Days per week: Not on file     Minutes per session: Not on file    Stress: Not on file   Relationships    Social connections     Talks on phone: Not on file     Gets together: Not on file     Attends Mandaen service: Not on file     Active member of club or organization: Not on file     Attends meetings of clubs or organizations: Not on file     Relationship status: Not on file    Intimate partner violence     Fear of current or ex partner: Not on file     Emotionally abused: Not on file     Physically abused: Not on file     Forced sexual activity: Not on file   Other Topics Concern    Not on file   Social History Narrative    Not on file       Self-testicular exams: Yes  Sexual activity: single partner, contraception - none   Last eye exam: 2018, normal  Exercise: aerobics 2 time(s) per week and weight training  2 time(s) per week    Review of Systems:  A comprehensive review of systems was negative except for what was noted in the HPI. Physical Exam:   Vitals:    10/12/20 0759   BP: 130/74   Site: Right Upper Arm   Position: Sitting   Cuff Size: Medium Adult   Pulse: 85   Resp: 12   Temp: 98.2 °F (36.8 °C)   TempSrc: Infrared   SpO2: 97%   Weight: 186 lb (84.4 kg)     Body mass index is 25.94 kg/m². Constitutional: He is oriented to person, place, and time. He appears well-developed and well-nourished. No distress. HEENT:   Head: Normocephalic and atraumatic. Right Ear: Tympanic membrane, external ear and ear canal normal.   Left Ear: Tympanic membrane, external ear and ear canal normal.   Mouth/Throat: Oropharynx is clear and moist and mucous membranes are normal. No oropharyngeal exudate or posterior oropharyngeal erythema. He has no cervical adenopathy. Eyes: Conjunctivae and extraocular motions are normal. Pupils are equal, round, and reactive to light. Neck:  Supple. No JVD present. Carotid bruit is not present. No mass and no thyromegaly present. Cardiovascular: Normal rate, regular rhythm, normal heart sounds and intact distal pulses. Exam reveals no gallop and no friction rub. No murmur heard. Pulmonary/Chest: Effort normal and breath sounds normal. No respiratory distress. He has no wheezes, rhonchi or rales. Abdominal: Soft, non-tender. Bowel sounds and aorta are normal. There is no organomegaly, mass or bruit. Genitourinary:  genitals normal without hernia or inguinal adenopathy. Musculoskeletal: Normal range of motion, no synovitis. He exhibits no edema. Cervical spine with no point tenderness. Does have some muscular tightness in the lower portion of the neck and down into the trapezial ridge. Full range of motion.   Neurological: He is alert and oriented to person, Tracey Davis M.D.

## 2020-10-13 ENCOUNTER — HOSPITAL ENCOUNTER (OUTPATIENT)
Dept: PHYSICAL THERAPY | Age: 65
Setting detail: THERAPIES SERIES
Discharge: HOME | End: 2020-10-13
Payer: COMMERCIAL

## 2020-10-13 PROCEDURE — 97110 THERAPEUTIC EXERCISES: CPT | Performed by: PHYSICAL THERAPIST

## 2020-10-13 PROCEDURE — 97140 MANUAL THERAPY 1/> REGIONS: CPT | Performed by: PHYSICAL THERAPIST

## 2020-10-13 PROCEDURE — 97530 THERAPEUTIC ACTIVITIES: CPT | Performed by: PHYSICAL THERAPIST

## 2020-10-13 NOTE — FLOWSHEET NOTE
Gideon Aguilar  Phone: [phone]  Fax: [phone]    Physical Therapy Treatment Note/ Progress Report:     Date:  10/13/2020    Patient Name:  Shante Romano :  1955  MRN: [id number]  Restrictions/Precautions:    Medical/Treatment Diagnosis Information:  Diagnosis: M47.22  Cx OA with radiculopathy  Treatment Diagnosis: Cx strain with pain and mm gaurding  Insurance/Certification information:  PT Insurance Information: [employer] Joseph  Physician Information:  Referring Practitioner: Curt Madera MD  Plan of care signed (Y/N): []  Yes  [x]  No     Date of Patient follow up with Physician:      Progress Report: []  Yes  [x]  No     Date Range for reporting period:  Beginnin20  Ending:     Progress report due (10 Rx/or 30 days whichever is less):    Recertification due (POC duration/ or 90 days whichever is less):    Visit # Insurance Allowable Auth required? Date Range   20 total  eval + auth x 5 []  Yes  []  No    -     Latex Allergy:  [x]NO      []YES  Preferred Language for Healthcare:   [x]English       []other:    Functional Scale:        Date assessed:  NDI: raw score = 6; dysfunction = 12%   20    Pain level:  3/10     History:  Patient stated complaint: Pt reports onset of R sided Cx pain about 1 month ago. He had stopped going to the gym since quarantine and feels that co-insided with pain. He gets popping in the neck ml with cx flexion. Meds have not touched the pain. Pt was referred to PT by his PCP. SUBJECTIVE:  Mainly no pain, but pop and cracking continues. Pt saw MD and he said that may continue, was happy with change in pain. Pt is frustrated with this popping continuing. He will resume exercise athome similar to what he did at the gym.      OBJECTIVE: See eval   Observation: trigger point in R LS   Test measurements:      RESTRICTIONS/PRECAUTIONS:     Exercises/Interventions: Therapeutic Exercise (27222) Resistance / level Sets/sec Reps Notes   UBE  retro  3'     Ball on wall - cx  Chin nod      TB pull apart  TB diagonals         Orange  orange                20  20           Free wts:  scaption  B ER   5#  5#    10 B  20 B    Doorway pect str  30\" 2                                Therapeutic Activities (86155)       Wall pushup   20    CC: Mid row  High row  Low Row   17.5#  25#  25#   2  2  2   15  15  15                                NMR re-education (03479)                                          Manual Intervention (73003)       Cerv mobs   4' PA and side glides   SOR       Manual Cx traction   5'           STM with/without hawkgrips   8' PT SEATED - onset a mild HA with STM to UT near insertion   K-tape to R LS 2 I strips in a X pattern          Patient education:  -pt educated on diagnosis, prognosis and expectations for rehab  -all pt questions were answered    Home exercise program:   10/1: doorway str    Access Code: [email or web address]   URL: [email or web address]   Date: 09/28/2020   Prepared by: Luis Carlos Standing     Exercises   Standing Cervical Sidebending AROM - 3 reps - 20 hold - 1x daily - 7x weekly   Cervical AROM Flexion and Rotation - 3 reps - 20 hold - 1x daily - 7x weekly   Seated Cervical Retraction - 10 reps - 5 hold - 1x daily - 7x weekly     Therapeutic Exercise and NMR EXR  [x] (07521) Provided verbal/tactile cueing for activities related to strengthening, flexibility, endurance, ROM  for improvements in cervical, postural, scapular, scapulothoracic and UE control with self care, reaching, carrying, lifting, house/yardwork, driving/computer work.    [] (20295) Provided verbal/tactile cueing for activities related to improving balance, coordination, kinesthetic sense, posture, motor skill, proprioception  to assist with cervical, scapular, scapulothoracic and UE control with self care, reaching, carrying, lifting, house/yardwork, driving/computer work.    Therapeutic Activities:    [] (37896 or 01991) Provided verbal/tactile cueing for activities related to improving balance, coordination, kinesthetic sense, posture, motor skill, proprioception and motor activation to allow for proper function of cervical, scapular, scapulothoracic and UE control with self care, carrying, lifting, driving/computer work. Home Exercise Program:    [x] (81249) Reviewed/Progressed HEP activities related to strengthening, flexibility, endurance, ROM of cervical, scapular, scapulothoracic and UE control with self care, reaching, carrying, lifting, house/yardwork, driving/computer work  [] (20113) Reviewed/Progressed HEP activities related to improving balance, coordination, kinesthetic sense, posture, motor skill, proprioception of cervical, scapular, scapulothoracic and UE control with self care, reaching, carrying, lifting, house/yardwork, driving/computer work      Manual Treatments:  PROM / STM / Oscillations-Mobs:  G-I, II, III, IV (PA's, Inf., Post.)  [x] (33470) Provided manual therapy to mobilize soft tissue/joints of cervical/CT, scapular GHJ and UE for the purpose of decreasing headache, modulating pain, promoting relaxation,  increasing ROM, reducing/eliminating soft tissue swelling/inflammation/restriction, improving soft tissue extensibility and allowing for proper ROM for normal function with self care, reaching, carrying, lifting, house/yardwork, driving/computer work    Modalities:    Charges:  Timed Code Treatment Minutes: 45   Total Treatment Minutes: 45       [] EVAL - LOW (55009)   [] EVAL - MOD (91467)  [] EVAL - HIGH (05479)  [] RE-EVAL (67764)  [x] TE (75908) x  1    [] Ionto (27479)  [] NMR (84554) x      [] Vaso (75914)  [x] Manual (70259) x  1   [] Ultrasound  [x] TA (89273) x      [] Mech Traction (61027)  [] Gait Training x     [] ES (un) (81027):   [] Aquatic therapy x   [] Other:   [] Group:       GOALS:  Patient stated goal: no more pain  [x]? Progressing: []? Met: []? Not Met: []? Adjusted     Therapist goals for Patient:   Short Term Goals: To be achieved in: 2 weeks  1. Independent in HEP and progression per patient tolerance, in order to prevent re-injury. [x]? Progressing: []? Met: []? Not Met: []? Adjusted  2. Patient will have a decrease in pain to facilitate improvement in movement, function, and ADLs as indicated by Functional Deficits. [x]? Progressing: []? Met: []? Not Met: []? Adjusted     Long Term Goals: To be achieved in: 4-6 weeks  1. Disability index score of 10% or less for the NDI to assist with reaching prior level of function. [x]? Progressing: []? Met: []? Not Met: []? Adjusted  2. Patient will demonstrate increased AROM to [facility] of cervical/thoracic spine to allow for proper joint functioning as indicated by patients Functional Deficits. [x]? Progressing: []? Met: []? Not Met: []? Adjusted  3. Patient will demonstrate an increase in postural awareness and control and activation of  Deep cervical stabilizers to allow for proper functional mobility as indicated by patients Functional Deficits. [x]? Progressing: []? Met: []? Not Met: []? Adjusted  4. Patient will return to functional activities including sleeping and riding his motorcycle without increased symptoms or restriction. [x]? Progressing: []? Met: []? Not Met: []? Adjusted  5. Patient will have minimal mm guarding in CPVM  [x]? Progressing: []? Met: []? Not Met: []? Adjusted         Overall Progression Towards Functional goals/ Treatment Progress Update:  [] Patient is progressing as expected towards functional goals listed. [] Progression is slowed due to complexities/Impairments listed. [] Progression has been slowed due to co-morbidities.   [x] Plan just implemented, too soon to assess goals progression <30days   [] Goals require adjustment due to lack of progress  [] Patient is not progressing as expected and requires additional follow up with physician  [] Other    Persisting Functional Limitations/Impairments:  []Sitting []Standing   []Walking []Squatting/bending    []Stairs []ADL's    []Transfers []Reaching  []Housework [x]Lifting  []Driving []Job related tasks  [x]Sports/Recreation  []Sleeping  []Other:    ASSESSMENT: Pt tolerated progressed scap/postural strengthening. He notes less pain but popping continues. Cont manual tx with relief. Treatment/Activity Tolerance:  [x] Patient able to complete tx  [] Patient limited by fatique  [] Patient limited by pain  [] Patient limited by other medical complications  [] Other:     Prognosis: [x] Good [] Fair  [] Poor    Patient Requires Follow-up: [x] Yes  [] No    Plan for next treatment session:    PLAN: See eval. PT 2x / week for 4-6 weeks. [x] Continue per plan of care [] Alter current plan (see comments)  [] Plan of care initiated [] Hold pending MD visit [] Discharge    Electronically signed by: Luis Carlos Haider, PT MPT [id number]      Note: If patient does not return for scheduled/ recommended follow up visits, this note will serve as a discharge from care along with most recent update on progress.

## 2020-10-15 ENCOUNTER — APPOINTMENT (OUTPATIENT)
Dept: PHYSICAL THERAPY | Age: 65
End: 2020-10-15
Payer: COMMERCIAL

## 2020-10-15 ENCOUNTER — TELEPHONE (OUTPATIENT)
Dept: FAMILY MEDICINE CLINIC | Age: 65
End: 2020-10-15

## 2020-10-15 NOTE — TELEPHONE ENCOUNTER
Patient would like an order for a COVID test. He states he may have been exposed to it.       Patients provider is out of office

## 2020-10-16 ENCOUNTER — OFFICE VISIT (OUTPATIENT)
Dept: PRIMARY CARE CLINIC | Age: 65
End: 2020-10-16
Payer: COMMERCIAL

## 2020-10-16 PROCEDURE — 99211 OFF/OP EST MAY X REQ PHY/QHP: CPT | Performed by: NURSE PRACTITIONER

## 2020-10-16 NOTE — PATIENT INSTRUCTIONS

## 2020-10-16 NOTE — PROGRESS NOTES
Seema Evans received a viral test for COVID-19. They were educated on isolation and quarantine as appropriate. For any symptoms, they were directed to seek care from their PCP, given contact information to establish with a doctor, directed to an urgent care or the emergency room.

## 2020-10-17 LAB — SARS-COV-2, NAA: NOT DETECTED

## 2020-10-18 NOTE — RESULT ENCOUNTER NOTE

## 2020-11-03 ENCOUNTER — HOSPITAL ENCOUNTER (OUTPATIENT)
Dept: PHYSICAL THERAPY | Age: 65
Setting detail: THERAPIES SERIES
Discharge: HOME | End: 2020-11-03
Payer: COMMERCIAL

## 2020-11-03 PROCEDURE — 97110 THERAPEUTIC EXERCISES: CPT | Performed by: PHYSICAL THERAPIST

## 2020-11-03 PROCEDURE — 97140 MANUAL THERAPY 1/> REGIONS: CPT | Performed by: PHYSICAL THERAPIST

## 2020-11-03 PROCEDURE — 97035 APP MDLTY 1+ULTRASOUND EA 15: CPT | Performed by: PHYSICAL THERAPIST

## 2020-11-03 NOTE — FLOWSHEET NOTE
Gideon Aguilar  Phone: [phone]  Fax: [phone]    Physical Therapy Treatment Note/ Progress Report:     Date:  11/3/2020    Patient Name:  Shannan Cooper :  1955  MRN: [id number]  Restrictions/Precautions:    Medical/Treatment Diagnosis Information:  Diagnosis: M47.22  Cx OA with radiculopathy  Treatment Diagnosis: Cx strain with pain and mm gaurding  Insurance/Certification information:  PT Insurance Information: El[employer] Joseph  Physician Information:  Referring Practitioner: Jessica Pierce MD  Plan of care signed (Y/N): []  Yes  [x]  No     Date of Patient follow up with Physician:      Progress Report: []  Yes  [x]  No     Date Range for reporting period:  Beginnin20  Ending:     Progress report due (10 Rx/or 30 days whichever is less):    Recertification due (POC duration/ or 90 days whichever is less):    Visit # Insurance Allowable Auth required? Date Range    total  eval + auth x 5  auth for 2 V []  Yes  []  No    -11/27  11/3-     Latex Allergy:  [x]NO      []YES  Preferred Language for Healthcare:   [x]English       []other:    Functional Scale:        Date assessed:  NDI: raw score = 6; dysfunction = 12%   20    Pain level:  3/10     History:  Patient stated complaint: Pt reports onset of R sided Cx pain about 1 month ago. He had stopped going to the gym since quarantine and feels that co-insided with pain. He gets popping in the neck ml with cx flexion. Meds have not touched the pain. Pt was referred to PT by his PCP. SUBJECTIVE:  Pt was in 2 week quarantine after covid exposure. He continues to have R sided cx pain with mm tightness. If he has pain, there is no clicking, but vice versa. Patient woke up last Thursday with centralized LBP. He is using [address] daily. Overall he is sleeping better.      OBJECTIVE: 11/3:   Observation: trigger point in R LS   Test measurements:  11/3: verbal/tactile cueing for activities related to improving balance, coordination, kinesthetic sense, posture, motor skill, proprioception and motor activation to allow for proper function of cervical, scapular, scapulothoracic and UE control with self care, carrying, lifting, driving/computer work.      Home Exercise Program:    [x] (36516) Reviewed/Progressed HEP activities related to strengthening, flexibility, endurance, ROM of cervical, scapular, scapulothoracic and UE control with self care, reaching, carrying, lifting, house/yardwork, driving/computer work  [] (34841) Reviewed/Progressed HEP activities related to improving balance, coordination, kinesthetic sense, posture, motor skill, proprioception of cervical, scapular, scapulothoracic and UE control with self care, reaching, carrying, lifting, house/yardwork, driving/computer work      Manual Treatments:  PROM / STM / Oscillations-Mobs:  G-I, II, III, IV (PA's, Inf., Post.)  [x] (30684) Provided manual therapy to mobilize soft tissue/joints of cervical/CT, scapular GHJ and UE for the purpose of decreasing headache, modulating pain, promoting relaxation,  increasing ROM, reducing/eliminating soft tissue swelling/inflammation/restriction, improving soft tissue extensibility and allowing for proper ROM for normal function with self care, reaching, carrying, lifting, house/yardwork, driving/computer work    Modalities:  11/3: US @100% 1.5w/cm x 8 min to R cx, MH to cx x 10 min - pt supine with wedge  Charges:  Timed Code Treatment Minutes: 45   Total Treatment Minutes: 55       [] EVAL - LOW (28494)   [] EVAL - MOD (94824)  [] EVAL - HIGH (56332)  [] RE-EVAL (11620)  [x] TE [phone]) x  1    [] Ionto (08218)  [] NMR (83306) x      [] Vaso (38166)  [x] Manual (39374) x  1   [x] Ultrasound  [] TA (31819) x      [] Mech Traction (08825)  [] Gait Training x     [] ES (un) (96345):   [] Aquatic therapy x   [] Other:   [] Group:       GOALS:  Patient stated goal: no more pain  [x]? Progressing: []? Met: []? Not Met: []? Adjusted     Therapist goals for Patient:   Short Term Goals: To be achieved in: 2 weeks  1. Independent in HEP and progression per patient tolerance, in order to prevent re-injury. []? Progressing: [x]? Met: []? Not Met: []? Adjusted  2. Patient will have a decrease in pain to facilitate improvement in movement, function, and ADLs as indicated by Functional Deficits. []? Progressing: [x]? Met: []? Not Met: []? Adjusted     Long Term Goals: To be achieved in: 4-6 weeks  1. Disability index score of 10% or less for the NDI to assist with reaching prior level of function. [x]? Progressing: []? Met: []? Not Met: []? Adjusted  2. Patient will demonstrate increased AROM to [facility] of cervical/thoracic spine to allow for proper joint functioning as indicated by patients Functional Deficits. []? Progressing: [x]? Met: []? Not Met: []? Adjusted  3. Patient will demonstrate an increase in postural awareness and control and activation of  Deep cervical stabilizers to allow for proper functional mobility as indicated by patients Functional Deficits. [x]? Progressing: []? Met: []? Not Met: []? Adjusted  4. Patient will return to functional activities including sleeping and riding his motorcycle without increased symptoms or restriction. [x]? Progressing: []? Met: []? Not Met: []? Adjusted  5. Patient will have minimal mm guarding in CPVM  [x]? Progressing: []? Met: []? Not Met: []? Adjusted         Overall Progression Towards Functional goals/ Treatment Progress Update:  [] Patient is progressing as expected towards functional goals listed. [] Progression is slowed due to complexities/Impairments listed. [] Progression has been slowed due to co-morbidities.   [x] Plan just implemented, too soon to assess goals progression <30days   [] Goals require adjustment due to lack of progress  [] Patient is not progressing as expected and requires additional follow up with physician  [] Other    Persisting Functional Limitations/Impairments:  []Sitting []Standing   []Walking []Squatting/bending    []Stairs []ADL's    []Transfers []Reaching  []Housework [x]Lifting  []Driving []Job related tasks  [x]Sports/Recreation  []Sleeping  []Other:    ASSESSMENT: Patient has not been see in 2 weeks. He continues to have R cx tightness. Relief and reduced mm guarding with manual tx. Treatment/Activity Tolerance:  [x] Patient able to complete tx  [] Patient limited by fatique  [] Patient limited by pain  [] Patient limited by other medical complications  [] Other:     Prognosis: [x] Good [] Fair  [] Poor    Patient Requires Follow-up: [x] Yes  [] No    Plan for next treatment session:    PLAN: See eval. PT 2x / week for 4-6 weeks. [x] Continue per plan of care [] Alter current plan (see comments)  [] Plan of care initiated [] Hold pending MD visit [] Discharge    Electronically signed by: Flo Shaw PT MPT [id number]      Note: If patient does not return for scheduled/ recommended follow up visits, this note will serve as a discharge from care along with most recent update on progress.

## 2020-11-06 ENCOUNTER — TELEPHONE (OUTPATIENT)
Dept: FAMILY MEDICINE CLINIC | Age: 65
End: 2020-11-06

## 2020-11-06 LAB
ALBUMIN SERPL-MCNC: 4.2 G/DL (ref 3.5–5.7)
ALP BLD-CCNC: 59 U/L (ref 36–125)
ALT SERPL-CCNC: 21 U/L (ref 7–52)
ANION GAP SERPL CALCULATED.3IONS-SCNC: 9 MMOL/L (ref 3–16)
AST SERPL-CCNC: 19 U/L (ref 13–39)
BILIRUB SERPL-MCNC: 0.6 MG/DL (ref 0–1.5)
BUN BLDV-MCNC: 24 MG/DL (ref 7–25)
CALCIUM SERPL-MCNC: 9.7 MG/DL (ref 8.6–10.3)
CHLORIDE BLD-SCNC: 104 MMOL/L (ref 98–110)
CHOLESTEROL, TOTAL: 218 MG/DL (ref 0–200)
CO2: 31 MMOL/L (ref 21–33)
CREAT SERPL-MCNC: 1.02 MG/DL (ref 0.6–1.3)
GFR, ESTIMATED: 77 SEE NOTE.
GFR, ESTIMATED: 89 SEE NOTE.
GLUCOSE BLD-MCNC: 95 MG/DL (ref 70–100)
HDLC SERPL-MCNC: 40 MG/DL (ref 60–92)
HEPATITIS C ANTIBODY: NONREACTIVE
LDL CHOLESTEROL CALCULATED: ABNORMAL MG/DL
OSMOLALITY CALCULATION: 302 MOSM/KG (ref 278–305)
POTASSIUM SERPL-SCNC: 4.3 MMOL/L (ref 3.5–5.3)
PROSTATE SPECIFIC ANTIGEN: 3.36 NG/ML (ref 0–4)
SODIUM BLD-SCNC: 144 MMOL/L (ref 133–146)
TOTAL PROTEIN: 6.9 G/DL (ref 6.4–8.9)
TRIGL SERPL-MCNC: 413 MG/DL (ref 10–149)

## 2020-11-06 RX ORDER — ATORVASTATIN CALCIUM 40 MG/1
TABLET, FILM COATED ORAL
Qty: 90 TABLET | Status: CANCELLED | OUTPATIENT
Start: 2020-11-06

## 2020-11-06 RX ORDER — ATORVASTATIN CALCIUM 40 MG/1
TABLET, FILM COATED ORAL
Qty: 90 TABLET | Refills: 1 | Status: SHIPPED | OUTPATIENT
Start: 2020-11-06 | End: 2021-04-27

## 2020-11-06 NOTE — TELEPHONE ENCOUNTER
Patient wants to know if there is an actual medication he can take to lower his triglycerides also. He can try some fish oil, but he is wondering if there is a medication. Please advise.     Ok to send new Lipitor dosage to pharmacy below

## 2020-11-06 NOTE — TELEPHONE ENCOUNTER
----- Message from Sofia Molina MD sent at 11/6/2020  2:39 PM EST -----  Cholesterol continues to be over 200 and triglycerides are very high.   Would recommend increasing Lipitor to 40 mg daily and recheck laboratory profile in 3 to 4 months

## 2020-11-10 ENCOUNTER — HOSPITAL ENCOUNTER (OUTPATIENT)
Dept: PHYSICAL THERAPY | Age: 65
Setting detail: THERAPIES SERIES
Discharge: HOME | End: 2020-11-10
Payer: COMMERCIAL

## 2020-11-10 PROCEDURE — 97110 THERAPEUTIC EXERCISES: CPT | Performed by: PHYSICAL THERAPIST

## 2020-11-10 PROCEDURE — 97140 MANUAL THERAPY 1/> REGIONS: CPT | Performed by: PHYSICAL THERAPIST

## 2020-11-10 PROCEDURE — 97035 APP MDLTY 1+ULTRASOUND EA 15: CPT | Performed by: PHYSICAL THERAPIST

## 2020-11-10 NOTE — FLOWSHEET NOTE
Gideon Aguilar  Phone: [phone]  Fax: [phone]    Physical Therapy Treatment Note/ Progress Report:     Date:  11/10/2020    Patient Name:  Vanessa Landrum :  1955  MRN: [id number]  Restrictions/Precautions:    Medical/Treatment Diagnosis Information:  Diagnosis: M47.22  Cx OA with radiculopathy  Treatment Diagnosis: Cx strain with pain and mm gaurding  Insurance/Certification information:  PT Insurance Information: El[employer] Joseph  Physician Information:  Referring Practitioner: Neela Polanco MD  Plan of care signed (Y/N): []  Yes  [x]  No     Date of Patient follow up with Physician:      Progress Report: []  Yes  [x]  No     Date Range for reporting period:  Beginnin20  Endin/10/20    Progress report due (10 Rx/or 30 days whichever is less):    Recertification due (POC duration/ or 90 days whichever is less):    Visit # Insurance Allowable Auth required? Date Range    total  eval + auth x 5  auth for 2 V []  Yes  []  No    -11/27  11/3-     Latex Allergy:  [x]NO      [x]YES  Preferred Language for Healthcare:   [x]English       []other:    Functional Scale:        Date assessed:  NDI: raw score = 6; dysfunction = 12%   20    Pain level:  3/10     History:  Patient stated complaint: Pt reports onset of R sided Cx pain about 1 month ago. He had stopped going to the gym since quarantine and feels that co-insided with pain. He gets popping in the neck ml with cx flexion. Meds have not touched the pain. Pt was referred to PT by his PCP. SUBJECTIVE:  Pt reports increased R UT/LS pain with spasms at night when he gets up to use bathroom. Exercises seem to make it worse. Pt is frustrated this pain continues. PT encouraged pt to call MD and get spinal specialist consult.      OBJECTIVE:   Observation: trigger point in R LS   Test measurements:  11/3: Cx AROM: flex 30, ext 40, SB B 50, rotation L 80 R 70    RESTRICTIONS/PRECAUTIONS:     Exercises/Interventions:   Therapeutic Exercise (32758) Resistance / level Sets/sec Reps Notes   UBE  retro  2'     Ball on wall - cx  Chin nod      TB pull apart  TB diagonals         Orange  orange                20  20              Doorway pect str  30\" 2                                Therapeutic Activities (72619)          CC: Mid row  High row  Low Row   17.5#  25#  25#   2  2  2   15  15  15                                NMR re-education (06935)                                          Manual Intervention (41534)       Cerv mobs   4' PA and side glides   SOR       Manual Cx traction In cx flexion  5'    CT manip on R side   2x Pt prone with L cx rotation   STM with/without hawkgrips   8' PT prone today             Patient education:  -pt educated on diagnosis, prognosis and expectations for rehab  -all pt questions were answered    Home exercise program:   10/1: doorway str    Access Code: [email or web address]   URL: [email or web address]   Date: 09/28/2020   Prepared by: Nany Alcala     Exercises   Standing Cervical Sidebending AROM - 3 reps - 20 hold - 1x daily - 7x weekly   Cervical AROM Flexion and Rotation - 3 reps - 20 hold - 1x daily - 7x weekly   Seated Cervical Retraction - 10 reps - 5 hold - 1x daily - 7x weekly     Therapeutic Exercise and NMR EXR  [x] (80574) Provided verbal/tactile cueing for activities related to strengthening, flexibility, endurance, ROM  for improvements in cervical, postural, scapular, scapulothoracic and UE control with self care, reaching, carrying, lifting, house/yardwork, driving/computer work.    [] (53681) Provided verbal/tactile cueing for activities related to improving balance, coordination, kinesthetic sense, posture, motor skill, proprioception  to assist with cervical, scapular, scapulothoracic and UE control with self care, reaching, carrying, lifting, house/yardwork, driving/computer work.     Therapeutic Activities:    [] (77709 or 14241) Provided verbal/tactile cueing for activities related to improving balance, coordination, kinesthetic sense, posture, motor skill, proprioception and motor activation to allow for proper function of cervical, scapular, scapulothoracic and UE control with self care, carrying, lifting, driving/computer work.      Home Exercise Program:    [x] (36358) Reviewed/Progressed HEP activities related to strengthening, flexibility, endurance, ROM of cervical, scapular, scapulothoracic and UE control with self care, reaching, carrying, lifting, house/yardwork, driving/computer work  [] (89819) Reviewed/Progressed HEP activities related to improving balance, coordination, kinesthetic sense, posture, motor skill, proprioception of cervical, scapular, scapulothoracic and UE control with self care, reaching, carrying, lifting, house/yardwork, driving/computer work      Manual Treatments:  PROM / STM / Oscillations-Mobs:  G-I, II, III, IV (PA's, Inf., Post.)  [x] (85701) Provided manual therapy to mobilize soft tissue/joints of cervical/CT, scapular GHJ and UE for the purpose of decreasing headache, modulating pain, promoting relaxation,  increasing ROM, reducing/eliminating soft tissue swelling/inflammation/restriction, improving soft tissue extensibility and allowing for proper ROM for normal function with self care, reaching, carrying, lifting, house/yardwork, driving/computer work    Modalities:  11/10: US @100% 1.5w/cm x 8 min to R cx, MH to cx x 10 min - pt supine with wedge  Charges:  Timed Code Treatment Minutes: 45   Total Treatment Minutes: 55       [] EVAL - LOW (66707)   [] EVAL - MOD (06035)  [] EVAL - HIGH (56317)  [] RE-EVAL (05340)  [x] TE (81477) x  1    [] Ionto (69796)  [] NMR (08763) x      [] Vaso (98832)  [x] Manual (46088) x  1   [x] Ultrasound  [] TA (11769) x      [] Mech Traction (47631)  [] Gait Training x     [] ES (un) (86691):   [] Aquatic therapy x   [] Other:   [] expected and requires additional follow up with physician  [] Other    Persisting Functional Limitations/Impairments:  []Sitting []Standing   []Walking []Squatting/bending    []Stairs []ADL's    []Transfers []Reaching  []Housework [x]Lifting  []Driving []Job related tasks  [x]Sports/Recreation  []Sleeping  []Other:    ASSESSMENT: Patient was not been see in 2 weeks. He continues to have R cx tightness with a moderate R levator scap trigger point. Popping and pain continue in the neck. I feel patient would benefit from consult with spinal specialist at this time. He may benefit from a mm injection &/or epidural.    Treatment/Activity Tolerance:  [x] Patient able to complete tx  [] Patient limited by fatique  [] Patient limited by pain  [] Patient limited by other medical complications  [] Other:     Prognosis: [x] Good [] Fair  [] Poor    Patient Requires Follow-up: [x] Yes  [] No    Plan for next treatment session:    PLAN: See eval. PT 2x / week for 4-6 weeks. [x] Continue per plan of care [] Alter current plan (see comments)  [] Plan of care initiated [] Hold pending MD visit [] Discharge    Electronically signed by: Claudene Dixon, PT MPT [id number]      Note: If patient does not return for scheduled/ recommended follow up visits, this note will serve as a discharge from care along with most recent update on progress.

## 2020-11-12 ENCOUNTER — HOSPITAL ENCOUNTER (OUTPATIENT)
Dept: PHYSICAL THERAPY | Age: 65
Setting detail: THERAPIES SERIES
Discharge: HOME | End: 2020-11-12
Payer: COMMERCIAL

## 2020-11-12 PROCEDURE — 97110 THERAPEUTIC EXERCISES: CPT | Performed by: PHYSICAL THERAPIST

## 2020-11-12 PROCEDURE — 97140 MANUAL THERAPY 1/> REGIONS: CPT | Performed by: PHYSICAL THERAPIST

## 2020-11-12 PROCEDURE — 97530 THERAPEUTIC ACTIVITIES: CPT | Performed by: PHYSICAL THERAPIST

## 2020-11-12 NOTE — FLOWSHEET NOTE
Gideon Aguilar  Phone: [phone]  Fax: [phone]    Physical Therapy Treatment Note/ Progress Report:     Date:  2020    Patient Name:  Chanell Mcelroy :  1955  MRN: [id number]  Restrictions/Precautions:    Medical/Treatment Diagnosis Information:  Diagnosis: M47.22  Cx OA with radiculopathy  Treatment Diagnosis: Cx strain with pain and mm gaurding  Insurance/Certification information:  PT Insurance Information: [employer]  Physician Information:  Referring Practitioner: Derik Fan MD  Plan of care signed (Y/N): [x]  Yes  []  No     Date of Patient follow up with Physician:      Progress Report: []  Yes  [x]  No     Date Range for reporting period:  Beginnin20  Endin/10/20    Progress report due (10 Rx/or 30 days whichever is less):    Recertification due (POC duration/ or 90 days whichever is less):    Visit # Insurance Allowable Auth required? Date Range    total  eval + auth x 5  auth for 2 V []  Yes  []  No    -11/27  11/3-     Latex Allergy:  [x]NO      [x]YES  Preferred Language for Healthcare:   [x]English       []other:    Functional Scale:        Date assessed:  NDI: raw score = 6; dysfunction = 12%   20  NDI: raw score = 7; dysfunction = 14%   20        Pain level:  3/10     History:  Patient stated complaint: Pt reports onset of R sided Cx pain about 1 month ago. He had stopped going to the gym since quarantine and feels that co-insided with pain. He gets popping in the neck ml with cx flexion. Meds have not touched the pain. Pt was referred to PT by his PCP. SUBJECTIVE:  Pt reports some relief after last session. He was able to ride his motorcycle with no cx pain. PT still encouraged pt to call MD and get spinal specialist consult. Pt continues with R LS trigger point.      OBJECTIVE:   Observation: : moderate trigger point in R LS   Test measurements:  11/3: Cx AROM: flex 30, ext 40, SB B 50, rotation L 80 R 70    RESTRICTIONS/PRECAUTIONS:     Exercises/Interventions:   Therapeutic Exercise (08029) Resistance / level Sets/sec Reps Notes   UBE  retro  2'     Ball on wall - cx  Chin nod  cx rotation      TB pull apart  TB diagonals         Orange  orange          10  10 L/R      20  20     Sore coming out of R rotation             Doorway pect str  30\" 2                                Therapeutic Activities (42473)          CC: Mid row  High row  Low Row   17.5#  25#  25#   2  2  2   15  15  15                                NMR re-education (41552)                                          Manual Intervention (68921)       Cerv mobs   4' PA and side glides   SOR       Manual Cx traction In cx flexion  5'    CT manip on R side   2x Pt prone with L cx rotation   STM with/without hawkgrips   8' PT prone today             Patient education:  -pt educated on diagnosis, prognosis and expectations for rehab  -all pt questions were answered    Home exercise program:   10/1: doorway str    Access Code: [email or web address]   URL: [employer]/   Date: 09/28/2020   Prepared by: Cheri Jalloh     Exercises   Standing Cervical Sidebending AROM - 3 reps - 20 hold - 1x daily - 7x weekly   Cervical AROM Flexion and Rotation - 3 reps - 20 hold - 1x daily - 7x weekly   Seated Cervical Retraction - 10 reps - 5 hold - 1x daily - 7x weekly     Therapeutic Exercise and NMR EXR  [x] (97905) Provided verbal/tactile cueing for activities related to strengthening, flexibility, endurance, ROM  for improvements in cervical, postural, scapular, scapulothoracic and UE control with self care, reaching, carrying, lifting, house/yardwork, driving/computer work.    [] (17516) Provided verbal/tactile cueing for activities related to improving balance, coordination, kinesthetic sense, posture, motor skill, proprioception  to assist with cervical, scapular, scapulothoracic and UE control with self care, reaching, carrying, lifting, house/yardwork, driving/computer work. Therapeutic Activities:    [] (63878 or 68268) Provided verbal/tactile cueing for activities related to improving balance, coordination, kinesthetic sense, posture, motor skill, proprioception and motor activation to allow for proper function of cervical, scapular, scapulothoracic and UE control with self care, carrying, lifting, driving/computer work.      Home Exercise Program:    [x] (62846) Reviewed/Progressed HEP activities related to strengthening, flexibility, endurance, ROM of cervical, scapular, scapulothoracic and UE control with self care, reaching, carrying, lifting, house/yardwork, driving/computer work  [] (69913) Reviewed/Progressed HEP activities related to improving balance, coordination, kinesthetic sense, posture, motor skill, proprioception of cervical, scapular, scapulothoracic and UE control with self care, reaching, carrying, lifting, house/yardwork, driving/computer work      Manual Treatments:  PROM / STM / Oscillations-Mobs:  G-I, II, III, IV (PA's, Inf., Post.)  [x] (13925) Provided manual therapy to mobilize soft tissue/joints of cervical/CT, scapular GHJ and UE for the purpose of decreasing headache, modulating pain, promoting relaxation,  increasing ROM, reducing/eliminating soft tissue swelling/inflammation/restriction, improving soft tissue extensibility and allowing for proper ROM for normal function with self care, reaching, carrying, lifting, house/yardwork, driving/computer work    Modalities:  11/12:  MH to cx x 10 min - pt supine with wedge  Charges:  Timed Code Treatment Minutes: 45   Total Treatment Minutes: 55       [] EVAL - LOW (09358)   [] EVAL - MOD (43896)  [] EVAL - HIGH (46797)  [] RE-EVAL (20805)  [x] TE (25982) x  1    [] Ionto (22034)  [] NMR (82061) x      [] Vaso (45699)  [x] Manual (08533) x  1   [] Ultrasound  [x] TA (06008) x      [] Mech Traction (80270)  [] Gait Training x     []  () (23842):   [] Aquatic therapy x   [] Other:   [] Group:       GOALS:  Patient stated goal: no more pain  [x]? Progressing: []? Met: []? Not Met: []? Adjusted     Therapist goals for Patient:   Short Term Goals: To be achieved in: 2 weeks  1. Independent in HEP and progression per patient tolerance, in order to prevent re-injury. []? Progressing: [x]? Met: []? Not Met: []? Adjusted  2. Patient will have a decrease in pain to facilitate improvement in movement, function, and ADLs as indicated by Functional Deficits. []? Progressing: [x]? Met: []? Not Met: []? Adjusted     Long Term Goals: To be achieved in: 4-6 weeks  1. Disability index score of 10% or less for the NDI to assist with reaching prior level of function. []? Progressing: []? Met: [x]? Not Met: []? Adjusted  2. Patient will demonstrate increased AROM to [facility] of cervical/thoracic spine to allow for proper joint functioning as indicated by patients Functional Deficits. []? Progressing: [x]? Met: []? Not Met: []? Adjusted  3. Patient will demonstrate an increase in postural awareness and control and activation of  Deep cervical stabilizers to allow for proper functional mobility as indicated by patients Functional Deficits. []? Progressing: [x]? Met: []? Not Met: []? Adjusted  4. Patient will return to functional activities including sleeping and riding his motorcycle without increased symptoms or restriction. []? Progressing: [x]? Met: []? Not Met: []? Adjusted  5. Patient will have minimal mm guarding in CPVM  [x]? Progressing: []? Met: []? Not Met: []? Adjusted         Overall Progression Towards Functional goals/ Treatment Progress Update:  [] Patient is progressing as expected towards functional goals listed. [] Progression is slowed due to complexities/Impairments listed. [] Progression has been slowed due to co-morbidities.   [x] Plan just implemented, too soon to assess goals progression <30days   [] Goals require adjustment due to lack of progress  [] Patient is not progressing as expected and requires additional follow up with physician  [] Other    Persisting Functional Limitations/Impairments:  []Sitting []Standing   []Walking []Squatting/bending    []Stairs []ADL's    []Transfers []Reaching  []Housework [x]Lifting  []Driving []Job related tasks  [x]Sports/Recreation  []Sleeping  []Other:    ASSESSMENT:  He continues to have R cx tightness with a moderate R levator scap trigger point. Popping and pain continue in the neck. I feel patient would benefit from consult with spinal specialist at this time.  He may benefit from a mm injection &/or epidural.    Treatment/Activity Tolerance:  [x] Patient able to complete tx  [] Patient limited by fatique  [] Patient limited by pain  [] Patient limited by other medical complications  [] Other:     Prognosis: [x] Good [] Fair  [] Poor    Patient Requires Follow-up: [x] Yes  [] No    Plan for next treatment session:    PLAN: Continue with more insurance auth   [x] Continue per plan of care [] Alter current plan (see comments)  [] Plan of care initiated [] Hold pending MD visit [] Discharge    Electronically signed by: Lukas Clark, PT MPT [id number]

## 2020-11-16 ENCOUNTER — TELEPHONE (OUTPATIENT)
Dept: FAMILY MEDICINE CLINIC | Age: 65
End: 2020-11-16

## 2020-11-16 NOTE — TELEPHONE ENCOUNTER
----- Message from Fay Guerrero sent at 11/16/2020 10:59 AM EST -----  Subject: Message to Provider    QUESTIONS  Information for Provider? PT would like to know if a referral for his neck   pain can be sent to a neck and spine specialist   please call PT back and advise  ---------------------------------------------------------------------------  --------------  CALL BACK INFO  What is the best way for the office to contact you? OK to leave message on   voicemail  Preferred Call Back Phone Number? [phone]  ---------------------------------------------------------------------------  --------------  SCRIPT ANSWERS  Relationship to Patient?  Self

## 2020-11-17 ENCOUNTER — APPOINTMENT (OUTPATIENT)
Dept: PHYSICAL THERAPY | Age: 65
End: 2020-11-17
Payer: COMMERCIAL

## 2020-12-01 ENCOUNTER — TELEPHONE (OUTPATIENT)
Dept: FAMILY MEDICINE CLINIC | Age: 65
End: 2020-12-01

## 2020-12-01 NOTE — TELEPHONE ENCOUNTER
----- Message from Marni Max sent at 12/1/2020  4:19 PM EST -----  Subject: Referral Request    QUESTIONS   Reason for referral request? pt would like to be referred to a spine   specialist other than [address] [place] [address]. Pt said it has been two months and   they have cancelled multiple appointments on him and they are not going to   keep messing him around. He would like a different spine institute   Has the physician seen you for this condition before? Yes  Select a date? 2020-09-17  Select the physician (PCP or Specialist)? Marcella Stahl   Preferred Specialist (if applicable)? Do you already have an appointment scheduled? No  Additional Information for Provider? pt currently has neck and back pain   and [facility] has cancelled on him twice. Pt would like a referral to a   different specialist.  ---------------------------------------------------------------------------  --------------  [address]  What is the best way for the office to contact you? OK to leave message on   voicemail  Preferred Call Back Phone Number?  [phone]

## 2020-12-02 NOTE — TELEPHONE ENCOUNTER
Patient advised. Pt states he is having rectal bleeding and wants to see Dr. Iggy Gomez only. Pt put on Friday since this is an ongoing issue.

## 2020-12-04 ENCOUNTER — OFFICE VISIT (OUTPATIENT)
Dept: FAMILY MEDICINE CLINIC | Age: 65
End: 2020-12-04
Payer: COMMERCIAL

## 2020-12-04 VITALS
WEIGHT: 185.6 LBS | OXYGEN SATURATION: 98 % | BODY MASS INDEX: 25.89 KG/M2 | DIASTOLIC BLOOD PRESSURE: 80 MMHG | TEMPERATURE: 99.1 F | SYSTOLIC BLOOD PRESSURE: 134 MMHG | HEART RATE: 70 BPM

## 2020-12-04 PROCEDURE — 99214 OFFICE O/P EST MOD 30 MIN: CPT | Performed by: FAMILY MEDICINE

## 2020-12-04 RX ORDER — HYDROCORTISONE 25 MG/G
CREAM TOPICAL 2 TIMES DAILY
Qty: 1 TUBE | Refills: 0 | Status: SHIPPED | OUTPATIENT
Start: 2020-12-04 | End: 2022-05-02

## 2020-12-04 NOTE — PATIENT INSTRUCTIONS
Patient Education        Hemorrhoids: Care Instructions  Your Care Instructions     Hemorrhoids are enlarged veins that develop in the anal canal. Bleeding during bowel movements, itching, swelling, and rectal pain are the most common symptoms. They can be uncomfortable at times, but hemorrhoids rarely are a serious problem. You can treat most hemorrhoids with simple changes to your diet and bowel habits. These changes include eating more fiber and not straining to pass stools. Most hemorrhoids do not need surgery or other treatment unless they are very large and painful or bleed a lot. Follow-up care is a key part of your treatment and safety. Be sure to make and go to all appointments, and call your doctor if you are having problems. It's also a good idea to know your test results and keep a list of the medicines you take. How can you care for yourself at home? · Sit in a few inches of warm water (sitz bath) 3 times a day and after bowel movements. The warm water helps with pain and itching. · Put ice on your anal area several times a day for 10 minutes at a time. Put a thin cloth between the ice and your skin. Follow this by placing a warm, wet towel on the area for another 10 to 20 minutes. · Take pain medicines exactly as directed. ? If the doctor gave you a prescription medicine for pain, take it as prescribed. ? If you are not taking a prescription pain medicine, ask your doctor if you can take an over-the-counter medicine. · Keep the anal area clean, but be gentle. Use water and a fragrance-free soap, such as [place], or use baby wipes or medicated pads, such as Tucks. · Wear cotton underwear and loose clothing to decrease moisture in the anal area. · Eat more fiber. Include foods such as whole-grain breads and cereals, raw vegetables, raw and dried fruits, and beans. · Drink plenty of fluids, enough so that your urine is light yellow or clear like water.  If you have kidney, heart, or liver disease and have to limit fluids, talk with your doctor before you increase the amount of fluids you drink. · Use a stool softener that contains bran or psyllium. You can save money by buying bran or psyllium (available in bulk at most health food stores) and sprinkling it on foods or stirring it into fruit juice. Or you can use a product such as Metamucil or Hydrocil. · Practice healthy bowel habits. ? Go to the bathroom as soon as you have the urge. ? Avoid straining to pass stools. Relax and give yourself time to let things happen naturally. ? Do not hold your breath while passing stools. ? Do not read while sitting on the toilet. Get off the toilet as soon as you have finished. · Take your medicines exactly as prescribed. Call your doctor if you think you are having a problem with your medicine. When should you call for help? Call 911 anytime you think you may need emergency care. For example, call if:    · You pass maroon or very bloody stools. Call your doctor now or seek immediate medical care if:    · You have increased pain.     · You have increased bleeding. Watch closely for changes in your health, and be sure to contact your doctor if:    · Your symptoms have not improved after 3 or 4 days. Where can you learn more? Go to https://[employer]. org and sign in to your [employer] account. Enter B403 in the [facility] box to learn more about \"Hemorrhoids: Care Instructions. \"     If you do not have an account, please click on the \"Sign Up Now\" link. Current as of: April 15, 2020               Content Version: 12.6  © 8545-0950 [employer]. Care instructions adapted under license by [facility] ([facility]). If you have questions about a medical condition or this instruction, always ask your healthcare professional. [address] any warranty or liability for your use of this information.

## 2021-03-05 ENCOUNTER — IMMUNIZATION (OUTPATIENT)
Dept: PRIMARY CARE CLINIC | Age: 66
End: 2021-03-05
Payer: COMMERCIAL

## 2021-03-05 PROCEDURE — 0001A COVID-19, PFIZER VACCINE 30MCG/0.3ML DOSE: CPT | Performed by: FAMILY MEDICINE

## 2021-03-05 PROCEDURE — 91300 COVID-19, PFIZER VACCINE 30MCG/0.3ML DOSE: CPT | Performed by: FAMILY MEDICINE

## 2021-03-26 ENCOUNTER — IMMUNIZATION (OUTPATIENT)
Dept: PRIMARY CARE CLINIC | Age: 66
End: 2021-03-26
Payer: COMMERCIAL

## 2021-03-26 PROCEDURE — 0002A COVID-19, PFIZER VACCINE 30MCG/0.3ML DOSE: CPT | Performed by: FAMILY MEDICINE

## 2021-03-26 PROCEDURE — 91300 COVID-19, PFIZER VACCINE 30MCG/0.3ML DOSE: CPT | Performed by: FAMILY MEDICINE

## 2021-04-27 DIAGNOSIS — E78.00 ELEVATED CHOLESTEROL: ICD-10-CM

## 2021-04-27 RX ORDER — ATORVASTATIN CALCIUM 40 MG/1
TABLET, FILM COATED ORAL
Qty: 90 TABLET | Refills: 1 | Status: SHIPPED | OUTPATIENT
Start: 2021-04-27 | End: 2021-11-08

## 2021-06-08 ENCOUNTER — OFFICE VISIT (OUTPATIENT)
Dept: FAMILY MEDICINE CLINIC | Age: 66
End: 2021-06-08
Payer: COMMERCIAL

## 2021-06-08 VITALS
HEART RATE: 85 BPM | BODY MASS INDEX: 26.72 KG/M2 | HEIGHT: 69 IN | TEMPERATURE: 98 F | DIASTOLIC BLOOD PRESSURE: 60 MMHG | WEIGHT: 180.4 LBS | SYSTOLIC BLOOD PRESSURE: 102 MMHG | OXYGEN SATURATION: 97 %

## 2021-06-08 DIAGNOSIS — M15.9 PRIMARY OSTEOARTHRITIS INVOLVING MULTIPLE JOINTS: ICD-10-CM

## 2021-06-08 DIAGNOSIS — Z00.00 WELL ADULT EXAM: Primary | ICD-10-CM

## 2021-06-08 DIAGNOSIS — H81.09 MENIERE'S DISEASE, COCHLEOVESTIBULAR, ACTIVE, UNSPECIFIED LATERALITY: ICD-10-CM

## 2021-06-08 DIAGNOSIS — E78.2 MIXED HYPERLIPIDEMIA: ICD-10-CM

## 2021-06-08 DIAGNOSIS — Z12.5 SCREENING PSA (PROSTATE SPECIFIC ANTIGEN): ICD-10-CM

## 2021-06-08 PROBLEM — M15.0 PRIMARY OSTEOARTHRITIS INVOLVING MULTIPLE JOINTS: Status: ACTIVE | Noted: 2020-10-12

## 2021-06-08 PROCEDURE — 99214 OFFICE O/P EST MOD 30 MIN: CPT | Performed by: FAMILY MEDICINE

## 2021-06-08 RX ORDER — TRIAMTERENE AND HYDROCHLOROTHIAZIDE 37.5; 25 MG/1; MG/1
1 CAPSULE ORAL DAILY
Qty: 90 CAPSULE | Refills: 3 | Status: SHIPPED | OUTPATIENT
Start: 2021-06-08 | End: 2021-06-10 | Stop reason: SDUPTHER

## 2021-06-08 ASSESSMENT — PATIENT HEALTH QUESTIONNAIRE - PHQ9
SUM OF ALL RESPONSES TO PHQ9 QUESTIONS 1 & 2: 0
SUM OF ALL RESPONSES TO PHQ QUESTIONS 1-9: 0
1. LITTLE INTEREST OR PLEASURE IN DOING THINGS: 0
SUM OF ALL RESPONSES TO PHQ QUESTIONS 1-9: 0
SUM OF ALL RESPONSES TO PHQ QUESTIONS 1-9: 0
2. FEELING DOWN, DEPRESSED OR HOPELESS: 0

## 2021-06-08 NOTE — PROGRESS NOTES
History and Physical      Joaquin Corona  YOB: 1955    Date of Service:  6/8/2021    Chief Complaint:   Joaquin Corona is a 77 y.o. male who  presents for physical examination. HPI: Patient resents for physical examination review his chronic health problems. He feels he is doing extremely well in regards to the Ménière's disease as well as he takes his diuretic on a  daily basis. He does an appoint with ENT specialist in the future. He has been very conscientious of his diet and watching all the sugary items. He has been compliant also taking the cholesterol medication. He is curious about his triglyceride issues. His biggest complaint is that he still having some intermittent right head discomfort which has improved. He was evaluated by [facility] and went to physical therapy. He is also have a lot of back steps especially worse in the morning. He would like to go back to the gym and start working out.     Wt Readings from Last 3 Encounters:   06/08/21 180 lb 6.4 oz (81.8 kg)   12/04/20 185 lb 9.6 oz (84.2 kg)   10/12/20 186 lb (84.4 kg)     BP Readings from Last 3 Encounters:   06/08/21 102/60   12/04/20 134/80   10/12/20 130/74       Patient Active Problem List   Diagnosis    Tinnitus    Meniere's disease, cochleovestibular, active    Mixed hyperlipidemia    DDD (degenerative disc disease), cervical       Allergies   Allergen Reactions    Seasonal      sneezing     Outpatient Medications Marked as Taking for the 6/8/21 encounter (Office Visit) with Feliberto Mari MD   Medication Sig Dispense Refill    atorvastatin (LIPITOR) 40 MG tablet TAKE 1 TABLET BY MOUTH EVERY DAY 90 tablet 1    hydrocortisone (ANUSOL-HC) 2.5 % CREA rectal cream Place rectally 2 times daily (Patient taking differently: Place rectally 2 times daily as needed ) 1 Tube 0    sildenafil (VIAGRA) 100 MG tablet Take 1 tablet by mouth daily as needed for Erectile Dysfunction 10 tablet 5    triamterene-hydroCHLOROthiazide (DYAZIDE) 37.5-25 MG per capsule Take 1 capsule by mouth daily 90 capsule 3       Past Medical History:   Diagnosis Date    Hyperlipidemia     Impotence of organic origin     Meniere's disease     takes dyazide     Past Surgical History:   Procedure Laterality Date    BLEPHAROPLASTY Bilateral 2/14/2019    BILATERAL LOWER EYE BLEPHAROPLASTY performed by Jenn Mclaughlin MD at [address]     KNEE ARTHROSCOPY Left     OTHER SURGICAL HISTORY Right 11/04/2016    Right rotator cuff repair    [place] Claudine Haley [address] Left 10/30/2018    ROBOT ASSISTED LAPAROSCOPIC LEFT INGUINAL HERNIA REPAIR WITH MESH AND OPEN UMBILICAL HERNIA REPAIR performed by Destiney Abreu MD at [facility] [address] Right     rotator cuff    VASECTOMY       Family History   Problem Relation Age of Onset    Cancer Mother         lymphoma    Heart Disease Father         valvular    Bleeding Prob Father         PUD     Social History     Socioeconomic History    Marital status:      Spouse name: Not on file    Number of children: Not on file    Years of education: Not on file    Highest education level: Not on file   Occupational History    Not on file   Tobacco Use    Smoking status: Never Smoker    Smokeless tobacco: Never Used   Vaping Use    Vaping Use: Never used   Substance and Sexual Activity    Alcohol use: Yes     Comment: occ alcohol use/ weekends 3-4 drinks    Drug use: No    Sexual activity: Not on file   Other Topics Concern    Not on file   Social History Narrative    Not on file     Social Determinants of Health     Financial Resource Strain:     Difficulty of Paying Living Expenses:    Food Insecurity:     Worried About Running Out of Food in the Last Year:     Ran Out of Food in the Last Year:    Transportation Needs:     Lack of Transportation (Medical):      Lack of Transportation (Non-Medical):    Physical Activity:     Days of Exercise per Week:     Minutes of Exercise per Session:    Stress:     Feeling of Stress :    Social Connections:     Frequency of Communication with Friends and Family:     Frequency of Social Gatherings with Friends and Family:     Attends Congregation Services:     Active Member of Clubs or Organizations:     Attends Club or Organization Meetings:     Marital Status:    Intimate Partner Violence:     Fear of Current or Ex-Partner:     Emotionally Abused:     Physically Abused:     Sexually Abused:        Self-testicular exams: Yes  Sexual activity: single partner, contraception - none   Last eye exam: 2020, normal  Exercise: walks 3 time(s) per week    Review of Systems:  A comprehensive review of systems was negative except for what was noted in the HPI. Physical Exam:   Vitals:    06/08/21 1413   BP: 102/60   Site: Left Upper Arm   Position: Sitting   Cuff Size: Medium Adult   Pulse: 85   Temp: 98 °F (36.7 °C)   TempSrc: Infrared   SpO2: 97%   Weight: 180 lb 6.4 oz (81.8 kg)   Height: 5' 8.25\" (1.734 m)     Body mass index is 27.23 kg/m². Constitutional: He is oriented to person, place, and time. He appears well-developed and well-nourished. No distress. HEENT:   Head: Normocephalic and atraumatic. Right Ear: Tympanic membrane, external ear and ear canal normal.   Left Ear: Tympanic membrane, external ear and ear canal normal.   Mouth/Throat: Oropharynx is clear and moist and mucous membranes are normal. No oropharyngeal exudate or posterior oropharyngeal erythema. He has no cervical adenopathy. Eyes: Conjunctivae and extraocular motions are normal. Pupils are equal, round, and reactive to light. Neck:  Supple. No JVD present. Carotid bruit is not present. No mass and no thyromegaly present. Cardiovascular: Normal rate, regular rhythm, normal heart sounds and intact distal pulses. Exam reveals no gallop and no friction rub. No murmur heard.   Pulmonary/Chest: Effort normal and breath sounds normal. No respiratory distress. He has no wheezes, rhonchi or rales. Abdominal: Soft, non-tender. Bowel sounds and aorta are normal. There is no organomegaly, mass or bruit. Genitourinary:  genitals normal without hernia or inguinal adenopathy. Musculoskeletal: Normal range of motion, no synovitis. He exhibits no edema. Back demonstrates no spinous process tenderness and no paraspinal muscle tightness. He has tenderness right sacroiliac joint but he has full range of motion of his back and his neck. Neurological: He is alert and oriented to person, place, and time. He has normal reflexes. No cranial nerve deficit. Coordination normal.   Skin: Skin is warm, dry and intact. No suspicious lesions are noted. Psychiatric: He has a normal mood and affect.  His speech is normal and behavior is normal. Judgment, cognition and memory are normal.     Preventive Care:  Health Maintenance   Topic Date Due    Lipid screen  Never done    A1C test (Diabetic or Prediabetic)  08/24/2018    Colon cancer screen colonoscopy  02/12/2021    Shingles Vaccine (1 of 2) 10/12/2021 (Originally 2/10/2005)    Pneumococcal 65+ years Vaccine (1 of 1 - PPSV23) 10/12/2021 (Originally 2/10/2020)    Potassium monitoring  11/06/2021    Creatinine monitoring  11/06/2021    DTaP/Tdap/Td vaccine (3 - Td or Tdap) 09/15/2024    Flu vaccine  Completed    COVID-19 Vaccine  Completed    Hepatitis C screen  Completed    Hepatitis A vaccine  Aged Out    Hepatitis B vaccine  Aged Out    Hib vaccine  Aged Out    Meningococcal (ACWY) vaccine  Aged Out             Preventive plan of care for Shilpa Mcdonnell        6/8/2021           Preventive Measures Status       Recommendations for screening   Prostate Cancer Screen  Lab Results   Component Value Date    PSA 3.36 11/06/2020      Repeat yearly    Colon Cancer Screen  Last colonoscopy: 2016 Repeat in 5 years   Diabetes Screen  Glucose (mg/dL)   Date Value   11/06/2020 95 Repeat yearly   Cholesterol Screen  Lab Results   Component Value Date    CHOL 112 06/04/2021    TRIG 178 (H) 06/04/2021    HDL 42 (L) 06/04/2021    LDLCALC 34 06/04/2021    Screening not needed due to existing diagnosis   Aspirin for Cardiovascular Prevention   No Not indicated   Weight: Body mass index is 27.23 kg/m². 5' 8.25\" (1.734 m)180 lb 6.4 oz (81.8 kg)  Your BMI is 25 or greater, which indicates that you are overweight   Living Will: No Additional information provided    Recommended Immunizations    Immunization History   Administered Date(s) Administered    COVID-19, Pfizer, PF, 30mcg/0.3mL 03/05/2021, 03/26/2021    DT (pediatric) 10/28/1998    Influenza, MDCK Quadv, IM, PF (Flucelvax 4 yrs and older) 09/23/2020    Tdap (Boostrix, Adacel) 09/15/2014    Influenza vaccine:  recommended every fall         Other Recommendations ·   Try to get at least 30 minutes of exercise 3-4 days per week             Assessment/Plan:  Frankie Merino was seen today for annual exam.    Diagnoses and all orders for this visit:    Well adult exam  -     Comprehensive Metabolic Panel; Future  -     Lipid Panel; Future    Meniere's disease, cochleovestibular, active, unspecified laterality  -     triamterene-hydroCHLOROthiazide (DYAZIDE) 37.5-25 MG per capsule; Take 1 capsule by mouth daily    Mixed hyperlipidemia    Screening PSA (prostate specific antigen)  -     PSA screening; Future    Primary osteoarthritis involving multiple joints        Pt appears stable & reviewed labs with patient. Hyperlipidemia is much better controlled I recommend he continue with his current diet plan and medical management.     Continue with Dyazide for Ménière's disease    Discussed stretching exercises and moist heat for the back and neck area    Patient received counseling on the following healthy behaviors: nutrition and exercise     Patient given educational materials     Health maintenance updated    Discussed use, benefit, and side effects of

## 2021-06-08 NOTE — PATIENT INSTRUCTIONS
more stretch, put your other leg flat on the floor while pulling your knee to your chest.    Curl-ups   1. Lie on the floor on your back with your knees bent at a 90-degree angle. Your feet should be flat on the floor, about 12 inches from your buttocks. 2. Cross your arms over your chest. If this bothers your neck, try putting your hands behind your neck (not your head), with your elbows spread apart. 3. Slowly tighten your belly muscles and raise your shoulder blades off the floor. 4. Keep your head in line with your body, and do not press your chin to your chest.  5. Hold this position for 1 or 2 seconds, then slowly lower yourself back down to the floor. 6. Repeat 8 to 12 times. Pelvic tilt exercise   1. Lie on your back with your knees bent. 2. \"Brace\" your stomach. This means to tighten your muscles by pulling in and imagining your belly button moving toward your spine. You should feel like your back is pressing to the floor and your hips and pelvis are rocking back. 3. Hold for about 6 seconds while you breathe smoothly. 4. Repeat 8 to 12 times. Heel dig bridging   1. Lie on your back with both knees bent and your ankles bent so that only your heels are digging into the floor. Your knees should be bent about 90 degrees. 2. Then push your heels into the floor, squeeze your buttocks, and lift your hips off the floor until your shoulders, hips, and knees are all in a straight line. 3. Hold for about 6 seconds as you continue to breathe normally, and then slowly lower your hips back down to the floor and rest for up to 10 seconds. 4. Do 8 to 12 repetitions. Hamstring stretch in doorway   1. Lie on your back in a doorway, with one leg through the open door. 2. Slide your leg up the wall to straighten your knee. You should feel a gentle stretch down the back of your leg. 3. Hold the stretch for at least 15 to 30 seconds. Do not arch your back, point your toes, or bend either knee.  Keep one heel touching the floor and the other heel touching the wall. 4. Repeat with your other leg. 5. Do 2 to 4 times for each leg. Hip flexor stretch   1. Kneel on the floor with one knee bent and one leg behind you. Place your forward knee over your foot. Keep your other knee touching the floor. 2. Slowly push your hips forward until you feel a stretch in the upper thigh of your rear leg. 3. Hold the stretch for at least 15 to 30 seconds. Repeat with your other leg. 4. Do 2 to 4 times on each side. Wall sit   1. Stand with your back 10 to 12 inches away from a wall. 2. Lean into the wall until your back is flat against it. 3. Slowly slide down until your knees are slightly bent, pressing your lower back into the wall. 4. Hold for about 6 seconds, then slide back up the wall. 5. Repeat 8 to 12 times. Follow-up care is a key part of your treatment and safety. Be sure to make and go to all appointments, and call your doctor if you are having problems. It's also a good idea to know your test results and keep a list of the medicines you take. Where can you learn more? Go to [email or web address]://[email or web address]. org and sign in to your [employer] account. Enter B839 in the [employer] box to learn more about \"Low Back Pain: Exercises. \"     If you do not have an account, please click on the \"Sign Up Now\" link. Current as of: November 16, 2020               Content Version: 12.8  © 2006-2021 Healthwise, Incorporated. Care instructions adapted under license by [facility] [place] ([facility]). If you have questions about a medical condition or this instruction, always ask your healthcare professional. [place] [address] any warranty or liability for your use of this information.

## 2021-06-10 ENCOUNTER — OFFICE VISIT (OUTPATIENT)
Dept: ENT CLINIC | Age: 66
End: 2021-06-10
Payer: COMMERCIAL

## 2021-06-10 VITALS
TEMPERATURE: 97.2 F | DIASTOLIC BLOOD PRESSURE: 65 MMHG | BODY MASS INDEX: 26.39 KG/M2 | SYSTOLIC BLOOD PRESSURE: 118 MMHG | HEART RATE: 76 BPM | WEIGHT: 180 LBS

## 2021-06-10 DIAGNOSIS — J34.2 NASAL SEPTAL DEVIATION: Chronic | ICD-10-CM

## 2021-06-10 DIAGNOSIS — H81.09 MENIERE'S DISEASE, COCHLEOVESTIBULAR, ACTIVE, UNSPECIFIED LATERALITY: Primary | Chronic | ICD-10-CM

## 2021-06-10 PROCEDURE — 99214 OFFICE O/P EST MOD 30 MIN: CPT | Performed by: OTOLARYNGOLOGY

## 2021-06-10 RX ORDER — TRIAMTERENE AND HYDROCHLOROTHIAZIDE 37.5; 25 MG/1; MG/1
1 CAPSULE ORAL DAILY
Qty: 90 CAPSULE | Refills: 3 | Status: SHIPPED | OUTPATIENT
Start: 2021-06-10 | End: 2022-07-05

## 2021-06-10 ASSESSMENT — ENCOUNTER SYMPTOMS
RHINORRHEA: 0
SINUS PAIN: 0
SORE THROAT: 0
VOICE CHANGE: 0
TROUBLE SWALLOWING: 0

## 2021-06-10 NOTE — PROGRESS NOTES
problem. Diagnoses and all orders for this visit:    Meniere's disease, cochleovestibular, active, unspecified laterality  Comments:  Well-controlled with Dyazide therapy. Orders:  -     triamterene-hydroCHLOROthiazide (DYAZIDE) 37.5-25 MG per capsule; Take 1 capsule by mouth daily    Nasal septal deviation  Comments:  Severe rightward, with near-total right nasal airway obstruction and significant right nasal dyspnea. Patient declined surgical repair. RECOMMENDATIONS/PLAN      1. Continue Dyazide daily. 2. Patient understands he needs his potassium checked at least once a year. 3. Septal reconstruction and left inferior turbinate reduction to improve nasal airway and ameliorate nasal dyspnea. Patient declined. 4. Return in about 1 year (around 6/10/2022) for recheck/follow-up, and sooner if condition worsens. MEDICAL DECISION MAKING    # and complexity of problems addressed:  88320 - Moderate  2 or more stable chronic illnesses      Amount and/or Complexity of Data to be Reviewed and Analyzed  36870 - Straightforward  no data or only 1 test or document reviewed or ordered    Risk of Complications and /or Morbidity or Mortality of Patient Management  40070 - Moderate  Prescription drug management (Dyazide)  Decision regarding elective major surgery without identified patient or procedure risk factors (declined recommended surgery of septal reconstruction).

## 2021-09-13 ENCOUNTER — OFFICE VISIT (OUTPATIENT)
Dept: FAMILY MEDICINE CLINIC | Age: 66
End: 2021-09-13
Payer: COMMERCIAL

## 2021-09-13 VITALS — TEMPERATURE: 97.9 F | HEART RATE: 82 BPM | OXYGEN SATURATION: 96 %

## 2021-09-13 DIAGNOSIS — J06.9 URI, ACUTE: Primary | ICD-10-CM

## 2021-09-13 DIAGNOSIS — L98.9 SKIN LESION: ICD-10-CM

## 2021-09-13 PROCEDURE — 99213 OFFICE O/P EST LOW 20 MIN: CPT | Performed by: FAMILY MEDICINE

## 2021-09-13 RX ORDER — AMOXICILLIN 500 MG/1
1000 CAPSULE ORAL 2 TIMES DAILY
Qty: 28 CAPSULE | Refills: 0 | Status: SHIPPED | OUTPATIENT
Start: 2021-09-13 | End: 2021-09-20

## 2021-09-13 NOTE — PROGRESS NOTES
Subjective:      Patient ID: Maribell Mitchell is a 77 y.o. male. HPI patient presents with a 4-day history of congestion. He states he has a business trip to attend to and then he is can be seen his granddaughter later this weekend. He went to make sure he does not have Covid. He is having a lot of nasal congestion and drainage. No reported fevers or chills. Cough sometimes at nighttime. No diarrhea. He also has a skin lesion on his scalp that has been treated with cryotherapy in the past.    Review of Systems  Allergies   Allergen Reactions    Seasonal      sneezing     Vitals:    09/13/21 1422   Pulse: 82   Temp: 97.9 °F (36.6 °C)   SpO2: 96%       Objective:   Physical Exam  Vitals reviewed. Constitutional:       General: He is not in acute distress. Appearance: He is well-developed. HENT:      Right Ear: Tympanic membrane normal.      Left Ear: Tympanic membrane normal.      Nose: Mucosal edema, congestion and rhinorrhea present. Right Sinus: No maxillary sinus tenderness or frontal sinus tenderness. Left Sinus: No maxillary sinus tenderness or frontal sinus tenderness. Pulmonary:      Effort: Pulmonary effort is normal. No respiratory distress. Breath sounds: Normal breath sounds. Musculoskeletal:      Cervical back: Neck supple. Lymphadenopathy:      Cervical: No cervical adenopathy. Skin:     Findings: Lesion present. Comments: Crown of head with a raised scaly 3 mm skin lesion   Neurological:      Mental Status: He is alert. Psychiatric:         Behavior: Behavior is cooperative. Assessment:      Elvis Ramos was seen today for congestion. Diagnoses and all orders for this visit:    URI, acute  -     Cancel: Covid-19, Antibody; Future  -     Covid-19 Ambulatory    Skin lesion    Other orders  -     amoxicillin (AMOXIL) 500 MG capsule;  Take 2 capsules by mouth 2 times daily for 7 days            Plan:      Continue with symptomatic treatment  Proceed with Covid testing  Start antibiotic therapy for presumed sinusitis    He will need RTC appointment for removal of the skin lesion on his scalp    Medical decision making of low complexity    Please note that this chart was generated using Dragon dictation software. Although every effort was made to ensure the accuracy of this automated transcription, some errors in transcription may have occurred.               Marcella Stahl MD

## 2021-09-14 LAB — SARS-COV-2: DETECTED

## 2021-12-20 ENCOUNTER — TELEPHONE (OUTPATIENT)
Dept: FAMILY MEDICINE CLINIC | Age: 66
End: 2021-12-20

## 2021-12-20 NOTE — TELEPHONE ENCOUNTER
Patient called and stated that he is having surgery on 1/7/22 with [facility] for his left shoulder. He is needing a physical prior to surgery. Where can this patient be scheduled for his Pre Op before the 7th of January.        Please robert Valdez [phone]

## 2021-12-20 NOTE — TELEPHONE ENCOUNTER
----- Message from Anne Juaquin sent at 12/20/2021  8:40 AM EST -----  Subject: Appointment Request    Reason for Call: Routine Physical Exam    QUESTIONS  Type of Appointment? Established Patient  Reason for appointment request? No appointments available during search  Additional Information for Provider? Pt called in to schedule an latesha   before no latesha are available pt states that he already has surgery   scheduled for. 01/07 and would like to be seen before that date.   ---------------------------------------------------------------------------  --------------  CALL BACK INFO  What is the best way for the office to contact you? OK to leave message on   voicemail  Preferred Call Back Phone Number? [phone]  ---------------------------------------------------------------------------  --------------  SCRIPT ANSWERS  Relationship to Patient? Self  (If the patient has Medicare as their primary insurance coverage ask this   question) Are you requesting a Medicare Annual Wellness Visit? No  (Is the patient requesting a pap smear with their physical exam?)? No  (Is the patient requesting their annual physical and does not need PAP or   AWV per above?)? Yes   Have you been diagnosed with, awaiting test results for, or told that you   are suspected of having COVID-19 (Coronavirus)? (If patient has tested   negative or was tested as a requirement for work, school, or travel and   not based on symptoms, answer no)? No  Within the past two weeks have you developed any of the following symptoms   (answer no if symptoms have been present longer than 2 weeks or began   more than 2 weeks ago)? Fever or Chills, Cough, Shortness of breath or   difficulty breathing, Loss of taste or smell, Sore throat, Nasal   congestion, Sneezing or runny nose, Fatigue or generalized body aches   (answer no if pain is specific to a body part e.g. back pain), Diarrhea,   Headache?  No  Have you had close contact with someone with COVID-19 in the

## 2021-12-21 ENCOUNTER — OFFICE VISIT (OUTPATIENT)
Dept: FAMILY MEDICINE CLINIC | Age: 66
End: 2021-12-21
Payer: COMMERCIAL

## 2021-12-21 VITALS
SYSTOLIC BLOOD PRESSURE: 136 MMHG | TEMPERATURE: 97.6 F | RESPIRATION RATE: 12 BRPM | WEIGHT: 179.25 LBS | OXYGEN SATURATION: 98 % | BODY MASS INDEX: 26.28 KG/M2 | DIASTOLIC BLOOD PRESSURE: 82 MMHG | HEART RATE: 89 BPM

## 2021-12-21 DIAGNOSIS — Z01.818 PRE-OP EXAMINATION: ICD-10-CM

## 2021-12-21 DIAGNOSIS — E78.2 MIXED HYPERLIPIDEMIA: ICD-10-CM

## 2021-12-21 DIAGNOSIS — H81.09 ACTIVE COCHLEOVESTIBULAR MENIERE'S DISEASE, UNSPECIFIED LATERALITY: ICD-10-CM

## 2021-12-21 DIAGNOSIS — M67.912 ROTATOR CUFF DISORDER, LEFT: Primary | ICD-10-CM

## 2021-12-21 PROCEDURE — 93000 ELECTROCARDIOGRAM COMPLETE: CPT | Performed by: FAMILY MEDICINE

## 2021-12-21 PROCEDURE — 99213 OFFICE O/P EST LOW 20 MIN: CPT | Performed by: FAMILY MEDICINE

## 2021-12-21 NOTE — PROGRESS NOTES
Preoperative Consultation    Saintclair Bourgeois  YOB: 1955    This patient presents to the office today for a preoperative consultation at the request of surgeon, Dr. Lobito Ward, who plans on performing left shoulder surgery on January 27 at [facility].       Planned anesthesia: General   Known anesthesia problems: None   Bleeding risk: No recent or remote history of abnormal bleeding  Personal or FH of DVT/PE: No      Patient Active Problem List   Diagnosis    Tinnitus    Meniere's disease, cochleovestibular, active    Mixed hyperlipidemia    Primary osteoarthritis involving multiple joints     Past Surgical History:   Procedure Laterality Date    BLEPHAROPLASTY Bilateral 2/14/2019    BILATERAL LOWER EYE BLEPHAROPLASTY performed by Lorenzo Ivy MD at [address] Right     KNEE ARTHROSCOPY Left     OTHER SURGICAL HISTORY Right 11/04/2016    Right rotator cuff repair    [place] Claudine Haley [address] Left 10/30/2018    ROBOT ASSISTED LAPAROSCOPIC LEFT INGUINAL HERNIA REPAIR WITH MESH AND OPEN UMBILICAL HERNIA REPAIR performed by Jhon Olson MD at [address] [facility] Right     rotator cuff    VASECTOMY         Allergies   Allergen Reactions    Seasonal      sneezing     Outpatient Medications Marked as Taking for the 12/21/21 encounter (Office Visit) with Whit Fairbanks MD   Medication Sig Dispense Refill    atorvastatin (LIPITOR) 40 MG tablet TAKE 1 TABLET BY MOUTH EVERY DAY 90 tablet 1    triamterene-hydroCHLOROthiazide (DYAZIDE) 37.5-25 MG per capsule Take 1 capsule by mouth daily 90 capsule 3    hydrocortisone (ANUSOL-HC) 2.5 % CREA rectal cream Place rectally 2 times daily (Patient taking differently: Place rectally 2 times daily as needed ) 1 Tube 0    sildenafil (VIAGRA) 100 MG tablet Take 1 tablet by mouth daily as needed for Erectile Dysfunction 10 tablet 5       Social History     Tobacco Use    Smoking status: Never Smoker    Smokeless tobacco: Never Used   Substance Use Topics    Alcohol use: Yes     Comment: occ alcohol use/ weekends 3-4 drinks     Family History   Problem Relation Age of Onset    Cancer Mother         lymphoma    Heart Disease Father         valvular    Bleeding Prob Father         PUD       Review of Systems  A comprehensive review of systems was negative except for what was noted in the HPI. Physical Exam   /82 (Site: Right Upper Arm, Position: Sitting, Cuff Size: Medium Adult)   Pulse 89   Temp 97.6 °F (36.4 °C) (Infrared)   Resp 12   Wt 179 lb 4 oz (81.3 kg)   SpO2 98%   BMI 26.28 kg/m²   Weight: 179 lb 4 oz (81.3 kg)   Constitutional: He is oriented to person, place, and time. He appears well-developed and well-nourished. No distress. HENT:   Head: Normocephalic and atraumatic. Mouth/Throat: Uvula is midline, oropharynx is clear and moist and mucous membranes are normal.   Eyes: Conjunctivae and EOM are normal. Pupils are equal, round, and reactive to light. Neck: Trachea normal and normal range of motion. Neck supple. No JVD present. Carotid bruit is not present. No mass and no thyromegaly present. Cardiovascular: Normal rate, regular rhythm, normal heart sounds and intact distal pulses. Exam reveals no gallop and no friction rub. No murmur heard. Pulmonary/Chest: Effort normal and breath sounds normal. No respiratory distress. He has no wheezes. He has no rales. Abdominal: Soft. Normal aorta and bowel sounds are normal. He exhibits no distension and no mass. There is no hepatosplenomegaly. No tenderness. Musculoskeletal: He exhibits no edema and no tenderness. Neurological: He is alert and oriented to person, place, and time. He has normal strength. No cranial nerve deficit or sensory deficit. Coordination and gait normal.   Skin: Skin is warm and dry. No rash noted. No erythema. EKG Interpretation:  normal EKG, normal sinus rhythm, unchanged from previous tracings.     Lab Review No       Assessment:       Adam Davis was seen today for pre-op exam.    Diagnoses and all orders for this visit:    Rotator cuff disorder, left    Pre-op examination  -     EKG 12 Lead    Mixed hyperlipidemia    Active cochleovestibular Meniere's disease, unspecified laterality      77 y.o. patient  approved for Surgery         Plan:     1. Preoperative workup as follows: none  2. Change in medication regimen before surgery: Hold all medications on morning of surgery  3. No contraindications to planned surgery  4. Medical decision making of moderate complexity. Note electronically signed by provider.

## 2021-12-29 LAB
ALBUMIN SERPL-MCNC: 4.2 G/DL (ref 3.5–5.7)
ALP BLD-CCNC: 69 U/L (ref 36–125)
ALT SERPL-CCNC: 30 U/L (ref 7–52)
ANION GAP SERPL CALCULATED.3IONS-SCNC: 5 MMOL/L (ref 3–16)
AST SERPL-CCNC: 24 U/L (ref 13–39)
BILIRUB SERPL-MCNC: 0.7 MG/DL (ref 0–1.5)
BUN BLDV-MCNC: 23 MG/DL (ref 7–25)
CALCIUM SERPL-MCNC: 9.4 MG/DL (ref 8.6–10.3)
CHLORIDE BLD-SCNC: 105 MMOL/L (ref 98–110)
CHOLESTEROL, TOTAL: 123 MG/DL (ref 0–200)
CO2: 32 MMOL/L (ref 21–33)
CREAT SERPL-MCNC: 0.86 MG/DL (ref 0.6–1.3)
GFR, ESTIMATED: >90 SEE NOTE.
GFR, ESTIMATED: >90 SEE NOTE.
GLUCOSE BLD-MCNC: 99 MG/DL (ref 70–100)
HDLC SERPL-MCNC: 49 MG/DL (ref 60–92)
LDL CHOLESTEROL CALCULATED: 47 MG/DL
OSMOLALITY CALCULATION: 298 MOSM/KG (ref 278–305)
POTASSIUM SERPL-SCNC: 3.9 MMOL/L (ref 3.5–5.3)
PROSTATE SPECIFIC ANTIGEN: 3.65 NG/ML (ref 0–4)
SODIUM BLD-SCNC: 142 MMOL/L (ref 133–146)
TOTAL PROTEIN: 6.5 G/DL (ref 6.4–8.9)
TRIGL SERPL-MCNC: 137 MG/DL (ref 10–149)

## 2022-01-17 ENCOUNTER — HOSPITAL ENCOUNTER (OUTPATIENT)
Dept: PHYSICAL THERAPY | Age: 67
Setting detail: THERAPIES SERIES
Discharge: HOME | End: 2022-01-17
Payer: COMMERCIAL

## 2022-01-17 PROCEDURE — 97161 PT EVAL LOW COMPLEX 20 MIN: CPT

## 2022-01-17 PROCEDURE — 97110 THERAPEUTIC EXERCISES: CPT

## 2022-01-17 NOTE — FLOWSHEET NOTE
[facility] and [address] [place] [address], [facility] [address] [facility], [address] [place] [address]  Phone: [phone]   Fax:     [phone]    Physical Therapy Daily Treatment Note  Date:  2022    Patient Name:  Elliott Kern    :  1955  MRN: [id number]  Restrictions/Precautions:    Medical/Treatment Diagnosis Information:  Diagnosis: M75.122 Complete Rotator Cuff tear or rupture of left shoulder  Treatment Diagnosis: M25.512 Left Shoulder Pain  Insurance/Certification information:  PT Insurance Information: SamiraLynne Smithjose danielvignesh Angelmichael 150  Physician Information:  Referring Practitioner: Baylee Bazan DO  Has the plan of care been signed (Y/N):        []  Yes  [x]  No     Date of Patient follow up with Physician:       Is this a Progress Report:     []  Yes  [x]  No        If Yes:  Date Range for reporting period:  Beginning 22  Ending 22     Progress report will be due (10 Rx or 30 days whichever is less): 30       Recertification will be due (POC Duration  / 90 days whichever is less):22         Visit # Insurance Allowable Auth Required   1   TBD [x]  Yes []  No        Functional Scale: UEFS: 76.25% deficit   Date assessed: 22     Latex Allergy:  [x]NO      []YES  Preferred Language for Healthcare:   [x]English       []other:    Pain level:  0-4/10     SUBJECTIVE:  See eval        OBJECTIVE:   ROM    *Not tested due to recent surgery PROM AROM  Comment    L R L R    Flexion 90       Abduction        ER        IR        Other  (cervical)        Other             Strength      *Not tested due to recent surgery L R Comment   Flexion      Abduction      ER      IR      Supraspinatus      Upper Trap      Lower Trap      Mid Trap      Rhomboids      Biceps      Triceps      Horizontal Abduction      Horizontal Adduction      Lats        Special Tests    *Not tested due to recent surgery Results/Comment   Jeannine Maurer OBriens    Apprehension     Load & Shift         Reflexes/Sensation: 1/17              [x]Dermatomes/Myotomes intact               []Reflexes equal and normal bilaterally               []Other:   `  Joint mobility: 1/17              [x]Normal               []Hypo              []Hyper     Palpation: Denies TTP 1/17     Functional Mobility/Transfers: Unable to use L UE secondary to surgical procedure and post-operative restrictions and protocol  1/17     Posture: WNL; wearing sling  1/17     Gait: (include devices/WB status): WNL  1/17                       [x] Patient history, allergies, meds reviewed. Medical chart reviewed. See intake form. 1/17     Review Of Systems (ROS):   [x]Performed Review of systems (Integumentary, CardioPulmonary, Neurological) by intake and observation. Intake form has been scanned into medical record. Patient has been instructed to contact their primary care physician regarding ROS issues if not already being addressed at this time.   1/17     RESTRICTIONS/PRECAUTIONS: L RCR 1/17/22    Exercises/Interventions:   Exercises:  Exercise/Equipment Resistance/Repetitions Other comments   Stretching/PROM     Wand     Table Slides 10 sec x 10    UE [place]     Pulleys     Pendulum     UE Hang 10 sec x 10    UT stretch 30 sec x 3    Isometrics     Retraction      30x    Weight shift     Flexion     Abduction     External Rotation     Internal Rotation     Biceps     Triceps          PRE's     Flexion     Abduction     External Rotation     Internal Rotation     Shrugs 3 x 10    EXT     Reverse Flys     Serratus     Horizontal Abd with ER     Biceps NPV    Triceps     Retraction 3 x 10         Cable Column/Theraband     External Rotation     Internal Rotation     Shrugs     Lats     Ext     Flex     Scapular Retraction     BIC     TRIC     PNF          Dynamic Stability          Plyoback          Manual interventions                     Therapeutic Exercise and NMR EXR  [x] (67770) Provided verbal/tactile cueing for activities related to strengthening, flexibility, endurance, ROM  for improvements in scapular, scapulothoracic and UE control with self care, reaching, carrying, lifting, house/yardwork, driving/computer work.    [] (80155) Provided verbal/tactile cueing for activities related to improving balance, coordination, kinesthetic sense, posture, motor skill, proprioception  to assist with  scapular, scapulothoracic and UE control with self care, reaching, carrying, lifting, house/yardwork, driving/computer work. Therapeutic Activities:    [] (26768 or 60923) Provided verbal/tactile cueing for activities related to improving balance, coordination, kinesthetic sense, posture, motor skill, proprioception and motor activation to allow for proper function of scapular, scapulothoracic and UE control with self care, carrying, lifting, driving/computer work.      Home Exercise Program:    [x] (66734) Reviewed/Progressed HEP activities related to strengthening, flexibility, endurance, ROM of scapular, scapulothoracic and UE control with self care, reaching, carrying, lifting, house/yardwork, driving/computer work  [] (66900) Reviewed/Progressed HEP activities related to improving balance, coordination, kinesthetic sense, posture, motor skill, proprioception of scapular, scapulothoracic and UE control with self care, reaching, carrying, lifting, house/yardwork, driving/computer work      Manual Treatments:  PROM / STM / Oscillations-Mobs:  G-I, II, III, IV (PA's, Inf., Post.)  [] (99815) Provided manual therapy to mobilize soft tissue/joints of cervical/CT, scapular GHJ and UE for the purpose of modulating pain, promoting relaxation,  increasing ROM, reducing/eliminating soft tissue swelling/inflammation/restriction, improving soft tissue extensibility and allowing for proper ROM for normal function with self care, reaching, carrying, lifting, house/yardwork, driving/computer work    Modalities: Charges:  Timed Code Treatment Minutes: 30 + EVAL   Total Treatment Minutes: 60       [x] EVAL (LOW) 35445 (typically 20 minutes face-to-face)  [] EVAL (MOD) 99650 (typically 30 minutes face-to-face)  [] EVAL (HIGH) 31680 (typically 45 minutes face-to-face)  [] RE-EVAL     [x] KA(02785) x 2    [] IONTO  [] NMR (41160) x     [] VASO  [] Manual (80863) x      [] Other:  [] TA x      [] Mech Traction (22353)  [] ES(attended) (34877)      [] ES (un) (33734):     GOALS:  Patient stated goal: Return to using L UE  [] Progressing: [] Met: [] Not Met: [] Adjusted    Therapist goals for Patient:   Short Term Goals: To be achieved in: 2 weeks  1. Independent in HEP and progression per patient tolerance, in order to prevent re-injury. [] Progressing: [] Met: [] Not Met: [] Adjusted   2. Patient will have a decrease in pain to facilitate improvement in movement, function, and ADLs as indicated by Functional Deficits. [] Progressing: [] Met: [] Not Met: [] Adjusted    Long Term Goals: To be achieved in: 16 weeks  1. Disability index score of 25% or less for the UEFS to assist with reaching prior level of function. [] Progressing: [] Met: [] Not Met: [] Adjusted  2. Patient will demonstrate increased AROM to WNL to allow for proper joint functioning as indicated by patients Functional Deficits. [] Progressing: [] Met: [] Not Met: [] Adjusted  3. Patient will demonstrate an increase in strength to good scapular and core control  for UE to allow for proper functional mobility as indicated by patients Functional Deficits. [] Progressing: [] Met: [] Not Met: [] Adjusted  4. Patient will return to Independent functional activities without increased symptoms or restriction. [] Progressing: [] Met: [] Not Met: [] Adjusted  5. Patient will report being able to ride motorcycle with no increase in pain.   [] Progressing: [] Met: [] Not Met: [] Adjusted     Overall Progression Towards Functional goals/ Treatment Progress Update:  [] Patient is progressing as expected towards functional goals listed. [] Progression is slowed due to complexities/Impairments listed. [] Progression has been slowed due to co-morbidities. [x] Plan just implemented, too soon to assess goals progression <30days   [] Goals require adjustment due to lack of progress  [] Patient is not progressing as expected and requires additional follow up with physician  [] Other    Prognosis for POC: [x] Good [] Fair  [] Poor      Patient requires continued skilled intervention: [x] Yes  [] No    Treatment/Activity Tolerance:  [x] Patient able to complete treatment  [] Patient limited by fatigue  [] Patient limited by pain     [] Patient limited by other medical complications  [] Other:                   Patient Education:                  PLAN: See eval  [] Continue per plan of care [] Alter current plan (see comments above)  [x] Plan of care initiated [] Hold pending MD visit [] Discharge      Electronically signed by:  Manoj Cedillo PT, DPT    Note: If patient does not return for scheduled/ recommended follow up visits, this note will serve as a discharge from care along with most recent update on progress.

## 2022-01-17 NOTE — PLAN OF CARE
The [address]  Phone [phone]  Fax [phone]      Physical Therapy Certification    Dear Referring Practitioner: Roger Mejía DO,    We had the pleasure of evaluating the following patient for physical therapy services at [address] [place] [address]. A summary of our findings can be found in the initial assessment below. This includes our plan of care. If you have any questions or concerns regarding these findings, please do not hesitate to contact me at the office phone number checked above. Thank you for the referral.       Physician Signature:_______________________________Date:__________________  By signing above (or electronic signature), therapists plan is approved by physician      Patient: Edith Newsome   : 1955   MRN: [id number]  Referring Physician: Referring Practitioner: Roger Mejía DO      Evaluation Date: 2022      Medical Diagnosis Information:  Diagnosis: M75.122 Complete Rotator Cuff tear or rupture of left shoulder   Treatment Diagnosis: M25.512 Left Shoulder Pain                                         Insurance information: PT Insurance Information: BCBS    Precautions/ Contra-indications:  L RCR 22    C-SSRS Triggered by Intake questionnaire (Past 2 wk assessment):   [x] No, Questionnaire did not trigger screening.   [] Yes, Patient intake triggered further evaluation      [] C-SSRS Screening completed  [] PCP notified via Plan of Care  [] Emergency services notified     Latex Allergy:  [x]NO      []YES  Preferred Language for Healthcare:   [x]English       []other:    SUBJECTIVE: Patient stated complaint: Patient is a 77year old male who presents to the clinic status-post L shoulder arthroscopy. Patient had surgery on 22. He states that he is doing pretty well. Patient states that his pain is well controlled.  He states that he had surgery on his R side in 2016 and has recovered well from that. Procedure performed: L Shoulder Rotator Cuff Repair/ Augmentation, SAD, DCE, and Debridement of LHB  1/7/22. Relevant Medical History: R RCR 2016  Functional Disability Index: UEFS: 76.25% deficit    Pain Scale: 0-4/10  Easing factors: medication; icing; resting  Provocative factors: moving UE     Type: []Constant   [x]Intermittent  []Radiating []Localized []other:     Numbness/Tingling: Denies    Occupation/School: Sales    Living Status/Prior Level of Function: Independent with ADLs and IADLs, Working out    OBJECTIVE:     ROM  1/17  *Not tested due to recent surgery PROM AROM  Comment    L R L R    Flexion 90       Abduction        ER        IR        Other  (cervical)        Other             Strength   1/17   *Not tested due to recent surgery L R Comment   Flexion      Abduction      ER      IR      Supraspinatus      Upper Trap      Lower Trap      Mid Trap      Rhomboids      Biceps      Triceps      Horizontal Abduction      Horizontal Adduction      Lats        Special Tests  1/17  *Not tested due to recent surgery Results/Comment   Jeannine Gresham    Speeds    OBriens    Apprehension     Load & Shift         Reflexes/Sensation: 1/17              [x]Dermatomes/Myotomes intact               []Reflexes equal and normal bilaterally               []Other:   `  Joint mobility: 1/17              [x]Normal               []Hypo              []Hyper     Palpation: Denies TTP 1/17     Functional Mobility/Transfers: Unable to use L UE secondary to surgical procedure and post-operative restrictions and protocol  1/17     Posture: WNL; wearing sling  1/17     Gait: (include devices/WB status): WNL  1/17                       [x] Patient history, allergies, meds reviewed. Medical chart reviewed. See intake form. 1/17     Review Of Systems (ROS):  [x]Performed Review of systems (Integumentary, CardioPulmonary, Neurological) by intake and observation. Intake form has been scanned into medical record. Patient has been instructed to contact their primary care physician regarding ROS issues if not already being addressed at this time. 1/17     Co-morbidities/Complexities (which will affect course of rehabilitation):   [x]None              Arthritic conditions   []Rheumatoid arthritis (M05.9)  []Osteoarthritis (M19.91)    Cardiovascular conditions   []Hypertension (I10)  []Hyperlipidemia (E78.5)  []Angina pectoris (I20)  []Atherosclerosis (I70)    Musculoskeletal conditions   []Disc pathology   []Congenital spine pathologies   []Prior surgical intervention  []Osteoporosis (M81.8)  []Osteopenia (M85.8)   Endocrine conditions   []Hypothyroid (E03.9)  []Hyperthyroid Gastrointestinal conditions   []Constipation (Y65.66)    Metabolic conditions   []Morbid obesity (E66.01)  []Diabetes type 1(E10.65) or 2 (E11.65)   []Neuropathy (G60.9)      Pulmonary conditions   []Asthma (J45)  []Coughing   []COPD (J44.9)    Psychological Disorders  []Anxiety (F41.9)  []Depression (F32.9)   []Other:    []Other:            Barriers to/and or personal factors that will affect rehab potential:              [x]Age  [x]Sex              [x]Motivation/Lack of Motivation                        []Co-Morbidities              []Cognitive Function, education/learning barriers              []Environmental, home barriers              []profession/work barriers  [x]past PT/medical experience  []other:  Justification:      Falls Risk Assessment (30 days):   [x] Falls Risk assessed and no intervention required.   [] Falls Risk assessed and Patient requires intervention due to being higher risk   TUG score (>12s at risk):     [] Falls education provided, including         ASSESSMENT:   Functional Impairments              []Noted spinal or UE joint hypomobility              []Noted spinal or UE joint hypermobility               [x]Decreased UE functional ROM              [x]Decreased UE functional strength              []Abnormal reflexes/sensation/myotomal/dermatomal deficits              [x]Decreased RC/scapular/core strength and neuromuscular control              []other:       Functional Activity Limitations (from functional questionnaire and intake)              [x]Reduced ability to tolerate prolonged functional positions              [x]Reduced ability or difficulty with changes of positions or transfers between positions              [x]Reduced ability to maintain good posture and demonstrate good body mechanics with sitting, bending, and lifting              [x] Reduced ability or tolerance with driving and/or computer work              [x]Reduced ability to sleep              [x]Reduced ability to perform lifting, reaching, carrying tasks              [x]Reduced ability to tolerate impact through UE              [x]Reduced ability to reach behind back              [x]Reduced ability to  or hold objects              [x]Reduced ability to throw or toss an object              []other:       Participation Restrictions              [x]Reduced participation in self care activities              [x]Reduced participation in home management activities              [x]Reduced participation in work activities              [x]Reduced participation in social activities. []Reduced participation in sport / recreational activities. Classification:              [x]Signs/symptoms consistent with post-surgical status including decreased ROM, strength and function.   []Signs/symptoms consistent with joint sprain/strain              []Signs/symptoms consistent with shoulder impingement              []Signs/symptoms consistent with shoulder/elbow/wrist tendinopathy               []Signs/symptoms consistent with Rotator cuff tear              []Signs/symptoms consistent with labral tear              []Signs/symptoms consistent with postural dysfunction                         []Signs/symptoms consistent with Glenohumeral IR Deficit - <45 degrees              []Signs/symptoms consistent with facet dysfunction of cervical/thoracic spine                         []Signs/symptoms consistent with pathology which may benefit from Dry needling                []other:      Prognosis/Rehab Potential:                                       []Excellent              [x]Good                 []Fair              []Poor     Tolerance of evaluation/treatment:               []Excellent              [x]Good                 []Fair              []Poor     Physical Therapy Evaluation Complexity Justification  [x] A history of present problem with:  [x] no personal factors and/or comorbidities that impact the plan of care;  []1-2 personal factors and/or comorbidities that impact the plan of care  []3 personal factors and/or comorbidities that impact the plan of care  [x] An examination of body systems using standardized tests and measures addressing any of the following: body structures and functions (impairments), activity limitations, and/or participation restrictions;:  [] a total of 1-2 or more elements   [x] a total of 3 or more elements   [] a total of 4 or more elements   [x] A clinical presentation with:  [x] stable and/or uncomplicated characteristics   [] evolving clinical presentation with changing characteristics  [] unstable and unpredictable characteristics;   [x] Clinical decision making of [x] low, [] moderate, [] high complexity using standardized patient assessment instrument and/or measurable assessment of functional outcome.      [x] EVAL (LOW) 94349 (typically 20 minutes face-to-face)  [] EVAL (MOD) 34238 (typically 30 minutes face-to-face)  [] EVAL (HIGH) 42712 (typically 45 minutes face-to-face)  [] RE-EVAL         PLAN:  Frequency/Duration: 1-2 days per week for 16 Weeks:  INTERVENTIONS:  [x] Therapeutic exercise including: strength training, ROM, for Upper extremity and core   [x]  NMR activation and proprioception for UE, scap and Core   [x] Manual therapy as indicated for shoulder, scapula and spine to include: Dry Needling/IASTM, STM, PROM, Gr I-IV mobilizations, manipulation. [x] Modalities as needed that may include: thermal agents, E-stim, Biofeedback, US, iontophoresis as indicated  [x] Patient education on joint protection, postural re-education, activity modification, progression of HEP. HEP instruction:   Access Code: [email or web address]  URL: [email or web address]  Date: 01/17/2022  Prepared by: Charisma Schaefer     GOALS:   Patient stated goal: Return to using L UE  [] Progressing: [] Met: [] Not Met: [] Adjusted    Therapist goals for Patient:   Short Term Goals: To be achieved in: 2 weeks  1. Independent in HEP and progression per patient tolerance, in order to prevent re-injury. [] Progressing: [] Met: [] Not Met: [] Adjusted   2. Patient will have a decrease in pain to facilitate improvement in movement, function, and ADLs as indicated by Functional Deficits. [] Progressing: [] Met: [] Not Met: [] Adjusted    Long Term Goals: To be achieved in: 16 weeks  1. Disability index score of 25% or less for the UEFS to assist with reaching prior level of function. [] Progressing: [] Met: [] Not Met: [] Adjusted  2. Patient will demonstrate increased AROM to WNL to allow for proper joint functioning as indicated by patients Functional Deficits. [] Progressing: [] Met: [] Not Met: [] Adjusted  3. Patient will demonstrate an increase in strength to good scapular and core control  for UE to allow for proper functional mobility as indicated by patients Functional Deficits. [] Progressing: [] Met: [] Not Met: [] Adjusted  4. Patient will return to Independent functional activities without increased symptoms or restriction. [] Progressing: [] Met: [] Not Met: [] Adjusted  5. Patient will report being able to ride motorcycle with no increase in pain.   [] Progressing: [] Met: [] Not Met: [] Adjusted     Electronically signed by:  Mehran Boothe, PT, DPT    Note: If patient does not return for scheduled/ recommended follow up visits, this note will serve as a discharge from care along with most recent update on progress.

## 2022-01-24 ENCOUNTER — HOSPITAL ENCOUNTER (OUTPATIENT)
Dept: PHYSICAL THERAPY | Age: 67
Setting detail: THERAPIES SERIES
Discharge: HOME | End: 2022-01-24
Payer: COMMERCIAL

## 2022-01-24 PROCEDURE — 97110 THERAPEUTIC EXERCISES: CPT

## 2022-01-24 NOTE — FLOWSHEET NOTE
The [facility] and [facility], [address] [place] [address] [address], [address] [facility]  Phone: [phone]   Fax:     [phone]    Physical Therapy Daily Treatment Note  Date:  2022    Patient Name:  Scotty Mari    :  1955  MRN: [id number]  Restrictions/Precautions:    Medical/Treatment Diagnosis Information:  Diagnosis: M75.122 Complete Rotator Cuff tear or rupture of left shoulder  Treatment Diagnosis: M25.512 Left Shoulder Pain  Insurance/Certification information:  PT Insurance Information: Jayro Nelson  Physician Information:  Referring Practitioner: Krupa Powell DO  Has the plan of care been signed (Y/N):        []  Yes  [x]  No     Date of Patient follow up with Physician:       Is this a Progress Report:     []  Yes  [x]  No        If Yes:  Date Range for reporting period:  Beginning 22  Ending 22     Progress report will be due (10 Rx or 30 days whichever is less):        Recertification will be due (POC Duration  / 90 days whichever is less):22         Visit # Insurance Allowable Auth Required   2   TBD [x]  Yes []  No        Functional Scale: UEFS: 76.25% deficit   Date assessed: 22     Latex Allergy:  [x]NO      []YES  Preferred Language for Healthcare:   [x]English       []other:    Pain level:  0-4/10     SUBJECTIVE:   Patient states that he is doing pretty well. He states that he started to sleep in his bed this weekend.       OBJECTIVE:   ROM    *Not tested due to recent surgery PROM AROM  Comment    L R L R    Flexion 90       Abduction        ER        IR        Other  (cervical)        Other             Strength      *Not tested due to recent surgery L R Comment   Flexion      Abduction      ER      IR      Supraspinatus      Upper Trap      Lower Trap      Mid Trap      Rhomboids      Biceps      Triceps      Horizontal Abduction      Horizontal Adduction      Lats        Special Tests 1/17  *Not tested due to recent surgery Results/Comment   Jeannine    Mp    Libertad    OBriens    Apprehension     Load & Shift         Reflexes/Sensation: 1/17              [x]Dermatomes/Myotomes intact               []Reflexes equal and normal bilaterally               []Other:   `  Joint mobility: 1/17              [x]Normal               []Hypo              []Hyper     Palpation: Denies TTP 1/17     Functional Mobility/Transfers: Unable to use L UE secondary to surgical procedure and post-operative restrictions and protocol  1/17     Posture: WNL; wearing sling  1/17     Gait: (include devices/WB status): WNL  1/17                       [x] Patient history, allergies, meds reviewed. Medical chart reviewed. See intake form. 1/17     Review Of Systems (ROS):   [x]Performed Review of systems (Integumentary, CardioPulmonary, Neurological) by intake and observation. Intake form has been scanned into medical record. Patient has been instructed to contact their primary care physician regarding ROS issues if not already being addressed at this time.   1/17     RESTRICTIONS/PRECAUTIONS: L RCR 1/7/22    Exercises/Interventions:   Exercises:  Exercise/Equipment Resistance/Repetitions Other comments   Stretching/PROM     Wand     Table Slides (flexsion and scaption) 10 sec x 10 ^1/24   UE [place]     Pulleys     Pendulum     UE Hang 10 sec x 10    UT stretch 30 sec x 3    Isometrics     Retraction      30x    Weight shift     Flexion     Abduction     External Rotation     Internal Rotation     Biceps     Triceps          PRE's     Flexion     Abduction     External Rotation     Internal Rotation     Shrugs 3 x 10    EXT     Reverse Flys     Serratus     Horizontal Abd with ER     Biceps 3 x 10 Start 1/24   Triceps     Retraction 3 x 10         Cable Column/Theraband     External Rotation     Internal Rotation     Shrugs     Lats     Ext     Flex     Scapular Retraction     BIC     TRIC     PNF          Dynamic Stability          Plyoback          Manual interventions                     Therapeutic Exercise and NMR EXR  [x] (97114) Provided verbal/tactile cueing for activities related to strengthening, flexibility, endurance, ROM  for improvements in scapular, scapulothoracic and UE control with self care, reaching, carrying, lifting, house/yardwork, driving/computer work.    [] (93195) Provided verbal/tactile cueing for activities related to improving balance, coordination, kinesthetic sense, posture, motor skill, proprioception  to assist with  scapular, scapulothoracic and UE control with self care, reaching, carrying, lifting, house/yardwork, driving/computer work. Therapeutic Activities:    [] (94797 or 20925) Provided verbal/tactile cueing for activities related to improving balance, coordination, kinesthetic sense, posture, motor skill, proprioception and motor activation to allow for proper function of scapular, scapulothoracic and UE control with self care, carrying, lifting, driving/computer work.      Home Exercise Program:    [x] (38144) Reviewed/Progressed HEP activities related to strengthening, flexibility, endurance, ROM of scapular, scapulothoracic and UE control with self care, reaching, carrying, lifting, house/yardwork, driving/computer work  [] (84589) Reviewed/Progressed HEP activities related to improving balance, coordination, kinesthetic sense, posture, motor skill, proprioception of scapular, scapulothoracic and UE control with self care, reaching, carrying, lifting, house/yardwork, driving/computer work      Manual Treatments:  PROM / STM / Oscillations-Mobs:  G-I, II, III, IV (PA's, Inf., Post.)  [] (44157) Provided manual therapy to mobilize soft tissue/joints of cervical/CT, scapular GHJ and UE for the purpose of modulating pain, promoting relaxation,  increasing ROM, reducing/eliminating soft tissue swelling/inflammation/restriction, improving soft tissue extensibility and allowing for proper ROM for normal function with self care, reaching, carrying, lifting, house/yardwork, driving/computer work    Modalities:  CP 10 min  1/24    Charges:  Timed Code Treatment Minutes: 30   Total Treatment Minutes: 45       [] EVAL (LOW) 49859 (typically 20 minutes face-to-face)  [] EVAL (MOD) 53002 (typically 30 minutes face-to-face)  [] EVAL (HIGH) 13945 (typically 45 minutes face-to-face)  [] RE-EVAL     [x] PK(64130) x 2    [] IONTO  [] NMR (33778) x     [] VASO  [] Manual (75499) x      [] Other:  [] TA x      [] Mech Traction (72333)  [] ES(attended) (53734)      [] ES (un) (00594):     GOALS:  Patient stated goal: Return to using L UE  [] Progressing: [] Met: [] Not Met: [] Adjusted    Therapist goals for Patient:   Short Term Goals: To be achieved in: 2 weeks  1. Independent in HEP and progression per patient tolerance, in order to prevent re-injury. [] Progressing: [] Met: [] Not Met: [] Adjusted   2. Patient will have a decrease in pain to facilitate improvement in movement, function, and ADLs as indicated by Functional Deficits. [] Progressing: [] Met: [] Not Met: [] Adjusted    Long Term Goals: To be achieved in: 16 weeks  1. Disability index score of 25% or less for the UEFS to assist with reaching prior level of function. [] Progressing: [] Met: [] Not Met: [] Adjusted  2. Patient will demonstrate increased AROM to WNL to allow for proper joint functioning as indicated by patients Functional Deficits. [] Progressing: [] Met: [] Not Met: [] Adjusted  3. Patient will demonstrate an increase in strength to good scapular and core control  for UE to allow for proper functional mobility as indicated by patients Functional Deficits. [] Progressing: [] Met: [] Not Met: [] Adjusted  4. Patient will return to Independent functional activities without increased symptoms or restriction. [] Progressing: [] Met: [] Not Met: [] Adjusted  5.  Patient will report being able to ride motorcycle with no increase in pain. [] Progressing: [] Met: [] Not Met: [] Adjusted     Overall Progression Towards Functional goals/ Treatment Progress Update:  [] Patient is progressing as expected towards functional goals listed. [] Progression is slowed due to complexities/Impairments listed. [] Progression has been slowed due to co-morbidities. [x] Plan just implemented, too soon to assess goals progression <30days   [] Goals require adjustment due to lack of progress  [] Patient is not progressing as expected and requires additional follow up with physician  [] Other    Prognosis for POC: [x] Good [] Fair  [] Poor      Patient requires continued skilled intervention: [x] Yes  [] No    Treatment/Activity Tolerance:  [x] Patient able to complete treatment  [] Patient limited by fatigue  [] Patient limited by pain     [] Patient limited by other medical complications  [x] Other: 1/24 Patient tolerated treatment well this session. No reports of pain with exercises. Progressing well at this time. Continue to progress as tolerated. Patient Education:                  PLAN: See eval  [x] Continue per plan of care [] Alter current plan (see comments above)  [] Plan of care initiated [] Hold pending MD visit [] Discharge      Electronically signed by:  Pablito Brown, PT, DPT    Note: If patient does not return for scheduled/ recommended follow up visits, this note will serve as a discharge from care along with most recent update on progress.

## 2022-01-28 ENCOUNTER — HOSPITAL ENCOUNTER (OUTPATIENT)
Dept: PHYSICAL THERAPY | Age: 67
Setting detail: THERAPIES SERIES
Discharge: HOME | End: 2022-01-28
Payer: COMMERCIAL

## 2022-01-28 PROCEDURE — 97110 THERAPEUTIC EXERCISES: CPT

## 2022-01-28 NOTE — FLOWSHEET NOTE
The [facility] and [facility], [facility] [address], [address] [facility]  Phone: [phone]   Fax:     [phone]    Physical Therapy Daily Treatment Note  Date:  2022    Patient Name:  Juan Luis Carrizales    :  1955  MRN: [id number]  Restrictions/Precautions:    Medical/Treatment Diagnosis Information:  Diagnosis: M75.122 Complete Rotator Cuff tear or rupture of left shoulder  Treatment Diagnosis: M25.512 Left Shoulder Pain  Insurance/Certification information:  PT Insurance Information: Destiney Caul  Physician Information:  Referring Practitioner: Fernando Brito DO  Has the plan of care been signed (Y/N):        []  Yes  [x]  No     Date of Patient follow up with Physician:       Is this a Progress Report:     []  Yes  [x]  No        If Yes:  Date Range for reporting period:  Beginning 22  Ending 22     Progress report will be due (10 Rx or 30 days whichever is less):        Recertification will be due (POC Duration  / 90 days whichever is less):22         Visit # Insurance Allowable Auth Required   3   TBD [x]  Yes []  No        Functional Scale: UEFS: 76.25% deficit   Date assessed: 22     Latex Allergy:  [x]NO      []YES  Preferred Language for Healthcare:   [x]English       []other:    Pain level:  0-4/10     SUBJECTIVE:   Patient states he is doing fine. He states that he gets sore doing out the side stretching but is able to do them.      OBJECTIVE:   ROM    *Not tested due to recent surgery PROM AROM  Comment    L R L R    Flexion 90       Abduction        ER        IR        Other  (cervical)        Other             Strength      *Not tested due to recent surgery L R Comment   Flexion      Abduction      ER      IR      Supraspinatus      Upper Trap      Lower Trap      Mid Trap      Rhomboids      Biceps      Triceps      Horizontal Abduction      Horizontal Adduction      Lats        Special Tests  1/17  *Not tested due to recent surgery Results/Comment   Jeannine    Mp    Libertad    OBriens    Apprehension     Load & Shift         Reflexes/Sensation: 1/17              [x]Dermatomes/Myotomes intact               []Reflexes equal and normal bilaterally               []Other:   `  Joint mobility: 1/17              [x]Normal               []Hypo              []Hyper     Palpation: Denies TTP 1/17     Functional Mobility/Transfers: Unable to use L UE secondary to surgical procedure and post-operative restrictions and protocol  1/17     Posture: WNL; wearing sling  1/17     Gait: (include devices/WB status): WNL  1/17                       [x] Patient history, allergies, meds reviewed. Medical chart reviewed. See intake form. 1/17     Review Of Systems (ROS):   [x]Performed Review of systems (Integumentary, CardioPulmonary, Neurological) by intake and observation. Intake form has been scanned into medical record. Patient has been instructed to contact their primary care physician regarding ROS issues if not already being addressed at this time.   1/17     RESTRICTIONS/PRECAUTIONS: L RCR 1/7/22    Exercises/Interventions:   Exercises:  Exercise/Equipment Resistance/Repetitions Other comments   Stretching/PROM     Wand     Table Slides (flexsion and scaption) 10 sec x 10 ^1/24   UE [place]     Pulleys     ER at side 10 sec x 10 Start 1/28   Pendulum     UE Hang 10 sec x 10    UT stretch 30 sec x 3    Isometrics     Retraction      30x    Weight shift     Flexion     Abduction     External Rotation     Internal Rotation     Biceps     Triceps          PRE's     Flexion     Abduction     External Rotation     Internal Rotation     Shrugs 3 x 10    EXT     Reverse Flys     Serratus     Horizontal Abd with ER     Biceps 3 x 10 Start 1/24   Triceps     Retraction 3 x 10         Cable Column/Theraband     External Rotation     Internal Rotation     Shrugs     Lats     Ext     Flex     Scapular Retraction     BIC     TRIC     PNF          Dynamic Stability          Plyoback          Manual interventions                     Therapeutic Exercise and NMR EXR  [x] (43995) Provided verbal/tactile cueing for activities related to strengthening, flexibility, endurance, ROM  for improvements in scapular, scapulothoracic and UE control with self care, reaching, carrying, lifting, house/yardwork, driving/computer work.    [] (80732) Provided verbal/tactile cueing for activities related to improving balance, coordination, kinesthetic sense, posture, motor skill, proprioception  to assist with  scapular, scapulothoracic and UE control with self care, reaching, carrying, lifting, house/yardwork, driving/computer work. Therapeutic Activities:    [] (40891 or 31519) Provided verbal/tactile cueing for activities related to improving balance, coordination, kinesthetic sense, posture, motor skill, proprioception and motor activation to allow for proper function of scapular, scapulothoracic and UE control with self care, carrying, lifting, driving/computer work.      Home Exercise Program:    [x] (84851) Reviewed/Progressed HEP activities related to strengthening, flexibility, endurance, ROM of scapular, scapulothoracic and UE control with self care, reaching, carrying, lifting, house/yardwork, driving/computer work  [] (53000) Reviewed/Progressed HEP activities related to improving balance, coordination, kinesthetic sense, posture, motor skill, proprioception of scapular, scapulothoracic and UE control with self care, reaching, carrying, lifting, house/yardwork, driving/computer work      Manual Treatments:  PROM / STM / Oscillations-Mobs:  G-I, II, III, IV (PA's, Inf., Post.)  [] (45640) Provided manual therapy to mobilize soft tissue/joints of cervical/CT, scapular GHJ and UE for the purpose of modulating pain, promoting relaxation,  increasing ROM, reducing/eliminating soft tissue swelling/inflammation/restriction, improving soft tissue extensibility and allowing for proper ROM for normal function with self care, reaching, carrying, lifting, house/yardwork, driving/computer work    Modalities:  CP 10 min  1/24    Charges:  Timed Code Treatment Minutes: 30   Total Treatment Minutes: 40  10:45-11:25       [] EVAL (LOW) 29786 (typically 20 minutes face-to-face)  [] EVAL (MOD) 79946 (typically 30 minutes face-to-face)  [] EVAL (HIGH) 16757 (typically 45 minutes face-to-face)  [] RE-EVAL     [x] RG(21769) x 2    [] IONTO  [] NMR (40910) x     [] VASO  [] Manual (45778) x      [] Other:  [] TA x      [] Mech Traction (80949)  [] ES(attended) (91651)      [] ES (un) (87167):     GOALS:  Patient stated goal: Return to using L UE  [] Progressing: [] Met: [] Not Met: [] Adjusted    Therapist goals for Patient:   Short Term Goals: To be achieved in: 2 weeks  1. Independent in HEP and progression per patient tolerance, in order to prevent re-injury. [] Progressing: [] Met: [] Not Met: [] Adjusted   2. Patient will have a decrease in pain to facilitate improvement in movement, function, and ADLs as indicated by Functional Deficits. [] Progressing: [] Met: [] Not Met: [] Adjusted    Long Term Goals: To be achieved in: 16 weeks  1. Disability index score of 25% or less for the UEFS to assist with reaching prior level of function. [] Progressing: [] Met: [] Not Met: [] Adjusted  2. Patient will demonstrate increased AROM to WNL to allow for proper joint functioning as indicated by patients Functional Deficits. [] Progressing: [] Met: [] Not Met: [] Adjusted  3. Patient will demonstrate an increase in strength to good scapular and core control  for UE to allow for proper functional mobility as indicated by patients Functional Deficits. [] Progressing: [] Met: [] Not Met: [] Adjusted  4. Patient will return to Independent functional activities without increased symptoms or restriction.    [] Progressing: [] Met: [] Not Met: [] Adjusted  5. Patient will report being able to ride motorcycle with no increase in pain. [] Progressing: [] Met: [] Not Met: [] Adjusted     Overall Progression Towards Functional goals/ Treatment Progress Update:  [] Patient is progressing as expected towards functional goals listed. [] Progression is slowed due to complexities/Impairments listed. [] Progression has been slowed due to co-morbidities. [x] Plan just implemented, too soon to assess goals progression <30days   [] Goals require adjustment due to lack of progress  [] Patient is not progressing as expected and requires additional follow up with physician  [] Other    Prognosis for POC: [x] Good [] Fair  [] Poor      Patient requires continued skilled intervention: [x] Yes  [] No    Treatment/Activity Tolerance:  [x] Patient able to complete treatment  [] Patient limited by fatigue  [] Patient limited by pain     [] Patient limited by other medical complications  [x] Other: 1/28 Patient tolerated treatment well this session. Slight reports of pain with cane ER at side. Progressing well at this time. Continue to progress as tolerated. Patient Education:                  PLAN: See eval  [x] Continue per plan of care [] Alter current plan (see comments above)  [] Plan of care initiated [] Hold pending MD visit [] Discharge      Electronically signed by:  Manoj Cedillo PT, DPT    Note: If patient does not return for scheduled/ recommended follow up visits, this note will serve as a discharge from care along with most recent update on progress.

## 2022-01-31 ENCOUNTER — HOSPITAL ENCOUNTER (OUTPATIENT)
Dept: PHYSICAL THERAPY | Age: 67
Setting detail: THERAPIES SERIES
Discharge: HOME | End: 2022-01-31
Payer: COMMERCIAL

## 2022-01-31 PROCEDURE — 97110 THERAPEUTIC EXERCISES: CPT

## 2022-01-31 NOTE — FLOWSHEET NOTE
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  Treatment Diagnosis: M25.512 Left Shoulder Pain  Insurance/Certification information:  PT Insurance Information: Iris Anthony  Physician Information:  Referring Practitioner: Prudence Royal DO  Has the plan of care been signed (Y/N):        []  Yes  [x]  No     Date of Patient follow up with Physician:       Is this a Progress Report:     []  Yes  [x]  No        If Yes:  Date Range for reporting period:  Beginning 22  Ending 22     Progress report will be due (10 Rx or 30 days whichever is less): 72       Recertification will be due (POC Duration  / 90 days whichever is less):22         Visit # Insurance Allowable Auth Required   4   TBD [x]  Yes []  No        Functional Scale: UEFS: 76.25% deficit   Date assessed: 22     Latex Allergy:  [x]NO      []YES  Preferred Language for Healthcare:   [x]English       []other:    Pain level:  0-4/10     SUBJECTIVE:   Patient states that he is sleeping about 8 hours a night. He states that he feels pretty good.      OBJECTIVE:   ROM    *Not tested due to recent surgery PROM AROM  Comment    L R L R    Flexion 90       Abduction        ER        IR        Other  (cervical)        Other             Strength      *Not tested due to recent surgery L R Comment   Flexion      Abduction      ER      IR      Supraspinatus      Upper Trap      Lower Trap      Mid Trap      Rhomboids      Biceps      Triceps      Horizontal Abduction      Horizontal Adduction      Lats        Special Tests    *Not tested due to recent surgery Results/Comment   Jeannine    Nestephani    Speeds    OBriens    Apprehension     Load & Shift         Reflexes/Sensation: 1/17              [x]Dermatomes/Myotomes intact               []Reflexes equal and normal bilaterally               []Other:   `  Joint mobility: 1/17              [x]Normal               []Hypo              []Hyper     Palpation: Denies TTP 1/17     Functional Mobility/Transfers: Unable to use L UE secondary to surgical procedure and post-operative restrictions and protocol  1/17     Posture: WNL; wearing sling  1/17     Gait: (include devices/WB status): WNL  1/17                       [x] Patient history, allergies, meds reviewed. Medical chart reviewed. See intake form. 1/17     Review Of Systems (ROS):   [x]Performed Review of systems (Integumentary, CardioPulmonary, Neurological) by intake and observation. Intake form has been scanned into medical record. Patient has been instructed to contact their primary care physician regarding ROS issues if not already being addressed at this time.   1/17     RESTRICTIONS/PRECAUTIONS: L RCR 1/7/22    Exercises/Interventions:   Exercises:  Exercise/Equipment Resistance/Repetitions Other comments   Stretching/PROM     Wand     Table Slides (flexsion and scaption) 10 sec x 10 ^1/24   UE [place]     Pulleys     ER at side 10 sec x 10 Start 1/28   Pendulum     UE Hang 10 sec x 10    UT stretch 30 sec x 3    Isometrics     Retraction      30x    Weight shift     Flexion     Abduction     External Rotation     Internal Rotation     Biceps     Triceps          PRE's     Flexion     Abduction     External Rotation     Internal Rotation     Shrugs 3 x 10    EXT     Reverse Flys     Serratus     Horizontal Abd with ER     Biceps 3 x 10 Start 1/24   Triceps     Retraction 3 x 10         Cable Column/Theraband     External Rotation     Internal Rotation     Shrugs     Lats     Ext     Flex     Scapular Retraction     BIC     TRIC extensibility and allowing for proper ROM for normal function with self care, reaching, carrying, lifting, house/yardwork, driving/computer work    Modalities:  CP 10 min  1/24    Charges:  Timed Code Treatment Minutes: 30   Total Treatment Minutes: 40  8:30-9:10       [] EVAL (LOW) 14117 (typically 20 minutes face-to-face)  [] EVAL (MOD) 15308 (typically 30 minutes face-to-face)  [] EVAL (HIGH) 73814 (typically 45 minutes face-to-face)  [] RE-EVAL     [x] NG(15163) x 2    [] IONTO  [] NMR (78475) x     [] VASO  [] Manual (49048) x      [] Other:  [] TA x      [] Mech Traction (21037)  [] ES(attended) (83183)      [] ES (un) (76925):     GOALS:  Patient stated goal: Return to using L UE  [] Progressing: [] Met: [] Not Met: [] Adjusted    Therapist goals for Patient:   Short Term Goals: To be achieved in: 2 weeks  1. Independent in HEP and progression per patient tolerance, in order to prevent re-injury. [] Progressing: [] Met: [] Not Met: [] Adjusted   2. Patient will have a decrease in pain to facilitate improvement in movement, function, and ADLs as indicated by Functional Deficits. [] Progressing: [] Met: [] Not Met: [] Adjusted    Long Term Goals: To be achieved in: 16 weeks  1. Disability index score of 25% or less for the UEFS to assist with reaching prior level of function. [] Progressing: [] Met: [] Not Met: [] Adjusted  2. Patient will demonstrate increased AROM to WNL to allow for proper joint functioning as indicated by patients Functional Deficits. [] Progressing: [] Met: [] Not Met: [] Adjusted  3. Patient will demonstrate an increase in strength to good scapular and core control  for UE to allow for proper functional mobility as indicated by patients Functional Deficits. [] Progressing: [] Met: [] Not Met: [] Adjusted  4. Patient will return to Independent functional activities without increased symptoms or restriction. [] Progressing: [] Met: [] Not Met: [] Adjusted  5.  Patient will report being able to ride motorcycle with no increase in pain. [] Progressing: [] Met: [] Not Met: [] Adjusted     Overall Progression Towards Functional goals/ Treatment Progress Update:  [] Patient is progressing as expected towards functional goals listed. [] Progression is slowed due to complexities/Impairments listed. [] Progression has been slowed due to co-morbidities. [x] Plan just implemented, too soon to assess goals progression <30days   [] Goals require adjustment due to lack of progress  [] Patient is not progressing as expected and requires additional follow up with physician  [] Other    Prognosis for POC: [x] Good [] Fair  [] Poor      Patient requires continued skilled intervention: [x] Yes  [] No    Treatment/Activity Tolerance:  [x] Patient able to complete treatment  [] Patient limited by fatigue  [] Patient limited by pain     [] Patient limited by other medical complications  [x] Other: 1/31 Patient tolerated treatment well this session. Slight reports of pain with cane ER at side. Progressing well at this time. Continue to progress as tolerated. Patient Education:                  PLAN: See eval  [x] Continue per plan of care [] Alter current plan (see comments above)  [] Plan of care initiated [] Hold pending MD visit [] Discharge      Electronically signed by:  Tomy Cardoso, PT, DPT    Note: If patient does not return for scheduled/ recommended follow up visits, this note will serve as a discharge from care along with most recent update on progress.

## 2022-02-04 ENCOUNTER — HOSPITAL ENCOUNTER (OUTPATIENT)
Dept: PHYSICAL THERAPY | Age: 67
Setting detail: THERAPIES SERIES
End: 2022-02-04
Payer: COMMERCIAL

## 2022-02-08 ENCOUNTER — HOSPITAL ENCOUNTER (OUTPATIENT)
Dept: PHYSICAL THERAPY | Age: 67
Setting detail: THERAPIES SERIES
Discharge: HOME | End: 2022-02-08
Payer: COMMERCIAL

## 2022-02-08 PROCEDURE — 97110 THERAPEUTIC EXERCISES: CPT

## 2022-02-08 NOTE — FLOWSHEET NOTE
tested due to recent surgery Results/Comment   Jeannine    Nestephani    Speeds    OBriens    Apprehension     Load & Shift         Reflexes/Sensation: 1/17              [x]Dermatomes/Myotomes intact               []Reflexes equal and normal bilaterally               []Other:   `  Joint mobility: 1/17              [x]Normal               []Hypo              []Hyper     Palpation: Denies TTP 1/17     Functional Mobility/Transfers: Unable to use L UE secondary to surgical procedure and post-operative restrictions and protocol  1/17     Posture: WNL; wearing sling  1/17     Gait: (include devices/WB status): WNL  1/17                       [x] Patient history, allergies, meds reviewed. Medical chart reviewed. See intake form. 1/17     Review Of Systems (ROS):   [x]Performed Review of systems (Integumentary, CardioPulmonary, Neurological) by intake and observation. Intake form has been scanned into medical record. Patient has been instructed to contact their primary care physician regarding ROS issues if not already being addressed at this time.   1/17     RESTRICTIONS/PRECAUTIONS: L RCR 1/7/22    Exercises/Interventions:   Exercises:  Exercise/Equipment Resistance/Repetitions Other comments   Stretching/PROM     Wand     Table Slides (flexsion and scaption) 10 sec x 10 ^1/24   UE [place]     UE Assisted supine OH flexion 10 sec x 10 Start 2/8   Pulleys     ER at side 10 sec x 10 Start 1/28   Pendulum 30x each direction Start 2/8   UE Hang 10 sec x 10    UT stretch 30 sec x 3    Isometrics     Retraction      30x    Weight shift     Flexion     Abduction     External Rotation     Internal Rotation     Biceps     Triceps          PRE's     Flexion     Abduction     External Rotation     Internal Rotation     Shrugs 3 x 10    EXT     Reverse Flys     Serratus     Horizontal Abd with ER     Biceps 3 x 10 Start 1/24   Triceps     Retraction 3 x 10         Cable Column/Theraband     External Rotation     Internal Rotation     Shrugs     Lats     Ext     Flex     Scapular Retraction     BIC     TRIC     PNF          Dynamic Stability          Plyoback          Manual interventions                     Therapeutic Exercise and NMR EXR  [x] (45137) Provided verbal/tactile cueing for activities related to strengthening, flexibility, endurance, ROM  for improvements in scapular, scapulothoracic and UE control with self care, reaching, carrying, lifting, house/yardwork, driving/computer work.    [] (80001) Provided verbal/tactile cueing for activities related to improving balance, coordination, kinesthetic sense, posture, motor skill, proprioception  to assist with  scapular, scapulothoracic and UE control with self care, reaching, carrying, lifting, house/yardwork, driving/computer work. Therapeutic Activities:    [] (73006 or 31406) Provided verbal/tactile cueing for activities related to improving balance, coordination, kinesthetic sense, posture, motor skill, proprioception and motor activation to allow for proper function of scapular, scapulothoracic and UE control with self care, carrying, lifting, driving/computer work.      Home Exercise Program:    [x] (94829) Reviewed/Progressed HEP activities related to strengthening, flexibility, endurance, ROM of scapular, scapulothoracic and UE control with self care, reaching, carrying, lifting, house/yardwork, driving/computer work  [] (31490) Reviewed/Progressed HEP activities related to improving balance, coordination, kinesthetic sense, posture, motor skill, proprioception of scapular, scapulothoracic and UE control with self care, reaching, carrying, lifting, house/yardwork, driving/computer work      Manual Treatments:  PROM / STM / Oscillations-Mobs:  G-I, II, III, IV (PA's, Inf., Post.)  [] (85446) Provided manual therapy to mobilize soft tissue/joints of cervical/CT, scapular GHJ and UE for the purpose of modulating pain, promoting relaxation,  increasing ROM, reducing/eliminating soft tissue swelling/inflammation/restriction, improving soft tissue extensibility and allowing for proper ROM for normal function with self care, reaching, carrying, lifting, house/yardwork, driving/computer work    Modalities:  CP 15 min  2/8    Charges:  Timed Code Treatment Minutes: 30   Total Treatment Minutes: 45  8:35-9:20       [] EVAL (LOW) 06406 (typically 20 minutes face-to-face)  [] EVAL (MOD) 06667 (typically 30 minutes face-to-face)  [] EVAL (HIGH) 98431 (typically 45 minutes face-to-face)  [] RE-EVAL     [x] QH(50153) x 2    [] IONTO  [] NMR (95380) x     [] VASO  [] Manual (76086) x      [] Other:  [] TA x      [] Mech Traction (52735)  [] ES(attended) (44078)      [] ES (un) (00081):     GOALS:  Patient stated goal: Return to using L UE  [] Progressing: [] Met: [] Not Met: [] Adjusted    Therapist goals for Patient:   Short Term Goals: To be achieved in: 2 weeks  1. Independent in HEP and progression per patient tolerance, in order to prevent re-injury. [] Progressing: [] Met: [] Not Met: [] Adjusted   2. Patient will have a decrease in pain to facilitate improvement in movement, function, and ADLs as indicated by Functional Deficits. [] Progressing: [] Met: [] Not Met: [] Adjusted    Long Term Goals: To be achieved in: 16 weeks  1. Disability index score of 25% or less for the UEFS to assist with reaching prior level of function. [] Progressing: [] Met: [] Not Met: [] Adjusted  2. Patient will demonstrate increased AROM to WNL to allow for proper joint functioning as indicated by patients Functional Deficits. [] Progressing: [] Met: [] Not Met: [] Adjusted  3. Patient will demonstrate an increase in strength to good scapular and core control  for UE to allow for proper functional mobility as indicated by patients Functional Deficits. [] Progressing: [] Met: [] Not Met: [] Adjusted  4.  Patient will return to Independent functional activities without increased symptoms or restriction. [] Progressing: [] Met: [] Not Met: [] Adjusted  5. Patient will report being able to ride motorcycle with no increase in pain. [] Progressing: [] Met: [] Not Met: [] Adjusted     Overall Progression Towards Functional goals/ Treatment Progress Update:  [] Patient is progressing as expected towards functional goals listed. [] Progression is slowed due to complexities/Impairments listed. [] Progression has been slowed due to co-morbidities. [x] Plan just implemented, too soon to assess goals progression <30days   [] Goals require adjustment due to lack of progress  [] Patient is not progressing as expected and requires additional follow up with physician  [] Other    Prognosis for POC: [x] Good [] Fair  [] Poor      Patient requires continued skilled intervention: [x] Yes  [] No    Treatment/Activity Tolerance:  [x] Patient able to complete treatment  [] Patient limited by fatigue  [] Patient limited by pain     [] Patient limited by other medical complications  [x] Other: 2/8 Patient tolerated treatment well this session. Slight reports of pain with cane ER at side. No adverse reactions to new exercises this session. Progressing well at this time. Continue to progress as tolerated. Patient Education:                  PLAN: See eval  [x] Continue per plan of care [] Alter current plan (see comments above)  [] Plan of care initiated [] Hold pending MD visit [] Discharge      Electronically signed by:  Charisma Schaefer PT, DPT    Note: If patient does not return for scheduled/ recommended follow up visits, this note will serve as a discharge from care along with most recent update on progress.

## 2022-02-11 ENCOUNTER — HOSPITAL ENCOUNTER (OUTPATIENT)
Dept: PHYSICAL THERAPY | Age: 67
Setting detail: THERAPIES SERIES
Discharge: HOME | End: 2022-02-11
Payer: COMMERCIAL

## 2022-02-11 PROCEDURE — 97110 THERAPEUTIC EXERCISES: CPT

## 2022-02-11 NOTE — FLOWSHEET NOTE
Special Tests  1/17  *Not tested due to recent surgery Results/Comment   Jeannine    Mp    Libertad    OBriens    Apprehension     Load & Shift         Reflexes/Sensation: 1/17              [x]Dermatomes/Myotomes intact               []Reflexes equal and normal bilaterally               []Other:   `  Joint mobility: 1/17              [x]Normal               []Hypo              []Hyper     Palpation: Denies TTP 1/17     Functional Mobility/Transfers: Unable to use L UE secondary to surgical procedure and post-operative restrictions and protocol  1/17     Posture: WNL; wearing sling  1/17     Gait: (include devices/WB status): WNL  1/17                       [x] Patient history, allergies, meds reviewed. Medical chart reviewed. See intake form. 1/17     Review Of Systems (ROS):   [x]Performed Review of systems (Integumentary, CardioPulmonary, Neurological) by intake and observation. Intake form has been scanned into medical record. Patient has been instructed to contact their primary care physician regarding ROS issues if not already being addressed at this time.   1/17     RESTRICTIONS/PRECAUTIONS: L RCR 1/7/22    Exercises/Interventions:   Exercises:  Exercise/Equipment Resistance/Repetitions Other comments   Stretching/PROM     Wand     Table Slides (flexsion and scaption) 10 sec x 10 ^1/24   UE [place]     UE Assisted supine OH flexion 10 sec x 10 Start 2/8   Pulleys     ER at side (supine) 10 sec x 10 ^2/11   Pendulum 30x each direction Start 2/8   UE Hang 10 sec x 10    UT stretch 30 sec x 3    Isometrics     Retraction      30x    Weight shift     Flexion     Abduction     External Rotation     Internal Rotation     Biceps     Triceps          PRE's     Flexion     Abduction     External Rotation     Internal Rotation     Shrugs 3 x 10    EXT     Reverse Flys     Serratus     Horizontal Abd with ER     Biceps 3 x 10 Start 1/24   Triceps     Retraction 3 x 10         Cable Column/Theraband External Rotation     Internal Rotation     Shrugs     Lats     Ext     Flex     Scapular Retraction     BIC     TRIC     PNF          Dynamic Stability          Plyoback          Manual interventions                     Therapeutic Exercise and NMR EXR  [x] (18090) Provided verbal/tactile cueing for activities related to strengthening, flexibility, endurance, ROM  for improvements in scapular, scapulothoracic and UE control with self care, reaching, carrying, lifting, house/yardwork, driving/computer work.    [] (86041) Provided verbal/tactile cueing for activities related to improving balance, coordination, kinesthetic sense, posture, motor skill, proprioception  to assist with  scapular, scapulothoracic and UE control with self care, reaching, carrying, lifting, house/yardwork, driving/computer work. Therapeutic Activities:    [] (91578 or 23906) Provided verbal/tactile cueing for activities related to improving balance, coordination, kinesthetic sense, posture, motor skill, proprioception and motor activation to allow for proper function of scapular, scapulothoracic and UE control with self care, carrying, lifting, driving/computer work.      Home Exercise Program:    [x] (71348) Reviewed/Progressed HEP activities related to strengthening, flexibility, endurance, ROM of scapular, scapulothoracic and UE control with self care, reaching, carrying, lifting, house/yardwork, driving/computer work  [] (77140) Reviewed/Progressed HEP activities related to improving balance, coordination, kinesthetic sense, posture, motor skill, proprioception of scapular, scapulothoracic and UE control with self care, reaching, carrying, lifting, house/yardwork, driving/computer work      Manual Treatments:  PROM / STM / Oscillations-Mobs:  G-I, II, III, IV (PA's, Inf., Post.)  [] (67891) Provided manual therapy to mobilize soft tissue/joints of cervical/CT, scapular GHJ and UE for the purpose of modulating pain, promoting relaxation,  increasing ROM, reducing/eliminating soft tissue swelling/inflammation/restriction, improving soft tissue extensibility and allowing for proper ROM for normal function with self care, reaching, carrying, lifting, house/yardwork, driving/computer work    Modalities:  CP 15 min  2/8    Charges:  Timed Code Treatment Minutes: 30   Total Treatment Minutes: 50  8:30-9:20       [] EVAL (LOW) 19340 (typically 20 minutes face-to-face)  [] EVAL (MOD) 50546 (typically 30 minutes face-to-face)  [] EVAL (HIGH) 81199 (typically 45 minutes face-to-face)  [] RE-EVAL     [x] CR(67580) x 2    [] IONTO  [] NMR (55688) x     [] VASO  [] Manual (06894) x      [] Other:  [] TA x      [] Mech Traction (46097)  [] ES(attended) (04843)      [] ES (un) (58137):     GOALS:  Patient stated goal: Return to using L UE  [] Progressing: [] Met: [] Not Met: [] Adjusted    Therapist goals for Patient:   Short Term Goals: To be achieved in: 2 weeks  1. Independent in HEP and progression per patient tolerance, in order to prevent re-injury. [] Progressing: [] Met: [] Not Met: [] Adjusted   2. Patient will have a decrease in pain to facilitate improvement in movement, function, and ADLs as indicated by Functional Deficits. [] Progressing: [] Met: [] Not Met: [] Adjusted    Long Term Goals: To be achieved in: 16 weeks  1. Disability index score of 25% or less for the UEFS to assist with reaching prior level of function. [] Progressing: [] Met: [] Not Met: [] Adjusted  2. Patient will demonstrate increased AROM to WNL to allow for proper joint functioning as indicated by patients Functional Deficits. [] Progressing: [] Met: [] Not Met: [] Adjusted  3. Patient will demonstrate an increase in strength to good scapular and core control  for UE to allow for proper functional mobility as indicated by patients Functional Deficits. [] Progressing: [] Met: [] Not Met: [] Adjusted  4.  Patient will return to Independent functional activities without increased symptoms or restriction. [] Progressing: [] Met: [] Not Met: [] Adjusted  5. Patient will report being able to ride motorcycle with no increase in pain. [] Progressing: [] Met: [] Not Met: [] Adjusted     Overall Progression Towards Functional goals/ Treatment Progress Update:  [] Patient is progressing as expected towards functional goals listed. [] Progression is slowed due to complexities/Impairments listed. [] Progression has been slowed due to co-morbidities. [x] Plan just implemented, too soon to assess goals progression <30days   [] Goals require adjustment due to lack of progress  [] Patient is not progressing as expected and requires additional follow up with physician  [] Other    Prognosis for POC: [x] Good [] Fair  [] Poor      Patient requires continued skilled intervention: [x] Yes  [] No    Treatment/Activity Tolerance:  [x] Patient able to complete treatment  [] Patient limited by fatigue  [] Patient limited by pain     [] Patient limited by other medical complications  [x] Other: 2/11 Patient tolerated treatment well this session. Slight reports of pain with cane ER at side; tolerated supine well. No adverse reactions to new exercises this session. Progressing well at this time. Continue to progress as tolerated. Patient Education:                  PLAN: See eval  [x] Continue per plan of care [] Alter current plan (see comments above)  [] Plan of care initiated [] Hold pending MD visit [] Discharge      Electronically signed by:  Alona Vickers PT, DPT    Note: If patient does not return for scheduled/ recommended follow up visits, this note will serve as a discharge from care along with most recent update on progress.

## 2022-02-15 ENCOUNTER — HOSPITAL ENCOUNTER (OUTPATIENT)
Dept: PHYSICAL THERAPY | Age: 67
Setting detail: THERAPIES SERIES
Discharge: HOME | End: 2022-02-15
Payer: COMMERCIAL

## 2022-02-15 PROCEDURE — 97140 MANUAL THERAPY 1/> REGIONS: CPT

## 2022-02-15 PROCEDURE — 97110 THERAPEUTIC EXERCISES: CPT

## 2022-02-15 NOTE — FLOWSHEET NOTE
[facility] and [address] [place] [address], [facility] [address] , [address] [place] [address]  Phone: [phone]   Fax:     [phone]    Physical Therapy Daily Treatment Note  Date:  2/15/2022    Patient Name:  Kathleen Stewart    :  1955  MRN: [id number]  Restrictions/Precautions:    Medical/Treatment Diagnosis Information:  Diagnosis: M75.122 Complete Rotator Cuff tear or rupture of left shoulder  Treatment Diagnosis: M25.512 Left Shoulder Pain  Insurance/Certification information:  PT Insurance Information: Gissell Smithjose danielvignesh Zyndrama [id number]  Physician Information:  Referring Practitioner: Erica Frederick DO  Has the plan of care been signed (Y/N):        []  Yes  [x]  No     Date of Patient follow up with Physician:       Is this a Progress Report:     []  Yes  [x]  No        If Yes:  Date Range for reporting period:  Beginning 22  Ending 22     Progress report will be due (10 Rx or 30 days whichever is less): 66       Recertification will be due (POC Duration  / 90 days whichever is less):22         Visit # Insurance Allowable Auth Required   6  2/15 TBD [x]  Yes []  No        Functional Scale: UEFS: 76.25% deficit   Date assessed: 22     Latex Allergy:  [x]NO      []YES  Preferred Language for Healthcare:   [x]English       []other:    Pain level:  0-4/10 2/15    SUBJECTIVE:  2/15 Patient states that he woke up on Monday morning in a lot of pain. He states that he feels fine right now.      OBJECTIVE:   ROM    *Not tested due to recent surgery PROM AROM  Comment    L R L R    Flexion 90       Abduction        ER        IR        Other  (cervical)        Other             Strength      *Not tested due to recent surgery L R Comment   Flexion      Abduction      ER      IR      Supraspinatus      Upper Trap      Lower Trap      Mid Trap      Rhomboids      Biceps      Triceps      Horizontal Abduction      Horizontal Adduction      Lats        Special Tests 1/17  *Not tested due to recent surgery Results/Comment   Jeannine    Mp    Libertad    OBriens    Apprehension     Load & Shift         Reflexes/Sensation: 1/17              [x]Dermatomes/Myotomes intact               []Reflexes equal and normal bilaterally               []Other:   `  Joint mobility: 1/17              [x]Normal               []Hypo              []Hyper     Palpation: Denies TTP 1/17     Functional Mobility/Transfers: Unable to use L UE secondary to surgical procedure and post-operative restrictions and protocol  1/17     Posture: WNL; wearing sling  1/17     Gait: (include devices/WB status): WNL  1/17                       [x] Patient history, allergies, meds reviewed. Medical chart reviewed. See intake form. 1/17     Review Of Systems (ROS):   [x]Performed Review of systems (Integumentary, CardioPulmonary, Neurological) by intake and observation. Intake form has been scanned into medical record. Patient has been instructed to contact their primary care physician regarding ROS issues if not already being addressed at this time.   1/17     RESTRICTIONS/PRECAUTIONS: L RCR 1/7/22    Exercises/Interventions:   Exercises:  Exercise/Equipment Resistance/Repetitions Other comments   Stretching/PROM     Wand     Table Slides (flexsion and scaption) 10 sec x 10 ^1/24   UE [place]     UE Assisted supine OH flexion 10 sec x 10 Start 2/8   Pulleys 10 sec x 10 Start 2/15   ER at side (supine) 10 sec x 10 ^2/11   Pendulum 30x each direction Start 2/8   UE Hang 10 sec x 10    UT stretch 30 sec x 3    Isometrics     Retraction      30x    Weight shift     Flexion     Abduction     External Rotation     Internal Rotation     Biceps     Triceps          PRE's     Flexion     Abduction     External Rotation     Internal Rotation     Shrugs 3 x 10    EXT     Reverse Flys     Serratus     Horizontal Abd with ER     Biceps 3 x 10 Start 1/24   Triceps     Retraction 3 x 10         Cable Column/Theraband External Rotation     Internal Rotation     Shrugs     Lats     Ext     Flex     Scapular Retraction     BIC     TRIC     PNF          Dynamic Stability          Plyoback          Manual interventions     PROM 8 min Start 2/15              Therapeutic Exercise and NMR EXR  [x] (48527) Provided verbal/tactile cueing for activities related to strengthening, flexibility, endurance, ROM  for improvements in scapular, scapulothoracic and UE control with self care, reaching, carrying, lifting, house/yardwork, driving/computer work.    [] (28554) Provided verbal/tactile cueing for activities related to improving balance, coordination, kinesthetic sense, posture, motor skill, proprioception  to assist with  scapular, scapulothoracic and UE control with self care, reaching, carrying, lifting, house/yardwork, driving/computer work. Therapeutic Activities:    [] (17326 or 42628) Provided verbal/tactile cueing for activities related to improving balance, coordination, kinesthetic sense, posture, motor skill, proprioception and motor activation to allow for proper function of scapular, scapulothoracic and UE control with self care, carrying, lifting, driving/computer work.      Home Exercise Program:    [x] (68084) Reviewed/Progressed HEP activities related to strengthening, flexibility, endurance, ROM of scapular, scapulothoracic and UE control with self care, reaching, carrying, lifting, house/yardwork, driving/computer work  [] (51924) Reviewed/Progressed HEP activities related to improving balance, coordination, kinesthetic sense, posture, motor skill, proprioception of scapular, scapulothoracic and UE control with self care, reaching, carrying, lifting, house/yardwork, driving/computer work      Manual Treatments:  PROM / STM / Oscillations-Mobs:  G-I, II, III, IV (PA's, Inf., Post.)  [x] (57285) Provided manual therapy to mobilize soft tissue/joints of cervical/CT, scapular GHJ and UE for the purpose of modulating pain, promoting relaxation,  increasing ROM, reducing/eliminating soft tissue swelling/inflammation/restriction, improving soft tissue extensibility and allowing for proper ROM for normal function with self care, reaching, carrying, lifting, house/yardwork, driving/computer work    Modalities:  CP 15 min  2/15    Charges:  Timed Code Treatment Minutes: 40   Total Treatment Minutes: 43  8:30-9:30       [] EVAL (LOW) 19800 (typically 20 minutes face-to-face)  [] EVAL (MOD) 27473 (typically 30 minutes face-to-face)  [] EVAL (HIGH) 48579 (typically 45 minutes face-to-face)  [] RE-EVAL     [x] TH(65024) x 2    [] IONTO  [] NMR (44963) x     [] VASO  [x] Manual (67848) x 1      [] Other:  [] TA x      [] Mech Traction (36703)  [] ES(attended) (91247)      [] ES (un) (86869):     GOALS:  Patient stated goal: Return to using L UE  [] Progressing: [] Met: [] Not Met: [] Adjusted    Therapist goals for Patient:   Short Term Goals: To be achieved in: 2 weeks  1. Independent in HEP and progression per patient tolerance, in order to prevent re-injury. [] Progressing: [] Met: [] Not Met: [] Adjusted   2. Patient will have a decrease in pain to facilitate improvement in movement, function, and ADLs as indicated by Functional Deficits. [] Progressing: [] Met: [] Not Met: [] Adjusted    Long Term Goals: To be achieved in: 16 weeks  1. Disability index score of 25% or less for the UEFS to assist with reaching prior level of function. [] Progressing: [] Met: [] Not Met: [] Adjusted  2. Patient will demonstrate increased AROM to WNL to allow for proper joint functioning as indicated by patients Functional Deficits. [] Progressing: [] Met: [] Not Met: [] Adjusted  3. Patient will demonstrate an increase in strength to good scapular and core control  for UE to allow for proper functional mobility as indicated by patients Functional Deficits. [] Progressing: [] Met: [] Not Met: [] Adjusted  4.  Patient will return to Independent functional activities without increased symptoms or restriction. [] Progressing: [] Met: [] Not Met: [] Adjusted  5. Patient will report being able to ride motorcycle with no increase in pain. [] Progressing: [] Met: [] Not Met: [] Adjusted     Overall Progression Towards Functional goals/ Treatment Progress Update:  [] Patient is progressing as expected towards functional goals listed. [] Progression is slowed due to complexities/Impairments listed. [] Progression has been slowed due to co-morbidities. [x] Plan just implemented, too soon to assess goals progression <30days   [] Goals require adjustment due to lack of progress  [] Patient is not progressing as expected and requires additional follow up with physician  [] Other    Prognosis for POC: [x] Good [] Fair  [] Poor      Patient requires continued skilled intervention: [x] Yes  [] No    Treatment/Activity Tolerance:  [x] Patient able to complete treatment  [] Patient limited by fatigue  [] Patient limited by pain     [] Patient limited by other medical complications  [x] Other: 2/15 Patient tolerated treatment well this session. Good tolerance to addition of manual this session. No adverse reactions to new exercises this session. Progressing well at this time. Continue to progress as tolerated. Patient Education:                  PLAN: See eval  [x] Continue per plan of care [] Alter current plan (see comments above)  [] Plan of care initiated [] Hold pending MD visit [] Discharge      Electronically signed by:  Chong Cooper PT, DPT    Note: If patient does not return for scheduled/ recommended follow up visits, this note will serve as a discharge from care along with most recent update on progress.

## 2022-02-18 ENCOUNTER — HOSPITAL ENCOUNTER (OUTPATIENT)
Dept: PHYSICAL THERAPY | Age: 67
Setting detail: THERAPIES SERIES
Discharge: HOME | End: 2022-02-18
Payer: COMMERCIAL

## 2022-02-18 PROCEDURE — 97110 THERAPEUTIC EXERCISES: CPT

## 2022-02-18 PROCEDURE — 97112 NEUROMUSCULAR REEDUCATION: CPT

## 2022-02-18 NOTE — FLOWSHEET NOTE
[address], [facility] [address] [facility], [address] [place] [address]  Phone: [phone]   Fax:     [phone]    Physical Therapy Daily Treatment Note  Date:  2022    Patient Name:  Nathan Carter    :  1955  MRN: [id number]  Restrictions/Precautions:    Medical/Treatment Diagnosis Information:  Diagnosis: M75.122 Complete Rotator Cuff tear or rupture of left shoulder  Treatment Diagnosis: M25.512 Left Shoulder Pain  Insurance/Certification information:  PT Insurance Information: Jaimie Casas  Physician Information:  Referring Practitioner: Deidre Vance DO  Has the plan of care been signed (Y/N):        []  Yes  [x]  No     Date of Patient follow up with Physician:       Is this a Progress Report:     []  Yes  [x]  No        If Yes:  Date Range for reporting period:  Beginning 22  Ending 22     Progress report will be due (10 Rx or 30 days whichever is less):        Recertification will be due (POC Duration  / 90 days whichever is less):22         Visit # Insurance Allowable Auth Required   7   TBD [x]  Yes []  No        Functional Scale: UEFS: 76.25% deficit   Date assessed: 22     Latex Allergy:  [x]NO      []YES  Preferred Language for Healthcare:   [x]English       []other:    Pain level:  0-4/10     SUBJECTIVE:   Patient states that he is doing well.  He states that his appointment went well with MD. Meilsa Gooden OBJECTIVE:   ROM    *Not tested due to recent surgery PROM AROM  Comment    L R L R    Flexion 90       Abduction        ER        IR        Other  (cervical)        Other             Strength      *Not tested due to recent surgery L R Comment   Flexion      Abduction      ER      IR      Supraspinatus      Upper Trap      Lower Trap      Mid Trap      Rhomboids      Biceps      Triceps      Horizontal Abduction      Horizontal Adduction      Lats        Special Tests    *Not tested due to recent surgery Results/Comment   Jeannine Maurer    OBriens    Apprehension     Load & Shift         Reflexes/Sensation: 1/17              [x]Dermatomes/Myotomes intact               []Reflexes equal and normal bilaterally               []Other:   `  Joint mobility: 1/17              [x]Normal               []Hypo              []Hyper     Palpation: Denies TTP 1/17     Functional Mobility/Transfers: Unable to use L UE secondary to surgical procedure and post-operative restrictions and protocol  1/17     Posture: WNL; wearing sling  1/17     Gait: (include devices/WB status): WNL  1/17                       [x] Patient history, allergies, meds reviewed. Medical chart reviewed. See intake form. 1/17     Review Of Systems (ROS):   [x]Performed Review of systems (Integumentary, CardioPulmonary, Neurological) by intake and observation. Intake form has been scanned into medical record. Patient has been instructed to contact their primary care physician regarding ROS issues if not already being addressed at this time.   1/17     RESTRICTIONS/PRECAUTIONS: L RCR 1/7/22    Exercises/Interventions:   Exercises:  Exercise/Equipment Resistance/Repetitions Other comments   Stretching/PROM     Wand     Table Slides (flexsion and scaption) 10 sec x 10 ^1/24   Wall walks 10 sec x 10 Start 2/18   UE [place]     UE Assisted supine OH flexion 10 sec x 10 Start 2/8   Pulleys 10 sec x 10 Start 2/15   ER at side (supine) 10 sec x 10 ^2/11   Pendulum 30x each direction Start 2/8   UE Hang 10 sec x 10    UT stretch 30 sec x 3    Isometrics     Retraction      30x    Weight shift     Flexion     Abduction     External Rotation     Internal Rotation     Biceps     Triceps          PRE's     Flexion     Abduction     External Rotation     Internal Rotation     Shrugs 3 x 10    EXT     Reverse Flys     Serratus     Horizontal Abd with ER     Biceps 3 x 10 Start 1/24   Triceps     Retraction Prone 10 sec x 10 Start 2/18 Cable Column/Theraband     External Rotation     Internal Rotation     Shrugs     Lats     Ext     Flex     Scapular Retraction     BIC     TRIC     PNF          Dynamic Stability          Plyoback          Manual interventions     PROM 8 min Start 2/15              Therapeutic Exercise and NMR EXR  [x] (50260) Provided verbal/tactile cueing for activities related to strengthening, flexibility, endurance, ROM  for improvements in scapular, scapulothoracic and UE control with self care, reaching, carrying, lifting, house/yardwork, driving/computer work.    [] (70197) Provided verbal/tactile cueing for activities related to improving balance, coordination, kinesthetic sense, posture, motor skill, proprioception  to assist with  scapular, scapulothoracic and UE control with self care, reaching, carrying, lifting, house/yardwork, driving/computer work. Therapeutic Activities:    [] (89583 or 13973) Provided verbal/tactile cueing for activities related to improving balance, coordination, kinesthetic sense, posture, motor skill, proprioception and motor activation to allow for proper function of scapular, scapulothoracic and UE control with self care, carrying, lifting, driving/computer work.      Home Exercise Program:    [x] (00401) Reviewed/Progressed HEP activities related to strengthening, flexibility, endurance, ROM of scapular, scapulothoracic and UE control with self care, reaching, carrying, lifting, house/yardwork, driving/computer work  [] (85440) Reviewed/Progressed HEP activities related to improving balance, coordination, kinesthetic sense, posture, motor skill, proprioception of scapular, scapulothoracic and UE control with self care, reaching, carrying, lifting, house/yardwork, driving/computer work      Manual Treatments:  PROM / STM / Oscillations-Mobs:  G-I, II, III, IV (PA's, Inf., Post.)  [x] (47937) Provided manual therapy to mobilize soft tissue/joints of cervical/CT, scapular GHJ and UE for the purpose of modulating pain, promoting relaxation,  increasing ROM, reducing/eliminating soft tissue swelling/inflammation/restriction, improving soft tissue extensibility and allowing for proper ROM for normal function with self care, reaching, carrying, lifting, house/yardwork, driving/computer work    Modalities:  CP 15 min  2/15    Charges:  Timed Code Treatment Minutes: 40   Total Treatment Minutes: 60  8:30-9:30       [] EVAL (LOW) 52250 (typically 20 minutes face-to-face)  [] EVAL (MOD) 02125 (typically 30 minutes face-to-face)  [] EVAL (HIGH) 93315 (typically 45 minutes face-to-face)  [] RE-EVAL     [x] QU(12545) x 2    [] IONTO  [x] NMR (04815) x 1     [] VASO  [] Manual (65067) x       [] Other:  [] TA x      [] Mech Traction (97228)  [] ES(attended) (39792)      [] ES (un) (94133):     GOALS:  Patient stated goal: Return to using L UE  [] Progressing: [] Met: [] Not Met: [] Adjusted    Therapist goals for Patient:   Short Term Goals: To be achieved in: 2 weeks  1. Independent in HEP and progression per patient tolerance, in order to prevent re-injury. [] Progressing: [] Met: [] Not Met: [] Adjusted   2. Patient will have a decrease in pain to facilitate improvement in movement, function, and ADLs as indicated by Functional Deficits. [] Progressing: [] Met: [] Not Met: [] Adjusted    Long Term Goals: To be achieved in: 16 weeks  1. Disability index score of 25% or less for the UEFS to assist with reaching prior level of function. [] Progressing: [] Met: [] Not Met: [] Adjusted  2. Patient will demonstrate increased AROM to WNL to allow for proper joint functioning as indicated by patients Functional Deficits. [] Progressing: [] Met: [] Not Met: [] Adjusted  3. Patient will demonstrate an increase in strength to good scapular and core control  for UE to allow for proper functional mobility as indicated by patients Functional Deficits. [] Progressing: [] Met: [] Not Met: [] Adjusted  4. Patient will return to Independent functional activities without increased symptoms or restriction. [] Progressing: [] Met: [] Not Met: [] Adjusted  5. Patient will report being able to ride motorcycle with no increase in pain. [] Progressing: [] Met: [] Not Met: [] Adjusted     Overall Progression Towards Functional goals/ Treatment Progress Update:  [] Patient is progressing as expected towards functional goals listed. [] Progression is slowed due to complexities/Impairments listed. [] Progression has been slowed due to co-morbidities. [x] Plan just implemented, too soon to assess goals progression <30days   [] Goals require adjustment due to lack of progress  [] Patient is not progressing as expected and requires additional follow up with physician  [] Other    Prognosis for POC: [x] Good [] Fair  [] Poor      Patient requires continued skilled intervention: [x] Yes  [] No    Treatment/Activity Tolerance:  [x] Patient able to complete treatment  [] Patient limited by fatigue  [] Patient limited by pain     [] Patient limited by other medical complications  [x] Other: 2/18 Patient tolerated treatment well this session. Reports of increased soreness with wall walks. Progressing well at this time. Continue to progress as tolerated. Patient Education:                  PLAN: See eval  [x] Continue per plan of care [] Alter current plan (see comments above)  [] Plan of care initiated [] Hold pending MD visit [] Discharge      Electronically signed by:  Tomy Cardoso, PT, DPT    Note: If patient does not return for scheduled/ recommended follow up visits, this note will serve as a discharge from care along with most recent update on progress.

## 2022-02-22 ENCOUNTER — HOSPITAL ENCOUNTER (OUTPATIENT)
Dept: PHYSICAL THERAPY | Age: 67
Setting detail: THERAPIES SERIES
Discharge: HOME | End: 2022-02-22
Payer: COMMERCIAL

## 2022-02-22 PROCEDURE — 97110 THERAPEUTIC EXERCISES: CPT

## 2022-02-22 PROCEDURE — 97112 NEUROMUSCULAR REEDUCATION: CPT

## 2022-02-22 NOTE — FLOWSHEET NOTE
The [facility] and [facility], [facility] [address], [address] [facility]  Phone: [phone]   Fax:     [phone]    Physical Therapy Daily Treatment Note  Date:  2022    Patient Name:  Nathan Carter    :  1955  MRN: [id number]  Restrictions/Precautions:    Medical/Treatment Diagnosis Information:  Diagnosis: M75.122 Complete Rotator Cuff tear or rupture of left shoulder  Treatment Diagnosis: M25.512 Left Shoulder Pain  Insurance/Certification information:  PT Insurance Information: [facility]  Physician Information:  Referring Practitioner: Deidre Vance DO  Has the plan of care been signed (Y/N):        []  Yes  [x]  No     Date of Patient follow up with Physician:       Is this a Progress Report:     []  Yes  [x]  No        If Yes:  Date Range for reporting period:  Beginning 22  Ending 22     Progress report will be due (10 Rx or 30 days whichever is less):        Recertification will be due (POC Duration  / 90 days whichever is less):22         Visit # Insurance Allowable Auth Required   8   TBD [x]  Yes []  No        Functional Scale: UEFS: 76.25% deficit   Date assessed: 22     Latex Allergy:  [x]NO      []YES  Preferred Language for Healthcare:   [x]English       []other:    Pain level:  0-4/10     SUBJECTIVE:   Patient states that he is a little less sore. He states that he has had less sharp pain.      OBJECTIVE:   ROM    *Not tested due to recent surgery PROM AROM  Comment    L R L R    Flexion 90       Abduction        ER        IR        Other  (cervical)        Other             Strength      *Not tested due to recent surgery L R Comment   Flexion      Abduction      ER      IR      Supraspinatus      Upper Trap      Lower Trap      Mid Trap      Rhomboids      Biceps      Triceps      Horizontal Abduction      Horizontal Adduction      Lats        Special Tests    *Not tested due to recent surgery Results/Comment   Jeannine Maurer    OBriens    Apprehension     Load & Shift         Reflexes/Sensation: 1/17              [x]Dermatomes/Myotomes intact               []Reflexes equal and normal bilaterally               []Other:   `  Joint mobility: 1/17              [x]Normal               []Hypo              []Hyper     Palpation: Denies TTP 1/17     Functional Mobility/Transfers: Unable to use L UE secondary to surgical procedure and post-operative restrictions and protocol  1/17     Posture: WNL; wearing sling  1/17     Gait: (include devices/WB status): WNL  1/17                       [x] Patient history, allergies, meds reviewed. Medical chart reviewed. See intake form. 1/17     Review Of Systems (ROS):   [x]Performed Review of systems (Integumentary, CardioPulmonary, Neurological) by intake and observation. Intake form has been scanned into medical record. Patient has been instructed to contact their primary care physician regarding ROS issues if not already being addressed at this time.   1/17     RESTRICTIONS/PRECAUTIONS: L RCR 1/7/22    Exercises/Interventions:   Exercises:  Exercise/Equipment Resistance/Repetitions Other comments   Stretching/PROM     Wand     Table Slides (flexsion and scaption) 10 sec x 10 ^1/24   Wall walks 10 sec x 10 Start 2/18   UE [place]     UE Assisted supine OH flexion 10 sec x 10 Start 2/8   Pulleys 10 sec x 10 Start 2/15   ER at side (supine) 10 sec x 10 ^2/11   Pendulum 30x each direction Start 2/8   UE Hang 10 sec x 10    UT stretch 30 sec x 3    Isometrics     Retraction      30x    Weight shift     Flexion     Abduction     External Rotation     Internal Rotation     Biceps     Triceps          PRE's     Flexion     Abduction     External Rotation     Internal Rotation     Shrugs 3 x 10    EXT     Reverse Flys     Serratus     Horizontal Abd with ER     Biceps 3 x 10 Start 1/24   Triceps     Retraction Prone 10 sec x 10 Start 2/18 Cable Column/Theraband     External Rotation     Internal Rotation     Shrugs     Lats     Ext     Flex     Scapular Retraction     BIC     TRIC 3 x 10; silver band Start 2/22   PNF          Dynamic Stability          Plyoback          Manual interventions     PROM 8 min Start 2/15              Therapeutic Exercise and NMR EXR  [x] (65880) Provided verbal/tactile cueing for activities related to strengthening, flexibility, endurance, ROM  for improvements in scapular, scapulothoracic and UE control with self care, reaching, carrying, lifting, house/yardwork, driving/computer work.    [] (37157) Provided verbal/tactile cueing for activities related to improving balance, coordination, kinesthetic sense, posture, motor skill, proprioception  to assist with  scapular, scapulothoracic and UE control with self care, reaching, carrying, lifting, house/yardwork, driving/computer work. Therapeutic Activities:    [] (02717 or 73888) Provided verbal/tactile cueing for activities related to improving balance, coordination, kinesthetic sense, posture, motor skill, proprioception and motor activation to allow for proper function of scapular, scapulothoracic and UE control with self care, carrying, lifting, driving/computer work.      Home Exercise Program:    [x] (67556) Reviewed/Progressed HEP activities related to strengthening, flexibility, endurance, ROM of scapular, scapulothoracic and UE control with self care, reaching, carrying, lifting, house/yardwork, driving/computer work  [] (65313) Reviewed/Progressed HEP activities related to improving balance, coordination, kinesthetic sense, posture, motor skill, proprioception of scapular, scapulothoracic and UE control with self care, reaching, carrying, lifting, house/yardwork, driving/computer work      Manual Treatments:  PROM / STM / Oscillations-Mobs:  G-I, II, III, IV (PA's, Inf., Post.)  [x] (18288) Provided manual therapy to mobilize soft tissue/joints of cervical/CT, scapular GHJ and UE for the purpose of modulating pain, promoting relaxation,  increasing ROM, reducing/eliminating soft tissue swelling/inflammation/restriction, improving soft tissue extensibility and allowing for proper ROM for normal function with self care, reaching, carrying, lifting, house/yardwork, driving/computer work    Modalities:  CP 15 min  2/22    Charges:  Timed Code Treatment Minutes: 40   Total Treatment Minutes: 55  8:30-9:25       [] EVAL (LOW) 17242 (typically 20 minutes face-to-face)  [] EVAL (MOD) 34257 (typically 30 minutes face-to-face)  [] EVAL (HIGH) 53280 (typically 45 minutes face-to-face)  [] RE-EVAL     [x] DK(63796) x 2    [] IONTO  [x] NMR (16592) x 1     [] VASO  [] Manual (91028) x       [] Other:  [] TA x      [] Mech Traction (90554)  [] ES(attended) (23987)      [] ES (un) (07525):     GOALS:  Patient stated goal: Return to using L UE  [] Progressing: [] Met: [] Not Met: [] Adjusted    Therapist goals for Patient:   Short Term Goals: To be achieved in: 2 weeks  1. Independent in HEP and progression per patient tolerance, in order to prevent re-injury. [] Progressing: [] Met: [] Not Met: [] Adjusted   2. Patient will have a decrease in pain to facilitate improvement in movement, function, and ADLs as indicated by Functional Deficits. [] Progressing: [] Met: [] Not Met: [] Adjusted    Long Term Goals: To be achieved in: 16 weeks  1. Disability index score of 25% or less for the UEFS to assist with reaching prior level of function. [] Progressing: [] Met: [] Not Met: [] Adjusted  2. Patient will demonstrate increased AROM to WNL to allow for proper joint functioning as indicated by patients Functional Deficits. [] Progressing: [] Met: [] Not Met: [] Adjusted  3. Patient will demonstrate an increase in strength to good scapular and core control  for UE to allow for proper functional mobility as indicated by patients Functional Deficits.    [] Progressing: [] Met: [] Not Met: [] Adjusted  4. Patient will return to Independent functional activities without increased symptoms or restriction. [] Progressing: [] Met: [] Not Met: [] Adjusted  5. Patient will report being able to ride motorcycle with no increase in pain. [] Progressing: [] Met: [] Not Met: [] Adjusted     Overall Progression Towards Functional goals/ Treatment Progress Update:  [] Patient is progressing as expected towards functional goals listed. [] Progression is slowed due to complexities/Impairments listed. [] Progression has been slowed due to co-morbidities. [x] Plan just implemented, too soon to assess goals progression <30days   [] Goals require adjustment due to lack of progress  [] Patient is not progressing as expected and requires additional follow up with physician  [] Other    Prognosis for POC: [x] Good [] Fair  [] Poor      Patient requires continued skilled intervention: [x] Yes  [] No    Treatment/Activity Tolerance:  [x] Patient able to complete treatment  [] Patient limited by fatigue  [] Patient limited by pain     [] Patient limited by other medical complications  [x] Other: 2/22 Patient tolerated treatment well this session. Reports of increased soreness with wall walks. Progressing well at this time. No reports of pain with addition of triceps with the band. Continue to progress as tolerated. Patient Education:                  PLAN: See eval  [x] Continue per plan of care [] Alter current plan (see comments above)  [] Plan of care initiated [] Hold pending MD visit [] Discharge      Electronically signed by:  Chong Cooper PT, DPT    Note: If patient does not return for scheduled/ recommended follow up visits, this note will serve as a discharge from care along with most recent update on progress.

## 2022-03-01 ENCOUNTER — HOSPITAL ENCOUNTER (OUTPATIENT)
Dept: PHYSICAL THERAPY | Age: 67
Setting detail: THERAPIES SERIES
Discharge: HOME | End: 2022-03-01
Payer: COMMERCIAL

## 2022-03-01 PROCEDURE — 97112 NEUROMUSCULAR REEDUCATION: CPT

## 2022-03-01 PROCEDURE — 97110 THERAPEUTIC EXERCISES: CPT

## 2022-03-01 NOTE — FLOWSHEET NOTE
[address], [address] [employer] [address] [place] [address], [address] [place] [address]  Phone: [phone]   Fax:     [phone]    Physical Therapy Daily Treatment Note  Date:  3/1/2022    Patient Name:  Davidson Espinoza    :  1955  MRN: [id number]  Restrictions/Precautions:    Medical/Treatment Diagnosis Information:  Diagnosis: M75.122 Complete Rotator Cuff tear or rupture of left shoulder  Treatment Diagnosis: M25.512 Left Shoulder Pain  Insurance/Certification information:  PT Insurance Information: [facility]  Physician Information:  Referring Practitioner: Acacia Flores DO  Has the plan of care been signed (Y/N):        []  Yes  [x]  No     Date of Patient follow up with Physician:       Is this a Progress Report:     []  Yes  [x]  No        If Yes:  Date Range for reporting period:  Beginning 22  Ending 22     Progress report will be due (10 Rx or 30 days whichever is less): 04       Recertification will be due (POC Duration  / 90 days whichever is less):22         Visit # Insurance Allowable Auth Required   9  3/1 TBD [x]  Yes []  No        Functional Scale: UEFS: 76.25% deficit   Date assessed: 22     Latex Allergy:  [x]NO      []YES  Preferred Language for Healthcare:   [x]English       []other:    Pain level:  0-4/10 3/1    SUBJECTIVE:  3/1 Patient states that he is doing pretty well. He states that he still gets some pain at night or when he moves a certain way.      OBJECTIVE:   ROM    *Not tested due to recent surgery PROM AROM  Comment    L R L R    Flexion 90       Abduction        ER        IR        Other  (cervical)        Other             Strength      *Not tested due to recent surgery L R Comment   Flexion      Abduction      ER      IR      Supraspinatus      Upper Trap      Lower Trap      Mid Trap      Rhomboids      Biceps      Triceps      Horizontal Abduction      Horizontal Adduction      Lats Special Tests  1/17  *Not tested due to recent surgery Results/Comment   Jeannine    Mp    [place]    OBriens    Apprehension     Load & Shift         Reflexes/Sensation: 1/17              [x]Dermatomes/Myotomes intact               []Reflexes equal and normal bilaterally               []Other:   `  Joint mobility: 1/17              [x]Normal               []Hypo              []Hyper     Palpation: Denies TTP 1/17     Functional Mobility/Transfers: Unable to use L UE secondary to surgical procedure and post-operative restrictions and protocol  1/17     Posture: WNL; wearing sling  1/17     Gait: (include devices/WB status): WNL  1/17                       [x] Patient history, allergies, meds reviewed. Medical chart reviewed. See intake form. 1/17     Review Of Systems (ROS):   [x]Performed Review of systems (Integumentary, CardioPulmonary, Neurological) by intake and observation. Intake form has been scanned into medical record. Patient has been instructed to contact their primary care physician regarding ROS issues if not already being addressed at this time.   1/17     RESTRICTIONS/PRECAUTIONS: L RCR 1/7/22    Exercises/Interventions:   Exercises:  Exercise/Equipment Resistance/Repetitions Other comments   Stretching/PROM     Wand     Table Slides (flexsion and scaption) 10 sec x 10 ^1/24   Wall walks 10 sec x 10 Start 2/18   IR BB 10 sec x 10 Start 3/1   UE [place]     UE Assisted supine OH flexion 10 sec x 10 Start 2/8   Pulleys 10 sec x 10 Start 2/15   ER at 45 (supine) 10 sec x 10 ^3/1   Pendulum 30x each direction Start 2/8   UE Hang 10 sec x 10    UT stretch 30 sec x 3    Isometrics     Retraction      30x    Weight shift     Flexion     Abduction     External Rotation     Internal Rotation     Biceps     Triceps          PRE's     Flexion     Abduction     External Rotation     Internal Rotation     Shrugs 3 x 10    EXT     Reverse Flys     Serratus     Horizontal Abd with ER     Biceps 3 x 10 Start 1/24   Triceps     Retraction Prone 10 sec x 10 Start 2/18        Cable Column/Theraband     External Rotation     Internal Rotation     Shrugs     Lats     Ext     Flex     Scapular Retraction 3 x 10; green tband Start 3/1   BIC     TRIC 3 x 10; silver band Start 2/22   PNF          Dynamic Stability          Plyoback          Manual interventions                Therapeutic Exercise and NMR EXR  [x] (98160) Provided verbal/tactile cueing for activities related to strengthening, flexibility, endurance, ROM  for improvements in scapular, scapulothoracic and UE control with self care, reaching, carrying, lifting, house/yardwork, driving/computer work.    [] (63884) Provided verbal/tactile cueing for activities related to improving balance, coordination, kinesthetic sense, posture, motor skill, proprioception  to assist with  scapular, scapulothoracic and UE control with self care, reaching, carrying, lifting, house/yardwork, driving/computer work. Therapeutic Activities:    [] (27126 or 14846) Provided verbal/tactile cueing for activities related to improving balance, coordination, kinesthetic sense, posture, motor skill, proprioception and motor activation to allow for proper function of scapular, scapulothoracic and UE control with self care, carrying, lifting, driving/computer work.      Home Exercise Program:    [x] (44030) Reviewed/Progressed HEP activities related to strengthening, flexibility, endurance, ROM of scapular, scapulothoracic and UE control with self care, reaching, carrying, lifting, house/yardwork, driving/computer work  [] (16102) Reviewed/Progressed HEP activities related to improving balance, coordination, kinesthetic sense, posture, motor skill, proprioception of scapular, scapulothoracic and UE control with self care, reaching, carrying, lifting, house/yardwork, driving/computer work      Manual Treatments:  PROM / STM / Oscillations-Mobs:  G-I, II, III, IV (PA's, Inf., Post.)  [x] (00565) Provided manual therapy to mobilize soft tissue/joints of cervical/CT, scapular GHJ and UE for the purpose of modulating pain, promoting relaxation,  increasing ROM, reducing/eliminating soft tissue swelling/inflammation/restriction, improving soft tissue extensibility and allowing for proper ROM for normal function with self care, reaching, carrying, lifting, house/yardwork, driving/computer work    Modalities:  CP 15 min  2/22    Charges:  Timed Code Treatment Minutes: 40   Total Treatment Minutes: 55  8:30-9:25       [] EVAL (LOW) 49209 (typically 20 minutes face-to-face)  [] EVAL (MOD) 54622 (typically 30 minutes face-to-face)  [] EVAL (HIGH) 79351 (typically 45 minutes face-to-face)  [] RE-EVAL     [x] OQ(99020) x 2    [] IONTO  [x] NMR (86987) x 1     [] VASO  [] Manual (08943) x       [] Other:  [] TA x      [] Mech Traction (38115)  [] ES(attended) (11367)      [] ES (un) (30550):     GOALS:  Patient stated goal: Return to using L UE  [] Progressing: [] Met: [] Not Met: [] Adjusted    Therapist goals for Patient:   Short Term Goals: To be achieved in: 2 weeks  1. Independent in HEP and progression per patient tolerance, in order to prevent re-injury. [] Progressing: [] Met: [] Not Met: [] Adjusted   2. Patient will have a decrease in pain to facilitate improvement in movement, function, and ADLs as indicated by Functional Deficits. [] Progressing: [] Met: [] Not Met: [] Adjusted    Long Term Goals: To be achieved in: 16 weeks  1. Disability index score of 25% or less for the UEFS to assist with reaching prior level of function. [] Progressing: [] Met: [] Not Met: [] Adjusted  2. Patient will demonstrate increased AROM to WNL to allow for proper joint functioning as indicated by patients Functional Deficits. [] Progressing: [] Met: [] Not Met: [] Adjusted  3.  Patient will demonstrate an increase in strength to good scapular and core control  for UE to allow for proper functional mobility as indicated by patients Functional Deficits. [] Progressing: [] Met: [] Not Met: [] Adjusted  4. Patient will return to Independent functional activities without increased symptoms or restriction. [] Progressing: [] Met: [] Not Met: [] Adjusted  5. Patient will report being able to ride motorcycle with no increase in pain. [] Progressing: [] Met: [] Not Met: [] Adjusted     Overall Progression Towards Functional goals/ Treatment Progress Update:  [] Patient is progressing as expected towards functional goals listed. [] Progression is slowed due to complexities/Impairments listed. [] Progression has been slowed due to co-morbidities. [x] Plan just implemented, too soon to assess goals progression <30days   [] Goals require adjustment due to lack of progress  [] Patient is not progressing as expected and requires additional follow up with physician  [] Other    Prognosis for POC: [x] Good [] Fair  [] Poor      Patient requires continued skilled intervention: [x] Yes  [] No    Treatment/Activity Tolerance:  [x] Patient able to complete treatment  [] Patient limited by fatigue  [] Patient limited by pain     [] Patient limited by other medical complications  [x] Other: 3/1 Patient tolerated treatment well this session. Reports of increased soreness after exercises. Progressing well at this time. Continue to progress as tolerated. Patient Education:                  PLAN: See eval  [x] Continue per plan of care [] Alter current plan (see comments above)  [] Plan of care initiated [] Hold pending MD visit [] Discharge      Electronically signed by:  Charisma Schaefer PT, DPT    Note: If patient does not return for scheduled/ recommended follow up visits, this note will serve as a discharge from care along with most recent update on progress.

## 2022-03-04 ENCOUNTER — HOSPITAL ENCOUNTER (OUTPATIENT)
Dept: PHYSICAL THERAPY | Age: 67
Setting detail: THERAPIES SERIES
Discharge: HOME | End: 2022-03-04
Payer: COMMERCIAL

## 2022-03-04 PROCEDURE — 97112 NEUROMUSCULAR REEDUCATION: CPT

## 2022-03-04 PROCEDURE — 97110 THERAPEUTIC EXERCISES: CPT

## 2022-03-04 NOTE — FLOWSHEET NOTE
The [facility] and [address] [place] [address], [facility] [address] [facility], [address] [place] [address]  Phone: [phone]   Fax:     [phone]    Physical Therapy Daily Treatment Note  Date:  3/4/2022    Patient Name:  Juan Luis Carrizales    :  1955  MRN: [id number]  Restrictions/Precautions:    Medical/Treatment Diagnosis Information:  Diagnosis: M75.122 Complete Rotator Cuff tear or rupture of left shoulder  Treatment Diagnosis: M25.512 Left Shoulder Pain  Insurance/Certification information:  PT Insurance Information: SamiraLynne Smithjose danielvignesh Angelmichael 150  Physician Information:  Referring Practitioner: Fernando Brito DO  Has the plan of care been signed (Y/N):        []  Yes  [x]  No     Date of Patient follow up with Physician:       Is this a Progress Report:     []  Yes  [x]  No        If Yes:  Date Range for reporting period:  Beginning 22  Ending 22     Progress report will be due (10 Rx or 30 days whichever is less): 4/15/61       Recertification will be due (POC Duration  / 90 days whichever is less):22         Visit # Insurance Allowable Auth Required   10  3/4 TBD [x]  Yes []  No        Functional Scale: UEFS: 76.25% deficit   Date assessed: 22     Latex Allergy:  [x]NO      []YES  Preferred Language for Healthcare:   [x]English       []other:    Pain level:  0/10 3/4    SUBJECTIVE:  3/4 Patient states that he is doing pretty well. He states that he felt pretty good after his last session.      OBJECTIVE:   ROM    *Not tested due to recent surgery PROM AROM  Comment    L R L R    Flexion 90       Abduction        ER        IR        Other  (cervical)        Other             Strength      *Not tested due to recent surgery L R Comment   Flexion      Abduction      ER      IR      Supraspinatus      Upper Trap      Lower Trap      Mid Trap      Rhomboids      Biceps      Triceps      Horizontal Abduction      Horizontal Adduction      Lats        Special Tests    *Not tested due to recent surgery Results/Comment   Jeannine    Mp    Speeds    OBriens    Apprehension     Load & Shift         Reflexes/Sensation: 1/17              [x]Dermatomes/Myotomes intact               []Reflexes equal and normal bilaterally               []Other:   `  Joint mobility: 1/17              [x]Normal               []Hypo              []Hyper     Palpation: Denies TTP 1/17     Functional Mobility/Transfers: Unable to use L UE secondary to surgical procedure and post-operative restrictions and protocol  1/17     Posture: WNL; wearing sling  1/17     Gait: (include devices/WB status): WNL  1/17                       [x] Patient history, allergies, meds reviewed. Medical chart reviewed. See intake form. 1/17     Review Of Systems (ROS):   [x]Performed Review of systems (Integumentary, CardioPulmonary, Neurological) by intake and observation. Intake form has been scanned into medical record. Patient has been instructed to contact their primary care physician regarding ROS issues if not already being addressed at this time.   1/17     RESTRICTIONS/PRECAUTIONS: L RCR 1/7/22    Exercises/Interventions:   Exercises:  Exercise/Equipment Resistance/Repetitions Other comments   Stretching/PROM     Wand     Table Slides (flexsion and scaption) 10 sec x 10 ^1/24   Wall walks 10 sec x 10 Start 2/18   IR BB 10 sec x 10 Start 3/1   UE [place]     UE Assisted supine OH flexion 10 sec x 10 Start 2/8   Pulleys 10 sec x 10 Start 2/15   ER at 90 (supine) 10 sec x 10 ^3/4   Pendulum 30x each direction Start 2/8   UE Hang 10 sec x 10    UT stretch 30 sec x 3    Isometrics     Retraction      30x    Weight shift     Flexion     Abduction     External Rotation     Internal Rotation     Biceps     Triceps          PRE's     Flexion 3 x 10; 1# Start 3/4   Abduction     External Rotation     Internal Rotation     Shrugs 3 x 10    EXT     Reverse Flys     Serratus     Chest press 3 x 10; 4# Start 3/4   Horizontal Abd with ER     Biceps 3 x 10 Start 1/24   Triceps 3 x 10; 4# Start 3/4   Retraction Prone 10 sec x 10 Start 2/18   Prone rows 3 x 10; 5# Start 3/4   Cable Column/Theraband     External Rotation     Internal Rotation     Shrugs     Lats     Ext 3 x 10; black tband ^3/4   Flex     Scapular Retraction 3 x 10; black tband ^3/4   BIC     TRIC 3 x 10; silver band Start 2/22   PNF          Dynamic Stability          Plyoback          Manual interventions                Therapeutic Exercise and NMR EXR  [x] (69555) Provided verbal/tactile cueing for activities related to strengthening, flexibility, endurance, ROM  for improvements in scapular, scapulothoracic and UE control with self care, reaching, carrying, lifting, house/yardwork, driving/computer work.    [] (88948) Provided verbal/tactile cueing for activities related to improving balance, coordination, kinesthetic sense, posture, motor skill, proprioception  to assist with  scapular, scapulothoracic and UE control with self care, reaching, carrying, lifting, house/yardwork, driving/computer work. Therapeutic Activities:    [] (50242 or 98729) Provided verbal/tactile cueing for activities related to improving balance, coordination, kinesthetic sense, posture, motor skill, proprioception and motor activation to allow for proper function of scapular, scapulothoracic and UE control with self care, carrying, lifting, driving/computer work.      Home Exercise Program:    [x] (68945) Reviewed/Progressed HEP activities related to strengthening, flexibility, endurance, ROM of scapular, scapulothoracic and UE control with self care, reaching, carrying, lifting, house/yardwork, driving/computer work  [] (55174) Reviewed/Progressed HEP activities related to improving balance, coordination, kinesthetic sense, posture, motor skill, proprioception of scapular, scapulothoracic and UE control with self care, reaching, carrying, lifting, house/yardwork, driving/computer work Manual Treatments:  PROM / STM / Oscillations-Mobs:  G-I, II, III, IV (PA's, Inf., Post.)  [x] (62333) Provided manual therapy to mobilize soft tissue/joints of cervical/CT, scapular GHJ and UE for the purpose of modulating pain, promoting relaxation,  increasing ROM, reducing/eliminating soft tissue swelling/inflammation/restriction, improving soft tissue extensibility and allowing for proper ROM for normal function with self care, reaching, carrying, lifting, house/yardwork, driving/computer work    Modalities:  CP 10 min 3/4    Charges:  Timed Code Treatment Minutes: 40   Total Treatment Minutes: 50  8:30-9:20       [] EVAL (LOW) 98082 (typically 20 minutes face-to-face)  [] EVAL (MOD) 66380 (typically 30 minutes face-to-face)  [] EVAL (HIGH) 52956 (typically 45 minutes face-to-face)  [] RE-EVAL     [x] NZ(27219) x 2    [] IONTO  [x] NMR (47851) x 1     [] VASO  [] Manual (89718) x       [] Other:  [] TA x      [] Mech Traction (96227)  [] ES(attended) (30392)      [] ES (un) (56224):     GOALS:  Patient stated goal: Return to using L UE  [] Progressing: [] Met: [] Not Met: [] Adjusted    Therapist goals for Patient:   Short Term Goals: To be achieved in: 2 weeks  1. Independent in HEP and progression per patient tolerance, in order to prevent re-injury. [] Progressing: [] Met: [] Not Met: [] Adjusted   2. Patient will have a decrease in pain to facilitate improvement in movement, function, and ADLs as indicated by Functional Deficits. [] Progressing: [] Met: [] Not Met: [] Adjusted    Long Term Goals: To be achieved in: 16 weeks  1. Disability index score of 25% or less for the UEFS to assist with reaching prior level of function. [] Progressing: [] Met: [] Not Met: [] Adjusted  2. Patient will demonstrate increased AROM to WNL to allow for proper joint functioning as indicated by patients Functional Deficits. [] Progressing: [] Met: [] Not Met: [] Adjusted  3.  Patient will demonstrate an increase in strength to good scapular and core control  for UE to allow for proper functional mobility as indicated by patients Functional Deficits. [] Progressing: [] Met: [] Not Met: [] Adjusted  4. Patient will return to Independent functional activities without increased symptoms or restriction. [] Progressing: [] Met: [] Not Met: [] Adjusted  5. Patient will report being able to ride motorcycle with no increase in pain. [] Progressing: [] Met: [] Not Met: [] Adjusted     Overall Progression Towards Functional goals/ Treatment Progress Update:  [] Patient is progressing as expected towards functional goals listed. [] Progression is slowed due to complexities/Impairments listed. [] Progression has been slowed due to co-morbidities. [x] Plan just implemented, too soon to assess goals progression <30days   [] Goals require adjustment due to lack of progress  [] Patient is not progressing as expected and requires additional follow up with physician  [] Other    Prognosis for POC: [x] Good [] Fair  [] Poor      Patient requires continued skilled intervention: [x] Yes  [] No    Treatment/Activity Tolerance:  [x] Patient able to complete treatment  [] Patient limited by fatigue  [] Patient limited by pain     [] Patient limited by other medical complications  [x] Other: 3/4 Patient tolerated treatment well this session. No adverse reactions to addition of weights. Progressing well at this time. Continue to progress as tolerated. Patient Education:                  PLAN: See eval  [x] Continue per plan of care [] Alter current plan (see comments above)  [] Plan of care initiated [] Hold pending MD visit [] Discharge      Electronically signed by:  Feliciano Roblero, PT, DPT    Note: If patient does not return for scheduled/ recommended follow up visits, this note will serve as a discharge from care along with most recent update on progress.

## 2022-03-07 ENCOUNTER — APPOINTMENT (OUTPATIENT)
Dept: PHYSICAL THERAPY | Age: 67
End: 2022-03-07
Payer: COMMERCIAL

## 2022-03-09 ENCOUNTER — HOSPITAL ENCOUNTER (OUTPATIENT)
Dept: PHYSICAL THERAPY | Age: 67
Setting detail: THERAPIES SERIES
Discharge: HOME | End: 2022-03-09
Payer: COMMERCIAL

## 2022-03-09 PROCEDURE — 97530 THERAPEUTIC ACTIVITIES: CPT

## 2022-03-09 PROCEDURE — 97110 THERAPEUTIC EXERCISES: CPT

## 2022-03-09 NOTE — FLOWSHEET NOTE
The [facility] and [address] [facility] [address], [address] [place] [address]  Phone: [phone]   Fax:     [phone]    Physical Therapy Daily Treatment Note  Date:  3/9/2022    Patient Name:  Katarina Abrams    :  1955  MRN: [id number]  Restrictions/Precautions:    Medical/Treatment Diagnosis Information:  Diagnosis: M75.122 Complete Rotator Cuff tear or rupture of left shoulder  Treatment Diagnosis: M25.512 Left Shoulder Pain  Insurance/Certification information:  PT Insurance Information: Obdulio Andrade  Physician Information:  Referring Practitioner: Kinsey Hill DO  Has the plan of care been signed (Y/N):        []  Yes  [x]  No     Date of Patient follow up with Physician:       Is this a Progress Report:     []  Yes  [x]  No        If Yes:  Date Range for reporting period:  Beginning 22  Ending 22     Progress report will be due (10 Rx or 30 days whichever is less): 3/73/01       Recertification will be due (POC Duration  / 90 days whichever is less):22         Visit # Insurance Allowable Auth Required   11  3/9 TBD [x]  Yes []  No        Functional Scale: UEFS: 76.25% deficit   Date assessed: 22     Latex Allergy:  [x]NO      []YES  Preferred Language for Healthcare:   [x]English       []other:    Pain level:  0/10 3/9    SUBJECTIVE:  3/9 Patient states that he didn't sleep well last night. He states otherwise he is doing pretty well.       OBJECTIVE:   ROM    *Not tested due to recent surgery PROM AROM  Comment    L R L R    Flexion 90       Abduction        ER        IR        Other  (cervical)        Other             Strength      *Not tested due to recent surgery L R Comment   Flexion      Abduction      ER      IR      Supraspinatus      Upper Trap      Lower Trap      Mid Trap      Rhomboids      Biceps      Triceps      Horizontal Abduction      Horizontal Adduction      Lats        Special Tests    *Not tested due to recent surgery Results/Comment   Jeannine    Mp    Speeds    OBriens    Apprehension     Load & Shift         Reflexes/Sensation: 1/17              [x]Dermatomes/Myotomes intact               []Reflexes equal and normal bilaterally               []Other:   `  Joint mobility: 1/17              [x]Normal               []Hypo              []Hyper     Palpation: Denies TTP 1/17     Functional Mobility/Transfers: Unable to use L UE secondary to surgical procedure and post-operative restrictions and protocol  1/17     Posture: WNL; wearing sling  1/17     Gait: (include devices/WB status): WNL  1/17                       [x] Patient history, allergies, meds reviewed. Medical chart reviewed. See intake form. 1/17     Review Of Systems (ROS):   [x]Performed Review of systems (Integumentary, CardioPulmonary, Neurological) by intake and observation. Intake form has been scanned into medical record. Patient has been instructed to contact their primary care physician regarding ROS issues if not already being addressed at this time.   1/17     RESTRICTIONS/PRECAUTIONS: L RCR 1/7/22    Exercises/Interventions:   Exercises:  Exercise/Equipment Resistance/Repetitions Other comments   Stretching/PROM     Wand     Table Slides (flexsion and scaption) 10 sec x 10 ^1/24   Wall walks 10 sec x 10 Start 2/18   IR BB 10 sec x 10 Start 3/1   UE [place]     UE Assisted supine OH flexion 10 sec x 10 Start 2/8   Pulleys 10 sec x 10 Start 2/15   ER at 90 (supine) 10 sec x 10 ^3/4   Pendulum 30x each direction Start 2/8   UE Hang 10 sec x 10    UT stretch 30 sec x 3    Isometrics     Retraction      30x    Weight shift     Flexion     Abduction     External Rotation     Internal Rotation     Biceps     Triceps          PRE's     Flexion 3 x 10; 5# ^3/9   Abduction     External Rotation     Internal Rotation     Shrugs 3 x 10    EXT     Reverse Flys     Serratus     Chest press 3 x 10; 4# Start 3/4   Horizontal Abd with ER     Biceps 3 x 10 Start 1/24   Triceps 3 x 10; 4# Start 3/4   Retraction Prone 10 sec x 10 Start 2/18   Prone rows 3 x 10; 5# Start 3/4   Cable Column/Theraband     External Rotation     Internal Rotation     Shrugs     Lats     Ext 3 x 10; black tband ^3/4   Flex     Scapular Retraction 3 x 10; black tband ^3/4   BIC     TRIC 3 x 10; silver band Start 2/22   PNF          Dynamic Stability          [place]          Manual interventions                Therapeutic Exercise and NMR EXR  [x] (20755) Provided verbal/tactile cueing for activities related to strengthening, flexibility, endurance, ROM  for improvements in scapular, scapulothoracic and UE control with self care, reaching, carrying, lifting, house/yardwork, driving/computer work.    [] (52103) Provided verbal/tactile cueing for activities related to improving balance, coordination, kinesthetic sense, posture, motor skill, proprioception  to assist with  scapular, scapulothoracic and UE control with self care, reaching, carrying, lifting, house/yardwork, driving/computer work. Therapeutic Activities:    [] (17280 or 88865) Provided verbal/tactile cueing for activities related to improving balance, coordination, kinesthetic sense, posture, motor skill, proprioception and motor activation to allow for proper function of scapular, scapulothoracic and UE control with self care, carrying, lifting, driving/computer work.      Home Exercise Program:    [x] (18742) Reviewed/Progressed HEP activities related to strengthening, flexibility, endurance, ROM of scapular, scapulothoracic and UE control with self care, reaching, carrying, lifting, house/yardwork, driving/computer work  [] (68600) Reviewed/Progressed HEP activities related to improving balance, coordination, kinesthetic sense, posture, motor skill, proprioception of scapular, scapulothoracic and UE control with self care, reaching, carrying, lifting, house/yardwork, driving/computer work      Manual Treatments:  PROM / STM / Oscillations-Mobs:  G-I, II, III, IV (PA's, Inf., Post.)  [x] (67860) Provided manual therapy to mobilize soft tissue/joints of cervical/CT, scapular GHJ and UE for the purpose of modulating pain, promoting relaxation,  increasing ROM, reducing/eliminating soft tissue swelling/inflammation/restriction, improving soft tissue extensibility and allowing for proper ROM for normal function with self care, reaching, carrying, lifting, house/yardwork, driving/computer work    Modalities:  CP 10 min 3/4    Charges:  Timed Code Treatment Minutes: 28   Total Treatment Minutes: 30  8:30-9:00       [] EVAL (LOW) 11361 (typically 20 minutes face-to-face)  [] EVAL (MOD) 43847 (typically 30 minutes face-to-face)  [] EVAL (HIGH) 22054 (typically 45 minutes face-to-face)  [] RE-EVAL     [x] DS(79224) x 1    [] IONTO  [x] NMR (95418) x 1     [] VASO  [] Manual (43502) x       [] Other:  [] TA x      [] Mech Traction (23516)  [] ES(attended) (29727)      [] ES (un) (50586):     GOALS:  Patient stated goal: Return to using L UE  [] Progressing: [] Met: [] Not Met: [] Adjusted    Therapist goals for Patient:   Short Term Goals: To be achieved in: 2 weeks  1. Independent in HEP and progression per patient tolerance, in order to prevent re-injury. [] Progressing: [] Met: [] Not Met: [] Adjusted   2. Patient will have a decrease in pain to facilitate improvement in movement, function, and ADLs as indicated by Functional Deficits. [] Progressing: [] Met: [] Not Met: [] Adjusted    Long Term Goals: To be achieved in: 16 weeks  1. Disability index score of 25% or less for the UEFS to assist with reaching prior level of function. [] Progressing: [] Met: [] Not Met: [] Adjusted  2. Patient will demonstrate increased AROM to WNL to allow for proper joint functioning as indicated by patients Functional Deficits. [] Progressing: [] Met: [] Not Met: [] Adjusted  3.  Patient will demonstrate an increase in strength to good scapular and core control  for UE to allow for proper functional mobility as indicated by patients Functional Deficits. [] Progressing: [] Met: [] Not Met: [] Adjusted  4. Patient will return to Independent functional activities without increased symptoms or restriction. [] Progressing: [] Met: [] Not Met: [] Adjusted  5. Patient will report being able to ride motorcycle with no increase in pain. [] Progressing: [] Met: [] Not Met: [] Adjusted     Overall Progression Towards Functional goals/ Treatment Progress Update:  [] Patient is progressing as expected towards functional goals listed. [] Progression is slowed due to complexities/Impairments listed. [] Progression has been slowed due to co-morbidities. [x] Plan just implemented, too soon to assess goals progression <30days   [] Goals require adjustment due to lack of progress  [] Patient is not progressing as expected and requires additional follow up with physician  [] Other    Prognosis for POC: [x] Good [] Fair  [] Poor      Patient requires continued skilled intervention: [x] Yes  [] No    Treatment/Activity Tolerance:  [x] Patient able to complete treatment  [] Patient limited by fatigue  [] Patient limited by pain     [] Patient limited by other medical complications  [x] Other: 3/9 Patient tolerated treatment well this session. No adverse reactions to addition of weights. Progressing well at this time. Continue to progress as tolerated. Patient Education:                  PLAN: See eval  [x] Continue per plan of care [] Alter current plan (see comments above)  [] Plan of care initiated [] Hold pending MD visit [] Discharge      Electronically signed by:  Tanvi Landaverde, PT, DPT    Note: If patient does not return for scheduled/ recommended follow up visits, this note will serve as a discharge from care along with most recent update on progress.

## 2022-03-18 ENCOUNTER — HOSPITAL ENCOUNTER (OUTPATIENT)
Dept: PHYSICAL THERAPY | Age: 67
Setting detail: THERAPIES SERIES
Discharge: HOME | End: 2022-03-18
Payer: COMMERCIAL

## 2022-03-18 PROCEDURE — 97530 THERAPEUTIC ACTIVITIES: CPT

## 2022-03-18 PROCEDURE — 97110 THERAPEUTIC EXERCISES: CPT

## 2022-03-18 NOTE — FLOWSHEET NOTE
The [address] [facility] and [address] [facility], [facility] [address] [facility], [address] [facility]  Phone: [phone]   Fax:     [phone]    Physical Therapy Daily Treatment Note  Date:  3/18/2022    Patient Name:  Vj Marquez    :  1955  MRN: [id number]  Restrictions/Precautions:    Medical/Treatment Diagnosis Information:  Diagnosis: M75.122 Complete Rotator Cuff tear or rupture of left shoulder  Treatment Diagnosis: M25.512 Left Shoulder Pain  Insurance/Certification information:  PT Insurance Information: Shira Ill  Physician Information:  Referring Practitioner: Aletha Benito DO  Has the plan of care been signed (Y/N):        []  Yes  [x]  No     Date of Patient follow up with Physician:       Is this a Progress Report:     []  Yes  [x]  No        If Yes:  Date Range for reporting period:  Beginning 22  Ending 22     Progress report will be due (10 Rx or 30 days whichever is less):        Recertification will be due (POC Duration  / 90 days whichever is less):22         Visit # Insurance Allowable Auth Required   12  3/18 TBD [x]  Yes []  No        Functional Scale: UEFS: 76.25% deficit   Date assessed: 22     Latex Allergy:  [x]NO      []YES  Preferred Language for Healthcare:   [x]English       []other:    Pain level:  0/10 3/18    SUBJECTIVE:  3/18 Patient states that he is doing well.       OBJECTIVE:   ROM    *Not tested due to recent surgery PROM AROM  Comment    L R L R    Flexion 90       Abduction        ER        IR        Other  (cervical)        Other             Strength      *Not tested due to recent surgery L R Comment   Flexion      Abduction      ER      IR      Supraspinatus      Upper Trap      Lower Trap      Mid Trap      Rhomboids      Biceps      Triceps      Horizontal Abduction      Horizontal Adduction      Lats        Special Tests    *Not tested due to recent surgery Results/Comment Jeannine    Neers    Speeds    OBriens    Apprehension     Load & Shift         Reflexes/Sensation: 1/17              [x]Dermatomes/Myotomes intact               []Reflexes equal and normal bilaterally               []Other:   `  Joint mobility: 1/17              [x]Normal               []Hypo              []Hyper     Palpation: Denies TTP 1/17     Functional Mobility/Transfers: Unable to use L UE secondary to surgical procedure and post-operative restrictions and protocol  1/17     Posture: WNL; wearing sling  1/17     Gait: (include devices/WB status): WNL  1/17                       [x] Patient history, allergies, meds reviewed. Medical chart reviewed. See intake form. 1/17     Review Of Systems (ROS):   [x]Performed Review of systems (Integumentary, CardioPulmonary, Neurological) by intake and observation. Intake form has been scanned into medical record. Patient has been instructed to contact their primary care physician regarding ROS issues if not already being addressed at this time.   1/17     RESTRICTIONS/PRECAUTIONS: L RCR 1/7/22    Exercises/Interventions:   Exercises:  Exercise/Equipment Resistance/Repetitions Other comments   Stretching/PROM     Wand     Table Slides (flexsion and scaption) 10 sec x 10 ^1/24   Wall walks 10 sec x 10 Start 2/18   IR BB 10 sec x 10 Start 3/1   UE [place]     UE Assisted supine OH flexion 10 sec x 10 Start 2/8   Pulleys 10 sec x 10 Start 2/15   ER at 90 (supine) 10 sec x 10 ^3/4   Pendulum 30x each direction Start 2/8   UE Hang 10 sec x 10    UT stretch 30 sec x 3    Isometrics     Retraction      30x    Weight shift     Flexion     Abduction     External Rotation     Internal Rotation     Biceps     Triceps          PRE's     Flexion 3 x 10; 5# ^3/9   Abduction     External Rotation 3 x 10; 1# Start 3/18   Internal Rotation     Shrugs 3 x 10    Wall Push-up 3 x 10 Start 3/18   EXT     Reverse Flys     Serratus     Chest press 3 x 10; 6# ^3/18   Horizontal Abd with ER     Biceps 3 x 10 Start 1/24   Triceps 3 x 10; 6# ^3/18   Retraction Prone 10 sec x 10 Start 2/18   Prone rows 3 x 10; 5# Start 3/4   Cable Column/Theraband     External Rotation 3 x 10; green tband Start 3/18   Internal Rotation 3 x 10; blue tband Start 3/18   Shrugs     Lats     Ext 3 x 10; black tband ^3/4   Flex     Scapular Retraction 3 x 10; black tband ^3/4   BIC     TRIC 3 x 10; silver band Start 2/22   PNF          Dynamic Stability          Plyoback          Manual interventions                Therapeutic Exercise and NMR EXR  [x] (92942) Provided verbal/tactile cueing for activities related to strengthening, flexibility, endurance, ROM  for improvements in scapular, scapulothoracic and UE control with self care, reaching, carrying, lifting, house/yardwork, driving/computer work. [x] (39111) Provided verbal/tactile cueing for activities related to improving balance, coordination, kinesthetic sense, posture, motor skill, proprioception  to assist with  scapular, scapulothoracic and UE control with self care, reaching, carrying, lifting, house/yardwork, driving/computer work. Therapeutic Activities:    [x] (82883 or 92979) Provided verbal/tactile cueing for activities related to improving balance, coordination, kinesthetic sense, posture, motor skill, proprioception and motor activation to allow for proper function of scapular, scapulothoracic and UE control with self care, carrying, lifting, driving/computer work.      Home Exercise Program:    [x] (05257) Reviewed/Progressed HEP activities related to strengthening, flexibility, endurance, ROM of scapular, scapulothoracic and UE control with self care, reaching, carrying, lifting, house/yardwork, driving/computer work  [] (00889) Reviewed/Progressed HEP activities related to improving balance, coordination, kinesthetic sense, posture, motor skill, proprioception of scapular, scapulothoracic and UE control with self care, reaching, carrying, lifting, house/yardwork, driving/computer work      Manual Treatments:  PROM / STM / Oscillations-Mobs:  G-I, II, III, IV (PA's, Inf., Post.)  [x] (32435) Provided manual therapy to mobilize soft tissue/joints of cervical/CT, scapular GHJ and UE for the purpose of modulating pain, promoting relaxation,  increasing ROM, reducing/eliminating soft tissue swelling/inflammation/restriction, improving soft tissue extensibility and allowing for proper ROM for normal function with self care, reaching, carrying, lifting, house/yardwork, driving/computer work    Modalities:  CP 10 min 3/18    Charges:  Timed Code Treatment Minutes: 40   Total Treatment Minutes: 50  8:30-9:20       [] EVAL (LOW) 78594 (typically 20 minutes face-to-face)  [] EVAL (MOD) 63509 (typically 30 minutes face-to-face)  [] EVAL (HIGH) 08290 (typically 45 minutes face-to-face)  [] RE-EVAL     [x] JQ(26871) x 2    [] IONTO  [] NMR (65383) x      [] VASO  [] Manual (86984) x       [] Other:  [x] TA x 1      [] Mech Traction (37214)  [] ES(attended) (06758)      [] ES (un) (62131):     GOALS:  Patient stated goal: Return to using L UE  [] Progressing: [] Met: [] Not Met: [] Adjusted    Therapist goals for Patient:   Short Term Goals: To be achieved in: 2 weeks  1. Independent in HEP and progression per patient tolerance, in order to prevent re-injury. [] Progressing: [] Met: [] Not Met: [] Adjusted   2. Patient will have a decrease in pain to facilitate improvement in movement, function, and ADLs as indicated by Functional Deficits. [] Progressing: [] Met: [] Not Met: [] Adjusted    Long Term Goals: To be achieved in: 16 weeks  1. Disability index score of 25% or less for the UEFS to assist with reaching prior level of function. [] Progressing: [] Met: [] Not Met: [] Adjusted  2. Patient will demonstrate increased AROM to WNL to allow for proper joint functioning as indicated by patients Functional Deficits.     [] Progressing: [] Met: [] Not Met: [] Adjusted  3. Patient will demonstrate an increase in strength to good scapular and core control  for UE to allow for proper functional mobility as indicated by patients Functional Deficits. [] Progressing: [] Met: [] Not Met: [] Adjusted  4. Patient will return to Independent functional activities without increased symptoms or restriction. [] Progressing: [] Met: [] Not Met: [] Adjusted  5. Patient will report being able to ride motorcycle with no increase in pain. [] Progressing: [] Met: [] Not Met: [] Adjusted     Overall Progression Towards Functional goals/ Treatment Progress Update:  [] Patient is progressing as expected towards functional goals listed. [] Progression is slowed due to complexities/Impairments listed. [] Progression has been slowed due to co-morbidities. [x] Plan just implemented, too soon to assess goals progression <30days   [] Goals require adjustment due to lack of progress  [] Patient is not progressing as expected and requires additional follow up with physician  [] Other    Prognosis for POC: [x] Good [] Fair  [] Poor      Patient requires continued skilled intervention: [x] Yes  [] No    Treatment/Activity Tolerance:  [x] Patient able to complete treatment  [] Patient limited by fatigue  [] Patient limited by pain     [] Patient limited by other medical complications  [x] Other: 3/18 Patient tolerated treatment well this session. No adverse reactions to addition of weights and resistance. Progressing well at this time. Reported fatigue and soreness at end of session. Continue to progress as tolerated.                    Patient Education:                  PLAN: See eval  [x] Continue per plan of care [] Alter current plan (see comments above)  [] Plan of care initiated [] Hold pending MD visit [] Discharge      Electronically signed by:  Mehran Boothe, PT, DPT    Note: If patient does not return for scheduled/ recommended follow up visits, this note will serve as a discharge from care along with most recent update on progress.

## 2022-03-25 ENCOUNTER — HOSPITAL ENCOUNTER (OUTPATIENT)
Dept: PHYSICAL THERAPY | Age: 67
Setting detail: THERAPIES SERIES
Discharge: HOME | End: 2022-03-25
Payer: COMMERCIAL

## 2022-03-25 PROCEDURE — 97110 THERAPEUTIC EXERCISES: CPT

## 2022-03-25 PROCEDURE — 97530 THERAPEUTIC ACTIVITIES: CPT

## 2022-03-25 NOTE — FLOWSHEET NOTE
a  The [address] [facility] and [address] [facility], [facility] [address], [address] [facility]  Phone: [phone]   Fax:     [phone]    Physical Therapy Daily Treatment Note  Date:  3/25/2022    Patient Name:  Constantino Otero    :  1955  MRN: [id number]  Restrictions/Precautions:    Medical/Treatment Diagnosis Information:  Diagnosis: M75.122 Complete Rotator Cuff tear or rupture of left shoulder  Treatment Diagnosis: M25.512 Left Shoulder Pain  Insurance/Certification information:  PT Insurance Information: SamiraLynne Smithjose danielvignesh Z[address]  Physician Information:  Referring Practitioner: Bhavna Washington DO  Has the plan of care been signed (Y/N):        []  Yes  [x]  No     Date of Patient follow up with Physician:       Is this a Progress Report:     []  Yes  [x]  No        If Yes:  Date Range for reporting period:  Beginning 22  Ending 22     Progress report will be due (10 Rx or 30 days whichever is less):        Recertification will be due (POC Duration  / 90 days whichever is less):22         Visit # Insurance Allowable Auth Required   13  3/25 TBD [x]  Yes []  No        Functional Scale: UEFS: 76.25% deficit   Date assessed: 22     Latex Allergy:  [x]NO      []YES  Preferred Language for Healthcare:   [x]English       []other:    Pain level:  0/10 3/25    SUBJECTIVE:  3/25 Patient states that he is doing well.      OBJECTIVE:   ROM    *Not tested due to recent surgery PROM AROM  Comment    L R L R    Flexion 90       Abduction        ER        IR        Other  (cervical)        Other             Strength      *Not tested due to recent surgery L R Comment   Flexion      Abduction      ER      IR      Supraspinatus      Upper Trap      Lower Trap      Mid Trap      Rhomboids      Biceps      Triceps      Horizontal Abduction      Horizontal Adduction      Lats        Special Tests    *Not tested due to recent surgery Results/Comment   Jeannine Neers    Speeds    OBriens    Apprehension     Load & Shift         Reflexes/Sensation: 1/17              [x]Dermatomes/Myotomes intact               []Reflexes equal and normal bilaterally               []Other:   `  Joint mobility: 1/17              [x]Normal               []Hypo              []Hyper     Palpation: Denies TTP 1/17     Functional Mobility/Transfers: Unable to use L UE secondary to surgical procedure and post-operative restrictions and protocol  1/17     Posture: WNL; wearing sling  1/17     Gait: (include devices/WB status): WNL  1/17                       [x] Patient history, allergies, meds reviewed. Medical chart reviewed. See intake form. 1/17     Review Of Systems (ROS):   [x]Performed Review of systems (Integumentary, CardioPulmonary, Neurological) by intake and observation. Intake form has been scanned into medical record. Patient has been instructed to contact their primary care physician regarding ROS issues if not already being addressed at this time.   1/17     RESTRICTIONS/PRECAUTIONS: L RCR 1/7/22    Exercises/Interventions:   Exercises:  Exercise/Equipment Resistance/Repetitions Other comments   Stretching/PROM     Wand     Table Slides (flexsion and scaption) 10 sec x 10 ^1/24   Wall walks 10 sec x 10 Start 2/18   IR BB 10 sec x 10 Start 3/1   UE [place]     UE Assisted supine OH flexion 10 sec x 10 Start 2/8   Pulleys 10 sec x 10 Start 2/15   ER at 90 (supine) 10 sec x 10 ^3/4   Pendulum 30x each direction Start 2/8   UE Hang 10 sec x 10    UT stretch 30 sec x 3    Isometrics     Retraction      30x    Weight shift     Flexion     Abduction     External Rotation     Internal Rotation     Biceps     Triceps          PRE's     Flexion 3 x 10; 5# ^3/9   Abduction     External Rotation 3 x 10; 2# ^3/25   Internal Rotation 3 x 10; 4# Start 3/25   Shrugs 3 x 10    Wall Push-up 3 x 10 Start 3/18   EXT     Reverse Flys     Serratus     Chest press 3 x 10; 7# ^3/25   Horizontal Abd with ER 3 x 10 Start 3/25   Biceps 3 x 10; 7# ^3/25   Triceps 3 x 10; 7# ^3/25   Retraction Prone 10 sec x 10 Start 2/18   Prone rows 3 x 10; 5# Start 3/4   Cable Column/Theraband     External Rotation 3 x 10; blue tband ^3/25   Internal Rotation 3 x 10; blue tband Start 3/18   Shrugs     Lats     Ext 3 x 10; black tband ^3/4   Flex     Scapular Retraction 3 x 10; black tband ^3/4   BIC     TRIC 3 x 10; silver band Start 2/22   PNF          Dynamic Stability     Ball on the wall 3 x 10 Start 3/25   Plyoback          Manual interventions                Therapeutic Exercise and NMR EXR  [x] (76037) Provided verbal/tactile cueing for activities related to strengthening, flexibility, endurance, ROM  for improvements in scapular, scapulothoracic and UE control with self care, reaching, carrying, lifting, house/yardwork, driving/computer work. [x] (79347) Provided verbal/tactile cueing for activities related to improving balance, coordination, kinesthetic sense, posture, motor skill, proprioception  to assist with  scapular, scapulothoracic and UE control with self care, reaching, carrying, lifting, house/yardwork, driving/computer work. Therapeutic Activities:    [x] (93710 or 38617) Provided verbal/tactile cueing for activities related to improving balance, coordination, kinesthetic sense, posture, motor skill, proprioception and motor activation to allow for proper function of scapular, scapulothoracic and UE control with self care, carrying, lifting, driving/computer work.      Home Exercise Program:    [x] (52814) Reviewed/Progressed HEP activities related to strengthening, flexibility, endurance, ROM of scapular, scapulothoracic and UE control with self care, reaching, carrying, lifting, house/yardwork, driving/computer work  [] (29981) Reviewed/Progressed HEP activities related to improving balance, coordination, kinesthetic sense, posture, motor skill, proprioception of scapular, scapulothoracic and UE control with self care, reaching, carrying, lifting, house/yardwork, driving/computer work      Manual Treatments:  PROM / STM / Oscillations-Mobs:  G-I, II, III, IV (Alv[place], Inf., Post.)  [x] (03800) Provided manual therapy to mobilize soft tissue/joints of cervical/CT, scapular GHJ and UE for the purpose of modulating pain, promoting relaxation,  increasing ROM, reducing/eliminating soft tissue swelling/inflammation/restriction, improving soft tissue extensibility and allowing for proper ROM for normal function with self care, reaching, carrying, lifting, house/yardwork, driving/computer work    Modalities:  CP 10 min 3/18    Charges:  Timed Code Treatment Minutes: 40   Total Treatment Minutes: 55  8:30-9:25       [] EVAL (LOW) 56974 (typically 20 minutes face-to-face)  [] EVAL (MOD) 30860 (typically 30 minutes face-to-face)  [] EVAL (HIGH) 41771 (typically 45 minutes face-to-face)  [] RE-EVAL     [x] KQ(89253) x 2    [] IONTO  [] NMR (23114) x      [] VASO  [] Manual (87423) x       [] Other:  [x] TA x 1      [] Mech Traction (01960)  [] ES(attended) (13712)      [] ES (un) (98354):     GOALS:  Patient stated goal: Return to using L UE  [] Progressing: [] Met: [] Not Met: [] Adjusted    Therapist goals for Patient:   Short Term Goals: To be achieved in: 2 weeks  1. Independent in HEP and progression per patient tolerance, in order to prevent re-injury. [] Progressing: [] Met: [] Not Met: [] Adjusted   2. Patient will have a decrease in pain to facilitate improvement in movement, function, and ADLs as indicated by Functional Deficits. [] Progressing: [] Met: [] Not Met: [] Adjusted    Long Term Goals: To be achieved in: 16 weeks  1. Disability index score of 25% or less for the UEFS to assist with reaching prior level of function. [] Progressing: [] Met: [] Not Met: [] Adjusted  2.  Patient will demonstrate increased AROM to WNL to allow for proper joint functioning as indicated by patients Functional Deficits. [] Progressing: [] Met: [] Not Met: [] Adjusted  3. Patient will demonstrate an increase in strength to good scapular and core control  for UE to allow for proper functional mobility as indicated by patients Functional Deficits. [] Progressing: [] Met: [] Not Met: [] Adjusted  4. Patient will return to Independent functional activities without increased symptoms or restriction. [] Progressing: [] Met: [] Not Met: [] Adjusted  5. Patient will report being able to ride motorcycle with no increase in pain. [] Progressing: [] Met: [] Not Met: [] Adjusted     Overall Progression Towards Functional goals/ Treatment Progress Update:  [] Patient is progressing as expected towards functional goals listed. [] Progression is slowed due to complexities/Impairments listed. [] Progression has been slowed due to co-morbidities. [x] Plan just implemented, too soon to assess goals progression <30days   [] Goals require adjustment due to lack of progress  [] Patient is not progressing as expected and requires additional follow up with physician  [] Other    Prognosis for POC: [x] Good [] Fair  [] Poor      Patient requires continued skilled intervention: [x] Yes  [] No    Treatment/Activity Tolerance:  [x] Patient able to complete treatment  [] Patient limited by fatigue  [] Patient limited by pain     [] Patient limited by other medical complications  [x] Other: 3/25 Patient tolerated treatment well this session. No adverse reactions to addition of weights and resistance. Progressing well at this time. Reported fatigue and soreness at end of session. Continue to progress as tolerated.                    Patient Education:                  PLAN: See eval  [x] Continue per plan of care [] Alter current plan (see comments above)  [] Plan of care initiated [] Hold pending MD visit [] Discharge      Electronically signed by:  Dieter Salinas, PT, DPT    Note: If patient does not return for scheduled/ recommended follow up visits, this note will serve as a discharge from care along with most recent update on progress.

## 2022-04-01 ENCOUNTER — HOSPITAL ENCOUNTER (OUTPATIENT)
Dept: PHYSICAL THERAPY | Age: 67
Setting detail: THERAPIES SERIES
Discharge: HOME | End: 2022-04-01
Payer: COMMERCIAL

## 2022-04-01 PROCEDURE — 97530 THERAPEUTIC ACTIVITIES: CPT

## 2022-04-01 PROCEDURE — 97110 THERAPEUTIC EXERCISES: CPT

## 2022-04-01 NOTE — PROGRESS NOTES
a  [facility] and [address] [facility], [facility] Exam18 [address] , [address] [place] [address]  Phone: [phone]   Fax:     [phone]    Physical Therapy Daily Treatment Note  Date:  2022    Patient Name:  Gallo Webb    :  1955  MRN: [id number]  Restrictions/Precautions:    Medical/Treatment Diagnosis Information:  Diagnosis: M75.122 Complete Rotator Cuff tear or rupture of left shoulder  Treatment Diagnosis: M25.512 Left Shoulder Pain  Insurance/Certification information:  PT Insurance Information: Nohemy [place]  Physician Information:  Referring Practitioner: Keven Berry DO  Has the plan of care been signed (Y/N):        []  Yes  [x]  No     Date of Patient follow up with Physician:       Is this a Progress Report:     []  Yes  [x]  No        If Yes:  Date Range for reporting period:  Beginning 22  Ending 22     Progress report will be due (10 Rx or 30 days whichever is less):        Recertification will be due (POC Duration  / 90 days whichever is less):22         Visit # Insurance Allowable Auth Required   14   TBD [x]  Yes []  No        Functional Scale: UEFS: 13.75% deficit   Date assessed: 22     Latex Allergy:  [x]NO      []YES  Preferred Language for Healthcare:   [x]English       []other:    Pain level:  0/10     SUBJECTIVE:   Patient states that he is doing well.       OBJECTIVE:   ROM     PROM AROM  Comment    L R L R    Flexion   WNL     Abduction   WNL     ER   WNL     IR   WNL     Other  (cervical)        Other             Strength   4 L R Comment   Flexion 4+     Abduction 4+     ER 5     IR 5     Supraspinatus      Upper Trap      Lower Trap      Mid Trap      Rhomboids      Biceps      Triceps      Horizontal Abduction      Horizontal Adduction      Lats        Special Tests     Results/Comment   Jeannine Maurer    OBriens    Apprehension     Load & Shift         Reflexes/Sensation:  [x]Dermatomes/Myotomes intact               []Reflexes equal and normal bilaterally               []Other:   `  Joint mobility: 4/1              [x]Normal               []Hypo              []Hyper     Palpation: Denies TTP 1/17     Functional Mobility/Transfers: Independent  4/1     Posture: WNL 4/1     Gait: (include devices/WB status): WNL  4/1                       [x] Patient history, allergies, meds reviewed. Medical chart reviewed. See intake form. 1/17     Review Of Systems (ROS):   [x]Performed Review of systems (Integumentary, CardioPulmonary, Neurological) by intake and observation. Intake form has been scanned into medical record. Patient has been instructed to contact their primary care physician regarding ROS issues if not already being addressed at this time.   1/17     RESTRICTIONS/PRECAUTIONS: L RCR 1/7/22    Exercises/Interventions:   Exercises:  Exercise/Equipment Resistance/Repetitions Other comments   Stretching/PROM     Wand     Table Slides (flexsion and scaption) 10 sec x 10 ^1/24   Wall walks 10 sec x 10 Start 2/18   IR BB 10 sec x 10 Start 3/1   UE [place]     UE Assisted supine OH flexion 10 sec x 10 Start 2/8   Pulleys 10 sec x 10 Start 2/15   ER at 90 (supine) 10 sec x 10 ^3/4   Pendulum 30x each direction Start 2/8   UE Hang 10 sec x 10    UT stretch 30 sec x 3    Isometrics     Retraction      30x    Weight shift     Flexion     Abduction     External Rotation     Internal Rotation     Biceps     Triceps          PRE's     Flexion 3 x 10; 5# ^3/9   Abduction     External Rotation 3 x 10; 3# ^4/1   Internal Rotation 3 x 10; 7# ^4/1   Shrugs 3 x 10    table Push-up 3 x 10 ^4/1   EXT     Reverse Flys     Serratus     Chest press 3 x 10; 7# ^3/25   Horizontal Abd with ER 3 x 10 Start 3/25   Biceps 3 x 10; 7# ^3/25   Triceps 3 x 10; 7# ^3/25   Retraction Prone 10 sec x 10 Start 2/18   Prone rows 3 x 10; 5# Start 3/4   Cable Column/Theraband     External Rotation 3 x 10; 1 plate ^4/1   Internal Rotation 3 x 10; 3 plate ^6/6   Shrugs     Lats     Ext 3 x 10; 7 plates ^7/4   Flex     Scapular Retraction 3 x 10; 7 plates ^2/2   BIC     TRIC 3 x 10; silver band Start 2/22   PNF          Dynamic Stability     Ball on the wall 3 x 10; 2# ^4/1   Plyoback          Manual interventions                Therapeutic Exercise and NMR EXR  [x] (35591) Provided verbal/tactile cueing for activities related to strengthening, flexibility, endurance, ROM  for improvements in scapular, scapulothoracic and UE control with self care, reaching, carrying, lifting, house/yardwork, driving/computer work. [x] (44272) Provided verbal/tactile cueing for activities related to improving balance, coordination, kinesthetic sense, posture, motor skill, proprioception  to assist with  scapular, scapulothoracic and UE control with self care, reaching, carrying, lifting, house/yardwork, driving/computer work. Therapeutic Activities:    [x] (73122 or 92490) Provided verbal/tactile cueing for activities related to improving balance, coordination, kinesthetic sense, posture, motor skill, proprioception and motor activation to allow for proper function of scapular, scapulothoracic and UE control with self care, carrying, lifting, driving/computer work.      Home Exercise Program:    [x] (78107) Reviewed/Progressed HEP activities related to strengthening, flexibility, endurance, ROM of scapular, scapulothoracic and UE control with self care, reaching, carrying, lifting, house/yardwork, driving/computer work  [] (68675) Reviewed/Progressed HEP activities related to improving balance, coordination, kinesthetic sense, posture, motor skill, proprioception of scapular, scapulothoracic and UE control with self care, reaching, carrying, lifting, house/yardwork, driving/computer work      Manual Treatments:  PROM / STM / Oscillations-Mobs:  G-I, II, III, IV (PA's, Inf., Post.)  [x] (62514) Provided manual therapy to mobilize soft tissue/joints of cervical/CT, scapular GHJ and UE for the purpose of modulating pain, promoting relaxation,  increasing ROM, reducing/eliminating soft tissue swelling/inflammation/restriction, improving soft tissue extensibility and allowing for proper ROM for normal function with self care, reaching, carrying, lifting, house/yardwork, driving/computer work    Modalities:  CP 10 min  4/1    Charges:  Timed Code Treatment Minutes: 40   Total Treatment Minutes: 53  8:30-9:23       [] EVAL (LOW) 49653 (typically 20 minutes face-to-face)  [] EVAL (MOD) 38918 (typically 30 minutes face-to-face)  [] EVAL (HIGH) 65850 (typically 45 minutes face-to-face)  [] RE-EVAL     [x] OC(08258) x 2    [] IONTO  [] NMR (34206) x      [] VASO  [] Manual (23350) x       [] Other:  [x] TA x 1      [] Mech Traction (44340)  [] ES(attended) (28479)      [] ES (un) (57972):     GOALS:  Patient stated goal: Return to using L UE  [] Progressing: [] Met: [] Not Met: [] Adjusted    Therapist goals for Patient:   Short Term Goals: To be achieved in: 2 weeks  1. Independent in HEP and progression per patient tolerance, in order to prevent re-injury. [] Progressing: [x] Met: [] Not Met: [] Adjusted   2. Patient will have a decrease in pain to facilitate improvement in movement, function, and ADLs as indicated by Functional Deficits. [] Progressing: [x] Met: [] Not Met: [] Adjusted    Long Term Goals: To be achieved in: 16 weeks  1. Disability index score of 25% or less for the UEFS to assist with reaching prior level of function. [] Progressing: [x] Met: [] Not Met: [] Adjusted  2. Patient will demonstrate increased AROM to WNL to allow for proper joint functioning as indicated by patients Functional Deficits. [] Progressing: [x] Met: [] Not Met: [] Adjusted  3. Patient will demonstrate an increase in strength to good scapular and core control  for UE to allow for proper functional mobility as indicated by patients Functional Deficits. [x] Progressing: [] Met: [] Not Met: [] Adjusted  4. Patient will return to Independent functional activities without increased symptoms or restriction. [x] Progressing: [] Met: [] Not Met: [] Adjusted  5. Patient will report being able to ride motorcycle with no increase in pain. [x] Progressing: [] Met: [] Not Met: [] Adjusted     Overall Progression Towards Functional goals/ Treatment Progress Update:  [x] Patient is progressing as expected towards functional goals listed. [] Progression is slowed due to complexities/Impairments listed. [] Progression has been slowed due to co-morbidities. [] Plan just implemented, too soon to assess goals progression <30days   [] Goals require adjustment due to lack of progress  [] Patient is not progressing as expected and requires additional follow up with physician  [] Other    Prognosis for POC: [x] Good [] Fair  [] Poor      Patient requires continued skilled intervention: [x] Yes  [] No    Treatment/Activity Tolerance:  [x] Patient able to complete treatment  [] Patient limited by fatigue  [] Patient limited by pain     [] Patient limited by other medical complications  [x] Other: 4/1 Patient is progressing well in physical therapy. Patient has returned to independent functional activities with mild symptoms. Patient has improved his ROM and strength and is progressing towards meeting all goals. Patient will benefit from continued physical therapy to address strength deficits following surgical procedure. Continue to progress as tolerated.                  Patient Education:                  PLAN: (1-2x/week for 4 weeks)  [x] Continue per plan of care [] Alter current plan (see comments above)  [] Plan of care initiated [] Hold pending MD visit [] Discharge      Electronically signed by:  Chelo Landrum PT, DPT    Note: If patient does not return for scheduled/ recommended follow up visits, this note will serve as a discharge from care along with most recent

## 2022-04-15 ENCOUNTER — HOSPITAL ENCOUNTER (OUTPATIENT)
Dept: PHYSICAL THERAPY | Age: 67
Setting detail: THERAPIES SERIES
Discharge: HOME | End: 2022-04-15
Payer: COMMERCIAL

## 2022-04-15 PROCEDURE — 97530 THERAPEUTIC ACTIVITIES: CPT

## 2022-04-15 PROCEDURE — 97110 THERAPEUTIC EXERCISES: CPT

## 2022-04-15 NOTE — FLOWSHEET NOTE
a  [facility] and [address] [facility], [facility] [employer] [address] [facility], [address] [place] [address]  Phone: [phone]   Fax:     [phone]    Physical Therapy Daily Treatment Note  Date:  4/15/2022    Patient Name:  Ora Vail    :  1955  MRN: [id number]  Restrictions/Precautions:    Medical/Treatment Diagnosis Information:  Diagnosis: M75.122 Complete Rotator Cuff tear or rupture of left shoulder  Treatment Diagnosis: M25.512 Left Shoulder Pain  Insurance/Certification information:  PT Insurance Information: SamiraLynne Smithjose danielvignesh Angelmichael [address]  Physician Information:  Referring Practitioner: Rosanna Haji DO  Has the plan of care been signed (Y/N):        []  Yes  [x]  No     Date of Patient follow up with Physician:       Is this a Progress Report:     []  Yes  [x]  No        If Yes:  Date Range for reporting period:  Beginning 22  Ending 22     Progress report will be due (10 Rx or 30 days whichever is less):        Recertification will be due (POC Duration  / 90 days whichever is less):22         Visit # Insurance Allowable Auth Required   15  4/15 TBD [x]  Yes []  No        Functional Scale: UEFS: 13.75% deficit   Date assessed: 22     Latex Allergy:  [x]NO      []YES  Preferred Language for Healthcare:   [x]English       []other:    Pain level:  0/10 4/15    SUBJECTIVE:  4/15 Patient states that he is doing well. He states that he is moving next week.      OBJECTIVE:   ROM     PROM AROM  Comment    L R L R    Flexion   WNL     Abduction   WNL     ER   WNL     IR   WNL     Other  (cervical)        Other             Strength    L R Comment   Flexion 4+     Abduction 4+     ER 5     IR 5     Supraspinatus      Upper Trap      Lower Trap      Mid Trap      Rhomboids      Biceps      Triceps      Horizontal Abduction      Horizontal Adduction      Lats        Special Tests     Results/Comment   Jeannine Gresham    Speeds    OBriens    Apprehension     Load & Shift         Reflexes/Sensation: 4/1              [x]Dermatomes/Myotomes intact               []Reflexes equal and normal bilaterally               []Other:   `  Joint mobility: 4/1              [x]Normal               []Hypo              []Hyper     Palpation: Denies TTP 1/17     Functional Mobility/Transfers: Independent  4/1     Posture: WNL 4/1     Gait: (include devices/WB status): WNL  4/1                       [x] Patient history, allergies, meds reviewed. Medical chart reviewed. See intake form. 1/17     Review Of Systems (ROS):   [x]Performed Review of systems (Integumentary, CardioPulmonary, Neurological) by intake and observation. Intake form has been scanned into medical record. Patient has been instructed to contact their primary care physician regarding ROS issues if not already being addressed at this time.   1/17     RESTRICTIONS/PRECAUTIONS: L RCR 1/7/22    Exercises/Interventions:   Exercises:  Exercise/Equipment Resistance/Repetitions Other comments   Stretching/PROM     Wand     Table Slides (flexsion and scaption) 10 sec x 10 ^1/24   Wall walks 10 sec x 10 Start 2/18   IR BB 10 sec x 10 Start 3/1   UE [place]     UE Assisted supine OH flexion 10 sec x 10 Start 2/8   Pulleys 10 sec x 10 Start 2/15   ER at 90 (supine) 10 sec x 10 ^3/4   Pendulum 30x each direction Start 2/8   UE Hang 10 sec x 10    UT stretch 30 sec x 3    Isometrics     Retraction      30x    Weight shift     Flexion     Abduction     External Rotation     Internal Rotation     Biceps     Triceps          PRE's     Flexion 3 x 10; 5# ^3/9   Abduction     External Rotation 3 x 10; 5# ^4/15   Internal Rotation 3 x 10; 8# ^4/15   Shrugs 3 x 10    table Push-up 3 x 10 ^4/1   EXT     Reverse Flys     Serratus     Chest press 3 x 10; 12# ^4/15   Horizontal Abd with ER 3 x 10; 2# ^4/15   Biceps 3 x 10; 7# ^3/25   Triceps 3 x 10; 7# ^3/25   Retraction Prone 10 sec x 10 Start 2/18   Prone rows 3 x 10; 5# Start 3/4   Cable Column/Theraband     External Rotation 3 x 10; 1 plate ^8/7   Internal Rotation 3 x 10; 3 plate ^0/5   Shrugs     Lats     Ext 3 x 10; 7 plates ^5/7   Flex     Scapular Retraction 3 x 10; 7 plates ^1/9   BIC 3 x 10; 5 plates Start 7/84   TRIC 3 x 10; 6 plates ^3/53   PNF          Dynamic Stability     Ball on the wall 3 x 10; 2# ^4/1   Plyoback          Manual interventions                Therapeutic Exercise and NMR EXR  [x] (37442) Provided verbal/tactile cueing for activities related to strengthening, flexibility, endurance, ROM  for improvements in scapular, scapulothoracic and UE control with self care, reaching, carrying, lifting, house/yardwork, driving/computer work. [x] (80707) Provided verbal/tactile cueing for activities related to improving balance, coordination, kinesthetic sense, posture, motor skill, proprioception  to assist with  scapular, scapulothoracic and UE control with self care, reaching, carrying, lifting, house/yardwork, driving/computer work. Therapeutic Activities:    [x] (92771 or 25406) Provided verbal/tactile cueing for activities related to improving balance, coordination, kinesthetic sense, posture, motor skill, proprioception and motor activation to allow for proper function of scapular, scapulothoracic and UE control with self care, carrying, lifting, driving/computer work.      Home Exercise Program:    [x] (28150) Reviewed/Progressed HEP activities related to strengthening, flexibility, endurance, ROM of scapular, scapulothoracic and UE control with self care, reaching, carrying, lifting, house/yardwork, driving/computer work  [] (75913) Reviewed/Progressed HEP activities related to improving balance, coordination, kinesthetic sense, posture, motor skill, proprioception of scapular, scapulothoracic and UE control with self care, reaching, carrying, lifting, house/yardwork, driving/computer work      Manual Treatments:  PROM / STM / Oscillations-Mobs:  G-I, II, III, IV (PA's, Inf., Post.)  [x] (33509) Provided manual therapy to mobilize soft tissue/joints of cervical/CT, scapular GHJ and UE for the purpose of modulating pain, promoting relaxation,  increasing ROM, reducing/eliminating soft tissue swelling/inflammation/restriction, improving soft tissue extensibility and allowing for proper ROM for normal function with self care, reaching, carrying, lifting, house/yardwork, driving/computer work    Modalities:  CP 10 min  4/15    Charges:  Timed Code Treatment Minutes: 45   Total Treatment Minutes: 56  7:04-8:00       [] EVAL (LOW) 59974 (typically 20 minutes face-to-face)  [] EVAL (MOD) 26299 (typically 30 minutes face-to-face)  [] EVAL (HIGH) 48094 (typically 45 minutes face-to-face)  [] RE-EVAL     [x] CZ(30783) x 2    [] IONTO  [] NMR (76379) x      [] VASO  [] Manual (41705) x       [] Other:  [x] TA x 1      [] Mech Traction (65495)  [] ES(attended) (43843)      [] ES (un) (38927):     GOALS:  Patient stated goal: Return to using L UE  [] Progressing: [] Met: [] Not Met: [] Adjusted    Therapist goals for Patient:   Short Term Goals: To be achieved in: 2 weeks  1. Independent in HEP and progression per patient tolerance, in order to prevent re-injury. [] Progressing: [x] Met: [] Not Met: [] Adjusted   2. Patient will have a decrease in pain to facilitate improvement in movement, function, and ADLs as indicated by Functional Deficits. [] Progressing: [x] Met: [] Not Met: [] Adjusted    Long Term Goals: To be achieved in: 16 weeks  1. Disability index score of 25% or less for the UEFS to assist with reaching prior level of function. [] Progressing: [x] Met: [] Not Met: [] Adjusted  2. Patient will demonstrate increased AROM to WNL to allow for proper joint functioning as indicated by patients Functional Deficits. [] Progressing: [x] Met: [] Not Met: [] Adjusted  3.  Patient will demonstrate an increase in strength to good scapular and core control  for UE to allow for proper functional mobility as indicated by patients Functional Deficits. [x] Progressing: [] Met: [] Not Met: [] Adjusted  4. Patient will return to Independent functional activities without increased symptoms or restriction. [x] Progressing: [] Met: [] Not Met: [] Adjusted  5. Patient will report being able to ride motorcycle with no increase in pain. [x] Progressing: [] Met: [] Not Met: [] Adjusted     Overall Progression Towards Functional goals/ Treatment Progress Update:  [x] Patient is progressing as expected towards functional goals listed. [] Progression is slowed due to complexities/Impairments listed. [] Progression has been slowed due to co-morbidities. [] Plan just implemented, too soon to assess goals progression <30days   [] Goals require adjustment due to lack of progress  [] Patient is not progressing as expected and requires additional follow up with physician  [] Other    Prognosis for POC: [x] Good [] Fair  [] Poor      Patient requires continued skilled intervention: [x] Yes  [] No    Treatment/Activity Tolerance:  [x] Patient able to complete treatment  [] Patient limited by fatigue  [] Patient limited by pain     [] Patient limited by other medical complications  [x] Other: 4/15 Patient tolerated treatment well this session. No reports of pain with exercises. Good tolerance to increases in weight and resistance. Continue to progress as tolerated. Follow-up after patient's move. Patient Education:                  PLAN: (1-2x/week for 4 weeks)  [x] Continue per plan of care [] Alter current plan (see comments above)  [] Plan of care initiated [] Hold pending MD visit [] Discharge      Electronically signed by:  Jayro Kwan PT, DPT    Note: If patient does not return for scheduled/ recommended follow up visits, this note will serve as a discharge from care along with most recent update on progress.

## 2022-05-02 ENCOUNTER — OFFICE VISIT (OUTPATIENT)
Dept: FAMILY MEDICINE CLINIC | Age: 67
End: 2022-05-02
Payer: COMMERCIAL

## 2022-05-02 VITALS
OXYGEN SATURATION: 99 % | WEIGHT: 176.2 LBS | HEART RATE: 69 BPM | TEMPERATURE: 98.2 F | SYSTOLIC BLOOD PRESSURE: 124 MMHG | BODY MASS INDEX: 25.83 KG/M2 | DIASTOLIC BLOOD PRESSURE: 72 MMHG

## 2022-05-02 DIAGNOSIS — L25.9 CONTACT DERMATITIS, UNSPECIFIED CONTACT DERMATITIS TYPE, UNSPECIFIED TRIGGER: Primary | ICD-10-CM

## 2022-05-02 PROCEDURE — 99213 OFFICE O/P EST LOW 20 MIN: CPT | Performed by: FAMILY MEDICINE

## 2022-05-02 RX ORDER — PREDNISONE 20 MG/1
TABLET ORAL
Qty: 16 TABLET | Refills: 0 | Status: SHIPPED | OUTPATIENT
Start: 2022-05-02 | End: 2022-10-07

## 2022-05-02 RX ORDER — SILDENAFIL 100 MG/1
100 TABLET, FILM COATED ORAL DAILY PRN
Qty: 10 TABLET | Refills: 5 | Status: SHIPPED | OUTPATIENT
Start: 2022-05-02

## 2022-05-02 NOTE — PROGRESS NOTES
Patient is here today due to rash. Thinks it maybe poison ivy. Started about a week ago. Has on left arm, right side of neck, scalp, and ankle. Thinks granddaughter's dog may have had it on him and then rubbed up against him. Has been doing ivy Dry. Last night was itching bad and used noxema and helped. Vitals:    05/02/22 1013   BP: 124/72   Site: Left Upper Arm   Position: Sitting   Cuff Size: Large Adult   Pulse: 69   Temp: 98.2 °F (36.8 °C)   TempSrc: Infrared   SpO2: 99%   Weight: 176 lb 3.2 oz (79.9 kg)     Wt Readings from Last 3 Encounters:   05/02/22 176 lb 3.2 oz (79.9 kg)   12/21/21 179 lb 4 oz (81.3 kg)   06/10/21 180 lb (81.6 kg)     Body mass index is 25.83 kg/m². PHQ Scores 6/8/2021 9/2/2020 11/15/2019 5/20/2019 11/15/2018 8/30/2017   PHQ2 Score 0 0 0 0 0 0   PHQ9 Score 0 0 0 0 0 0       Interpretation of Total Score Depression Severity: 1-4 = Minimal depression, 5-9 = Mild depression, 10-14 = Moderate depression, 15-19 = Moderately severe depression, 20-27 = Severe depression       GEN: Alert and oriented x 4 NAD, affect appropriate and overweight, well hydrated, well developed. Left forearm, right side of neck and small patch right lower calf. Patches of red bumpy slightly dry areas          ASSESSMENT AND PLAN:       Diana Mejias was seen today for rash. Diagnoses and all orders for this visit:    Contact dermatitis, unspecified contact dermatitis type, unspecified trigger    Other orders  -     sildenafil (VIAGRA) 100 MG tablet;  Take 1 tablet by mouth daily as needed for Erectile Dysfunction  -     predniSONE (DELTASONE) 20 MG tablet; 3 daily x 3 d, 2 daily x 2 d, 1 daily x 2 d, 1/2 daily x 2 d            Portions of Note per  Hilario Berumen CMA AAMA with corrections and edits per Jose Bills MD.  I agree with entirety of note and was present and performed history and physical.  I also confirm that the note above accurately reflects all work, treatment, procedures, and medical decision making performed by me, Virginia Raymundo MD

## 2022-05-11 DIAGNOSIS — E78.00 ELEVATED CHOLESTEROL: ICD-10-CM

## 2022-05-11 RX ORDER — ATORVASTATIN CALCIUM 40 MG/1
TABLET, FILM COATED ORAL
Qty: 90 TABLET | Refills: 0 | Status: SHIPPED | OUTPATIENT
Start: 2022-05-11 | End: 2022-08-23 | Stop reason: SDUPTHER

## 2022-05-27 ENCOUNTER — HOSPITAL ENCOUNTER (OUTPATIENT)
Dept: PHYSICAL THERAPY | Age: 67
Setting detail: THERAPIES SERIES
Discharge: HOME | End: 2022-05-27
Payer: COMMERCIAL

## 2022-05-27 PROCEDURE — 97110 THERAPEUTIC EXERCISES: CPT

## 2022-05-27 NOTE — DISCHARGE SUMMARY
a  The [facility] and [facility], [address], [address] [facility]  Phone: [phone]   Fax:     [phone]   Physical Therapy Discharge Summary    Dear  ,    We had the pleasure of treating the following patient for physical therapy services at [address] [place] [address]. A summary of our findings can be found in the discharge summary below. If you have any questions or concerns regarding these findings, please do not hesitate to contact me at the office phone number checked above.   Thank you for the referral.     Physician Signature:________________________________Date:__________________  By signing above (or electronic signature), therapists plan is approved by physician      Overall Response to Treatment:   [x]Patient is responding well to treatment and improvement is noted with regards  to goals   []Patient should continue to improve in reasonable time if they continue HEP   []Patient has plateaued and is no longer responding to skilled PT intervention    []Patient is getting worse and would benefit from return to referring MD   []Patient unable to adhere to initial POC   [x]Other: Discharge to HEP    Date range of Visits: 22- 22    Total Visits: 16    Physical Therapy Daily Treatment Note  Date:  2022    Patient Name:  Jase Moise    :  1955  MRN: [id number]  Restrictions/Precautions:    Medical/Treatment Diagnosis Information:  Diagnosis: M75.122 Complete Rotator Cuff tear or rupture of left shoulder  Treatment Diagnosis: M25.512 Left Shoulder Pain  Insurance/Certification information:  PT Insurance Information: Gissell Moraes 150  Physician Information:  Referring Practitioner: Jer Echeverria DO  Has the plan of care been signed (Y/N):        []  Yes  [x]  No     Date of Patient follow up with Physician:       Is this a Progress Report:     []  Yes  [x]  No        If Yes:  Date Range for reporting period:  Beginning   Ending      Progress report will be due (10 Rx or 30 days whichever is less):        Recertification will be due (POC Duration  / 90 days whichever is less):        Visit # Insurance Allowable Auth Required   16  5/27 TBD [x]  Yes []  No        Functional Scale: UEFS: 0% deficit   Date assessed: 5/27     Latex Allergy:  [x]NO      []YES  Preferred Language for Healthcare:   [x]English       []other:    Pain level:  0/10 5/27    SUBJECTIVE:  5/27 Doing well. No complaints     OBJECTIVE:   ROM  5/27   PROM AROM  Comment    L R L R    Flexion   WNL     Abduction   WNL     ER   WNL     IR   WNL     Other  (cervical)        Other             Strength  5/27 L R Comment   Flexion 5     Abduction 5     ER 5     IR 5     Supraspinatus      Upper Trap      Lower Trap      Mid Trap      Rhomboids      Biceps      Triceps      Horizontal Abduction      Horizontal Adduction      Lats        Special Tests  5/27 Results/Comment   Jeannine    Nestephani    Speeds    OBriens    Apprehension     Load & Shift         Reflexes/Sensation: 5/27              [x]Dermatomes/Myotomes intact               []Reflexes equal and normal bilaterally               []Other:   `  Joint mobility: 5/27              [x]Normal               []Hypo              []Hyper     Palpation: Denies TTP 5/27     Functional Mobility/Transfers: Independent  5/27     Posture: WNL 5/27     Gait: (include devices/WB status): WNL 5/27                       [x] Patient history, allergies, meds reviewed. Medical chart reviewed. See intake form. 1/17     Review Of Systems (ROS):   [x]Performed Review of systems (Integumentary, CardioPulmonary, Neurological) by intake and observation. Intake form has been scanned into medical record. Patient has been instructed to contact their primary care physician regarding ROS issues if not already being addressed at this time.   1/17     RESTRICTIONS/PRECAUTIONS: L RCR 1/7/22    Exercises/Interventions: Exercises:  Exercise/Equipment Resistance/Repetitions Other comments   Stretching/PROM     Wand     Table Slides (flexsion and scaption) 10 sec x 10 ^1/24   Wall walks 10 sec x 10 Start 2/18   IR BB 10 sec x 10 Start 3/1   UE [place]     UE Assisted supine OH flexion 10 sec x 10 Start 2/8   Pulleys 10 sec x 10 Start 2/15   ER at 90 (supine) 10 sec x 10 ^3/4   Pendulum 30x each direction Start 2/8   UE Hang 10 sec x 10    UT stretch 30 sec x 3    Isometrics     Retraction      30x    Weight shift     Flexion     Abduction     External Rotation     Internal Rotation     Biceps     Triceps          PRE's     Flexion 3 x 10; 5# ^3/9   Abduction     External Rotation 3 x 10; 5# ^4/15   Internal Rotation 3 x 10; 8# ^4/15   Shrugs 3 x 10    table Push-up 3 x 10 ^4/1   EXT     Reverse Flys     Serratus     Chest press 3 x 10; 12# ^4/15   Horizontal Abd with ER 3 x 10; 2# ^4/15   Biceps 3 x 10; 7# ^3/25   Triceps 3 x 10; 7# ^3/25   Retraction Prone 10 sec x 10 Start 2/18   Prone rows 3 x 10; 5# Start 3/4   Cable Column/Theraband     External Rotation 3 x 10; 1 plate ^9/3   Internal Rotation 3 x 10; 3 plate ^9/8   Shrugs     Lats     Ext 3 x 10; 7 plates ^3/3   Flex     Scapular Retraction 3 x 10; 7 plates ^5/9   BIC 3 x 10; 5 plates Start 6/37   TRIC 3 x 10; 6 plates ^4/09   PNF          Dynamic Stability     Ball on the wall 3 x 10; 2# ^4/1   [place]          Manual interventions                Therapeutic Exercise and NMR EXR  [x] (02136) Provided verbal/tactile cueing for activities related to strengthening, flexibility, endurance, ROM  for improvements in scapular, scapulothoracic and UE control with self care, reaching, carrying, lifting, house/yardwork, driving/computer work.     [x] (83718) Provided verbal/tactile cueing for activities related to improving balance, coordination, kinesthetic sense, posture, motor skill, proprioception  to assist with  scapular, scapulothoracic and UE control with self care, reaching, carrying, lifting, house/yardwork, driving/computer work. Therapeutic Activities:    [x] (34539 or 48581) Provided verbal/tactile cueing for activities related to improving balance, coordination, kinesthetic sense, posture, motor skill, proprioception and motor activation to allow for proper function of scapular, scapulothoracic and UE control with self care, carrying, lifting, driving/computer work.      Home Exercise Program:    [x] (28289) Reviewed/Progressed HEP activities related to strengthening, flexibility, endurance, ROM of scapular, scapulothoracic and UE control with self care, reaching, carrying, lifting, house/yardwork, driving/computer work  [] (84006) Reviewed/Progressed HEP activities related to improving balance, coordination, kinesthetic sense, posture, motor skill, proprioception of scapular, scapulothoracic and UE control with self care, reaching, carrying, lifting, house/yardwork, driving/computer work      Manual Treatments:  PROM / STM / Oscillations-Mobs:  G-I, II, III, IV (PA's, Inf., Post.)  [x] (52463) Provided manual therapy to mobilize soft tissue/joints of cervical/CT, scapular GHJ and UE for the purpose of modulating pain, promoting relaxation,  increasing ROM, reducing/eliminating soft tissue swelling/inflammation/restriction, improving soft tissue extensibility and allowing for proper ROM for normal function with self care, reaching, carrying, lifting, house/yardwork, driving/computer work    Modalities:  CP 10 min  4/15    Charges:  Timed Code Treatment Minutes: 15   Total Treatment Minutes: 20  10:33-10:53       [] EVAL (LOW) 02333 (typically 20 minutes face-to-face)  [] EVAL (MOD) 05114 (typically 30 minutes face-to-face)  [] EVAL (HIGH) 21883 (typically 45 minutes face-to-face)  [] RE-EVAL     [x] XQ(73625) x 1    [] IONTO  [] NMR (12365) x      [] VASO  [] Manual (75104) x       [] Other:  [] TA x       [] Mech Traction (76892)  [] ES(attended) (08610)      [] ES (un) (39132):     GOALS:  Patient stated goal: Return to using L UE  [] Progressing: [x] Met: [] Not Met: [] Adjusted    Therapist goals for Patient:   Short Term Goals: To be achieved in: 2 weeks  1. Independent in HEP and progression per patient tolerance, in order to prevent re-injury. [] Progressing: [x] Met: [] Not Met: [] Adjusted   2. Patient will have a decrease in pain to facilitate improvement in movement, function, and ADLs as indicated by Functional Deficits. [] Progressing: [x] Met: [] Not Met: [] Adjusted    Long Term Goals: To be achieved in: 16 weeks  1. Disability index score of 25% or less for the UEFS to assist with reaching prior level of function. [] Progressing: [x] Met: [] Not Met: [] Adjusted  2. Patient will demonstrate increased AROM to WNL to allow for proper joint functioning as indicated by patients Functional Deficits. [] Progressing: [x] Met: [] Not Met: [] Adjusted  3. Patient will demonstrate an increase in strength to good scapular and core control  for UE to allow for proper functional mobility as indicated by patients Functional Deficits. [x] Progressing: [x] Met: [] Not Met: [] Adjusted  4. Patient will return to Independent functional activities without increased symptoms or restriction. [] Progressing: [x] Met: [] Not Met: [] Adjusted  5. Patient will report being able to ride motorcycle with no increase in pain. [] Progressing: [x] Met: [] Not Met: [] Adjusted     Overall Progression Towards Functional goals/ Treatment Progress Update:  [x] Patient is progressing as expected towards functional goals listed. [] Progression is slowed due to complexities/Impairments listed. [] Progression has been slowed due to co-morbidities.   [] Plan just implemented, too soon to assess goals progression <30days   [] Goals require adjustment due to lack of progress  [] Patient is not progressing as expected and requires additional follow up with physician  [] Other    Prognosis for POC: [x] Good [] Fair  [] Poor      Patient requires continued skilled intervention: [] Yes  [x] No    Treatment/Activity Tolerance:  [x] Patient able to complete treatment  [] Patient limited by fatigue  [] Patient limited by pain     [] Patient limited by other medical complications  [x] Other: 5/27 Discharge from Physical therapy. Patient Education:                  PLAN: (1-2x/week for 4 weeks)  [] Continue per plan of care [] Alter current plan (see comments above)  [] Plan of care initiated [] Hold pending MD visit [x] Discharge      Electronically signed by:  Jayro Kwan, PT, DPT    Note: If patient does not return for scheduled/ recommended follow up visits, this note will serve as a discharge from care along with most recent update on progress.

## 2022-07-02 DIAGNOSIS — H81.09 MENIERE'S DISEASE, COCHLEOVESTIBULAR, ACTIVE, UNSPECIFIED LATERALITY: Chronic | ICD-10-CM

## 2022-07-05 DIAGNOSIS — H81.09 MENIERE'S DISEASE, COCHLEOVESTIBULAR, ACTIVE, UNSPECIFIED LATERALITY: Chronic | ICD-10-CM

## 2022-07-05 RX ORDER — TRIAMTERENE AND HYDROCHLOROTHIAZIDE 37.5; 25 MG/1; MG/1
CAPSULE ORAL
Qty: 90 CAPSULE | Refills: 3 | OUTPATIENT
Start: 2022-07-05

## 2022-07-05 RX ORDER — TRIAMTERENE AND HYDROCHLOROTHIAZIDE 37.5; 25 MG/1; MG/1
CAPSULE ORAL
Qty: 15 CAPSULE | Refills: 0 | Status: SHIPPED | OUTPATIENT
Start: 2022-07-05 | End: 2022-07-12 | Stop reason: SDUPTHER

## 2022-07-05 NOTE — TELEPHONE ENCOUNTER
Patient is currently out of his medication and would like a refill as he is completely out. Patient did schedule an appointment for 7/12/2022, but still needs the medication to hold him over till he comes into the office. Refill Request:     Last Office Visit:  6/10/2021     Next Scheduled Visit : 7/12/2022     Medication Requested:   Triamterene-HCTZ 37.5-25 MG     Pharmacy:    [facility] , [address] [facility]  [address]   [place] [address]  Phone: [phone] Fax: [phone]

## 2022-07-12 ENCOUNTER — OFFICE VISIT (OUTPATIENT)
Dept: ENT CLINIC | Age: 67
End: 2022-07-12
Payer: COMMERCIAL

## 2022-07-12 VITALS — DIASTOLIC BLOOD PRESSURE: 78 MMHG | HEART RATE: 79 BPM | TEMPERATURE: 97.8 F | SYSTOLIC BLOOD PRESSURE: 168 MMHG

## 2022-07-12 DIAGNOSIS — H81.09 MENIERE'S DISEASE, COCHLEOVESTIBULAR, ACTIVE, UNSPECIFIED LATERALITY: Primary | Chronic | ICD-10-CM

## 2022-07-12 PROCEDURE — 1123F ACP DISCUSS/DSCN MKR DOCD: CPT | Performed by: OTOLARYNGOLOGY

## 2022-07-12 PROCEDURE — 99213 OFFICE O/P EST LOW 20 MIN: CPT | Performed by: OTOLARYNGOLOGY

## 2022-07-12 RX ORDER — TRIAMTERENE AND HYDROCHLOROTHIAZIDE 37.5; 25 MG/1; MG/1
CAPSULE ORAL
Qty: 90 CAPSULE | Refills: 3 | Status: SHIPPED | OUTPATIENT
Start: 2022-07-12

## 2022-07-12 NOTE — PROGRESS NOTES
[facility]       PCP:  Lucy Muhammad MD      CHIEF COMPLAINT  Chief Complaint   Patient presents with    Ear Problem     recheck Meniere's disease       HISTORY OF PRESENT ILLNESS       Fátima Olmos is a 79 y.o. male here today for recheck of Meniere's disease. Patient stated that he has had no flare ups since the last visit here one year ago. He denied any other ENTS problems. REVIEW OF SYSTEMS   Review of Systems   Constitutional: Negative for chills and fever. HENT: Negative for ear discharge, ear pain, rhinorrhea, sinus pain and sore throat. PAST MEDICAL HISTORY    Past Medical History:   Diagnosis Date    Hyperlipidemia     Impotence of organic origin     Meniere's disease     takes dyazide       Past Surgical History:   Procedure Laterality Date    BLEPHAROPLASTY Bilateral 2/14/2019    BILATERAL LOWER EYE BLEPHAROPLASTY performed by Stevan Mathias MD at [facility] [address] Right     KNEE ARTHROSCOPY Left     OTHER SURGICAL HISTORY Right 11/04/2016    Right rotator cuff repair    NH LAP,INGUINAL HERNIA REPR,INITIAL Left 10/30/2018    ROBOT ASSISTED LAPAROSCOPIC LEFT INGUINAL HERNIA REPAIR WITH MESH AND OPEN UMBILICAL HERNIA REPAIR performed by Vicki Finn MD at [place] [address] Right     rotator cuff    VASECTOMY           EXAMINATION    Vitals:    07/12/22 1415   BP: (!) 168/78   Site: Left Upper Arm   Position: Sitting   Cuff Size: Medium Adult   Pulse: 79   Temp: 97.8 °F (36.6 °C)   TempSrc: Temporal     General:  WDWN, NAD, alert and oriented  Face: There was no swelling or lesions detected. Voice: Normal with no hoarseness or hot potato voice.     Ears:  TMs and EACs appeared to be normal.      Nose: The nasal septum, turbinates, secretions, and mucosa appeared to be normal.   Sinuses:  Maxillary and frontal sinuses were nontender to palpation and percussion. Oral cavity:  Mucosa, secretions, tongue, and gingiva appeared to be normal.   Oropharynx:  The palatine tonsils, hard and soft palates, uvula, tongue, posterior oropharyngeal wall, mucosa and secretions appeared to be normal.     Salivary Glands:  Normal bilateral parotid and bilateral submandibular salivary glands. Neck:  No masses or tenderness. Trachea midline. Laryngeal cartilages and hyoid bone normal.  Normal laryngeal creptus. Thyroid:  Normal, nontender, no goiter or nodules palpable. Lymph nodes:  No cervical lymphadenopathy. LABORATORY RESULTS    Component Ref Range & Units 12/29/21 0955 11/6/20 0933 11/18/19 1015     Potassium 3.5 - 5.3 mmol/L 3.9  4.3  4.4             IMPRESSION / DIAGNOSES / Allen Aron was seen today for ear problem. Diagnoses and all orders for this visit:    Meniere's disease, cochleovestibular, active, unspecified laterality  Comments:  Well-controlled with Dyazide therapy. Orders:  -     Potassium; Future  -     triamterene-hydroCHLOROthiazide (DYAZIDE) 37.5-25 MG per capsule; TAKE 1 CAPSULE BY MOUTH EVERY DAY         RECOMMENDATIONS/PLAN      1. Continue Dyazide daily. 2. Serum Potassium on or about 12/29/2022.  3. Eat a banana and/or glass of OJ daily for extra potassium. 4. Return in about 1 year (around 7/7/2023) for recheck/follow-up, and sooner if condition worsens.

## 2022-07-13 ASSESSMENT — ENCOUNTER SYMPTOMS
RHINORRHEA: 0
SINUS PAIN: 0
SORE THROAT: 0

## 2022-08-18 DIAGNOSIS — E78.00 ELEVATED CHOLESTEROL: ICD-10-CM

## 2022-08-18 NOTE — TELEPHONE ENCOUNTER
Medication:   Requested Prescriptions     Pending Prescriptions Disp Refills    atorvastatin (LIPITOR) 40 MG tablet [Pharmacy Med Name: ATORVASTATIN 40 MG TABLET] 90 tablet 0     Sig: TAKE 1 TABLET BY MOUTH EVERY DAY      Last Filled:      Patient Phone Number: [phone] (home) [phone] (work)    Last appt: 5/2/2022   Next appt: Visit date not found    Last OARRS:   RX Monitoring 10/28/2016   Attestation The Prescription Monitoring Report for this patient was reviewed today. PDMP Monitoring:    Last PDMP Melinda Colindres as Reviewed [facility]):  Review User Review Instant Review Result          Preferred Pharmacy:   [facility]/pharmacy [address] - P [phone] - F [phone]  [address]   [place] [address]  Phone: [phone] Fax: [phone]

## 2022-08-19 RX ORDER — ATORVASTATIN CALCIUM 40 MG/1
TABLET, FILM COATED ORAL
Qty: 90 TABLET | Refills: 0 | OUTPATIENT
Start: 2022-08-19

## 2022-08-23 RX ORDER — ATORVASTATIN CALCIUM 40 MG/1
TABLET, FILM COATED ORAL
Qty: 90 TABLET | Refills: 0 | Status: SHIPPED | OUTPATIENT
Start: 2022-08-23 | End: 2022-10-07 | Stop reason: SDUPTHER

## 2022-10-07 ENCOUNTER — OFFICE VISIT (OUTPATIENT)
Dept: FAMILY MEDICINE CLINIC | Age: 67
End: 2022-10-07
Payer: COMMERCIAL

## 2022-10-07 VITALS
SYSTOLIC BLOOD PRESSURE: 124 MMHG | DIASTOLIC BLOOD PRESSURE: 62 MMHG | OXYGEN SATURATION: 96 % | WEIGHT: 175.13 LBS | HEART RATE: 75 BPM | TEMPERATURE: 98.2 F | BODY MASS INDEX: 25.94 KG/M2 | RESPIRATION RATE: 12 BRPM | HEIGHT: 69 IN

## 2022-10-07 DIAGNOSIS — N52.8 OTHER MALE ERECTILE DYSFUNCTION: ICD-10-CM

## 2022-10-07 DIAGNOSIS — M15.9 PRIMARY OSTEOARTHRITIS INVOLVING MULTIPLE JOINTS: ICD-10-CM

## 2022-10-07 DIAGNOSIS — E78.00 ELEVATED CHOLESTEROL: ICD-10-CM

## 2022-10-07 DIAGNOSIS — H81.09 MENIERE'S DISEASE, COCHLEOVESTIBULAR, ACTIVE, UNSPECIFIED LATERALITY: Chronic | ICD-10-CM

## 2022-10-07 DIAGNOSIS — E78.2 MIXED HYPERLIPIDEMIA: Primary | ICD-10-CM

## 2022-10-07 PROCEDURE — 99214 OFFICE O/P EST MOD 30 MIN: CPT | Performed by: FAMILY MEDICINE

## 2022-10-07 PROCEDURE — 1123F ACP DISCUSS/DSCN MKR DOCD: CPT | Performed by: FAMILY MEDICINE

## 2022-10-07 RX ORDER — ATORVASTATIN CALCIUM 40 MG/1
TABLET, FILM COATED ORAL
Qty: 90 TABLET | Refills: 3 | Status: SHIPPED | OUTPATIENT
Start: 2022-10-07

## 2022-10-07 RX ORDER — TRIAMTERENE AND HYDROCHLOROTHIAZIDE 37.5; 25 MG/1; MG/1
CAPSULE ORAL
Qty: 90 CAPSULE | Refills: 3 | Status: CANCELLED | OUTPATIENT
Start: 2022-10-07

## 2022-10-07 ASSESSMENT — PATIENT HEALTH QUESTIONNAIRE - PHQ9
SUM OF ALL RESPONSES TO PHQ QUESTIONS 1-9: 0
SUM OF ALL RESPONSES TO PHQ9 QUESTIONS 1 & 2: 0
SUM OF ALL RESPONSES TO PHQ QUESTIONS 1-9: 0
SUM OF ALL RESPONSES TO PHQ QUESTIONS 1-9: 0
2. FEELING DOWN, DEPRESSED OR HOPELESS: 0
SUM OF ALL RESPONSES TO PHQ QUESTIONS 1-9: 0
1. LITTLE INTEREST OR PLEASURE IN DOING THINGS: 0

## 2022-10-07 NOTE — PROGRESS NOTES
Subjective:      Patient ID: Heriberto Carcamo is a 79 y.o. male. CC: Patient presents for re-evaluation of chronic health problems including hyperlipidemia, arthritis and ED issues. HPI pt is here for a follow up, med refill. Patient feels he has successful left shoulder surgery and still has some tightness in the shoulder but not severe. He continues taking medication per ENT specialist for recurrent vertigo and he feels he is doing extremely well in that regards. His blood pressure was elevated when he was at the ENT specialist but normally has blood pressures been well controlled. He continues take the Viagra on a as needed basis. Review of Systems  Patient Active Problem List   Diagnosis    Tinnitus    Meniere's disease, cochleovestibular, active    Mixed hyperlipidemia    Primary osteoarthritis involving multiple joints       Outpatient Medications Marked as Taking for the 10/7/22 encounter (Office Visit) with Funmilayo Eckert MD   Medication Sig Dispense Refill    atorvastatin (LIPITOR) 40 MG tablet TAKE 1 TABLET BY MOUTH EVERY DAY 90 tablet 0    triamterene-hydroCHLOROthiazide (DYAZIDE) 37.5-25 MG per capsule TAKE 1 CAPSULE BY MOUTH EVERY DAY 90 capsule 3    sildenafil (VIAGRA) 100 MG tablet Take 1 tablet by mouth daily as needed for Erectile Dysfunction 10 tablet 5       Allergies   Allergen Reactions    Seasonal      sneezing       Social History     Tobacco Use    Smoking status: Never    Smokeless tobacco: Never   Substance Use Topics    Alcohol use: Yes     Comment: occ alcohol use/ weekends 3-4 drinks       /62 (Site: Right Upper Arm, Position: Sitting, Cuff Size: Large Adult)   Pulse 75   Temp 98.2 °F (36.8 °C) (Infrared)   Resp 12   Ht 5' 8.75\" (1.746 m)   Wt 175 lb 2 oz (79.4 kg)   SpO2 96%   BMI 26.05 kg/m²      Objective:   Physical Exam  Constitutional:       General: He is not in acute distress. Appearance: He is well-developed.    Neck:      Vascular: No carotid bruit. Cardiovascular:      Rate and Rhythm: Normal rate and regular rhythm. Pulses:           Dorsalis pedis pulses are 2+ on the right side and 2+ on the left side. Posterior tibial pulses are 2+ on the right side and 2+ on the left side. Heart sounds: Normal heart sounds. No murmur heard. No friction rub. No gallop. Pulmonary:      Effort: Pulmonary effort is normal.      Breath sounds: Normal breath sounds. Musculoskeletal:         General: No tenderness. Normal range of motion. Right lower leg: No edema. Left lower leg: No edema. Neurological:      Mental Status: He is alert and oriented to person, place, and time. Sensory: Sensation is intact. Motor: Motor function is intact. Psychiatric:         Behavior: Behavior is cooperative. Assessment:      Keenan Castro was seen today for follow-up and hyperlipidemia. Diagnoses and all orders for this visit:    Mixed hyperlipidemia    Elevated cholesterol  -     atorvastatin (LIPITOR) 40 MG tablet; TAKE 1 TABLET BY MOUTH EVERY DAY  -     Comprehensive Metabolic Panel; Future  -     Lipid Panel; Future    Meniere's disease, cochleovestibular, active, unspecified laterality  Comments:  Well-controlled with Dyazide therapy. Primary osteoarthritis involving multiple joints    Other male erectile dysfunction          Plan:      Proceed with laboratory testing. Adjustments of medication will be done after laboratory testing results available. Patient received counseling on the following healthy behaviors: nutrition and exercise     Patient given educational materials     Health maintenance updated    Discussed use, benefit, and side effects of prescribed medications. Barriers to medication compliance addressed. All patient questions answered. Pt voiced understanding. Patient needs RTC in one year. Medical decision making of moderate complexity.     Please note that this chart was generated using Dragon dictation software. Although every effort was made to ensure the accuracy of this automated transcription, some errors in transcription may have occurred.

## 2022-10-18 ENCOUNTER — OFFICE VISIT (OUTPATIENT)
Dept: FAMILY MEDICINE CLINIC | Age: 67
End: 2022-10-18
Payer: COMMERCIAL

## 2022-10-18 VITALS
DIASTOLIC BLOOD PRESSURE: 72 MMHG | HEART RATE: 70 BPM | WEIGHT: 177.5 LBS | BODY MASS INDEX: 26.4 KG/M2 | SYSTOLIC BLOOD PRESSURE: 130 MMHG | RESPIRATION RATE: 12 BRPM | TEMPERATURE: 98.1 F | OXYGEN SATURATION: 98 %

## 2022-10-18 DIAGNOSIS — H57.11 DISCOMFORT OF RIGHT EYE: Primary | ICD-10-CM

## 2022-10-18 PROCEDURE — 1123F ACP DISCUSS/DSCN MKR DOCD: CPT | Performed by: FAMILY MEDICINE

## 2022-10-18 PROCEDURE — 99213 OFFICE O/P EST LOW 20 MIN: CPT | Performed by: FAMILY MEDICINE

## 2022-10-18 ASSESSMENT — VISUAL ACUITY: OU: 1

## 2022-10-18 ASSESSMENT — ENCOUNTER SYMPTOMS
EYE PAIN: 1
EYE REDNESS: 1
EYE DISCHARGE: 1

## 2022-10-18 NOTE — PROGRESS NOTES
Subjective:      Patient ID: Felicia Priest is a 79 y.o. male. CC: Patient presents for acute medical problem-tenderness in right eye. Medical assistant notes reviewed. Conjunctivitis   Episode onset: Saturday. The onset was sudden. The problem has been gradually improving. The problem is mild. Nothing relieves the symptoms. Nothing aggravates the symptoms. Associated symptoms include eye discharge, eye pain and eye redness. The eye pain is moderate. Patient denies any matting or discharge from the eye. Is complaining of discomfort in the eye more than anything else. He does have a history in the past of retinal detachment of the right eye. Review of Systems   Eyes:  Positive for pain, discharge and redness. Allergies   Allergen Reactions    Seasonal      sneezing      Objective:   Physical Exam  Constitutional:       General: He is not in acute distress. Appearance: Normal appearance. Eyes:      General: Vision grossly intact. Gaze aligned appropriately. Right eye: No foreign body, discharge or hordeolum. Left eye: No foreign body, discharge or hordeolum. Extraocular Movements: Extraocular movements intact. Conjunctiva/sclera:      Right eye: Right conjunctiva is injected. Left eye: Left conjunctiva is not injected. Comments: Fundoscopic exam normal with nondilated   Neurological:      Mental Status: He is alert and oriented to person, place, and time. Psychiatric:         Behavior: Behavior is cooperative. Assessment:      Oral Snell was seen today for conjunctivitis. Diagnoses and all orders for this visit:    Discomfort of right eye          Plan:      Patient has been under care of ophthalmology in the past for retinal detachment surgery. Recommended patient reach out to his ophthalmologist for an appointment as soon as possible. RTC as needed        Please note that this chart was generated using Dragon dictation software.  Although every effort was made to ensure the accuracy of this automated transcription, some errors in transcription may have occurred.

## 2023-04-17 ENCOUNTER — OFFICE VISIT (OUTPATIENT)
Dept: FAMILY MEDICINE CLINIC | Age: 68
End: 2023-04-17
Payer: COMMERCIAL

## 2023-04-17 VITALS
HEART RATE: 84 BPM | OXYGEN SATURATION: 97 % | WEIGHT: 180 LBS | BODY MASS INDEX: 26.78 KG/M2 | SYSTOLIC BLOOD PRESSURE: 128 MMHG | TEMPERATURE: 97.7 F | DIASTOLIC BLOOD PRESSURE: 80 MMHG

## 2023-04-17 DIAGNOSIS — M70.41 PREPATELLAR BURSITIS OF RIGHT KNEE: Primary | ICD-10-CM

## 2023-04-17 PROCEDURE — G8428 CUR MEDS NOT DOCUMENT: HCPCS | Performed by: FAMILY MEDICINE

## 2023-04-17 PROCEDURE — 99213 OFFICE O/P EST LOW 20 MIN: CPT | Performed by: FAMILY MEDICINE

## 2023-04-17 PROCEDURE — G8417 CALC BMI ABV UP PARAM F/U: HCPCS | Performed by: FAMILY MEDICINE

## 2023-04-17 PROCEDURE — 1123F ACP DISCUSS/DSCN MKR DOCD: CPT | Performed by: FAMILY MEDICINE

## 2023-04-17 PROCEDURE — 1036F TOBACCO NON-USER: CPT | Performed by: FAMILY MEDICINE

## 2023-04-17 PROCEDURE — 3017F COLORECTAL CA SCREEN DOC REV: CPT | Performed by: FAMILY MEDICINE

## 2023-04-17 RX ORDER — DICLOFENAC SODIUM 75 MG/1
75 TABLET, DELAYED RELEASE ORAL 2 TIMES DAILY WITH MEALS
Qty: 30 TABLET | Refills: 1 | Status: SHIPPED | OUTPATIENT
Start: 2023-04-17

## 2023-04-17 NOTE — PROGRESS NOTES
Subjective:      Patient ID: Villa Hickman is a 76 y.o. male. CC: Patient presents for acute medical problem-right knee pain. Medical assistant notes reviewed. HPI Patient presents with right knee pain and swelling for the past 2 weeks. He has been icing the knee. He states it swells up in the evening after being on it all day. He states the knee has been getting stiff. Patient member so particular injury but has been doing a lot of work on his property with a lot of unlevel ground. He has been using a knee brace just for the last week. Review of Systems      Allergies   Allergen Reactions    Seasonal      sneezing        Objective:   Physical Exam  Vitals and nursing note reviewed. Constitutional:       General: He is not in acute distress. Appearance: He is well-developed. Musculoskeletal:      Right knee: No swelling, deformity or crepitus. Normal range of motion. Tenderness present over the patellar tendon. No medial joint line, lateral joint line, MCL or LCL tenderness. No LCL laxity, MCL laxity or ACL laxity. Normal patellar mobility. Left knee: Normal.   Skin:     General: Skin is warm. Findings: No rash. Neurological:      Mental Status: He is alert. Psychiatric:         Behavior: Behavior is cooperative. Assessment:      Elissa Whitehead was seen today for knee pain. Diagnoses and all orders for this visit:    Prepatellar bursitis of right knee    Other orders  -     diclofenac (VOLTAREN) 75 MG EC tablet; Take 1 tablet by mouth 2 times daily (with meals) Take with food            Plan:      Patient was advised to go ahead and continue with the ice and knee brace.   Informational handout provided  RTC PRN    Medical decision making of low complexity

## 2023-07-10 ENCOUNTER — TELEPHONE (OUTPATIENT)
Dept: FAMILY MEDICINE CLINIC | Age: 68
End: 2023-07-10

## 2023-07-10 NOTE — TELEPHONE ENCOUNTER
----- Message from Rhett Weiner sent at 7/10/2023 12:01 PM EDT -----  Subject: Appointment Request    Reason for Call: Established Patient Appointment needed: Routine Medicare   AWV    QUESTIONS    Reason for appointment request? No appointments available during search     Additional Information for Provider? patient requesting a annual physical   for his employer please call patient to schedule no available appointments   showing in the call center  ---------------------------------------------------------------------------  --------------  [address] [place]  [phone]; OK to leave message on voicemail,OK to respond with electronic   message via [employer] portal (only for patients who have registered [employer]   account)  ---------------------------------------------------------------------------  --------------  SCRIPT ANSWERS

## 2023-07-25 DIAGNOSIS — H81.09 MENIERE'S DISEASE, COCHLEOVESTIBULAR, ACTIVE, UNSPECIFIED LATERALITY: Chronic | ICD-10-CM

## 2023-07-25 NOTE — TELEPHONE ENCOUNTER
[facility] since he was seen by Dr. Theodore Rizzo.       FYI  his answering machine said he is out of town until 7/31

## 2023-07-26 ENCOUNTER — TELEPHONE (OUTPATIENT)
Dept: ENT CLINIC | Age: 68
End: 2023-07-26

## 2023-07-26 NOTE — TELEPHONE ENCOUNTER
Elvia Peterson 3 minutes ago (9:22 AM)    CW  Pt. States he will be out of his medication by Aug 9. He is asking can he get a temporary refill with 10 pills until then? I inform the patient I will send the message and his assistant will give him a call back to let him know once the doctor reviews the message.

## 2023-07-26 NOTE — TELEPHONE ENCOUNTER
Pt. States he will be out of his medication by Aug 9. He is asking can he get a temporary refill with 10 pills until then? I inform the patient I will send the message and his assistant will give him a call back to let him know once the doctor reviews the message.

## 2023-07-27 NOTE — TELEPHONE ENCOUNTER
Pt.calling again to see if he can get a temporary refill on his medication until his apt.  On 8/9/23

## 2023-07-30 RX ORDER — TRIAMTERENE AND HYDROCHLOROTHIAZIDE 37.5; 25 MG/1; MG/1
CAPSULE ORAL
Qty: 30 CAPSULE | Refills: 0 | Status: SHIPPED | OUTPATIENT
Start: 2023-07-30 | End: 2023-08-09 | Stop reason: SDUPTHER

## 2023-08-09 ENCOUNTER — OFFICE VISIT (OUTPATIENT)
Dept: ENT CLINIC | Age: 68
End: 2023-08-09
Payer: COMMERCIAL

## 2023-08-09 VITALS
TEMPERATURE: 97.7 F | HEART RATE: 80 BPM | HEIGHT: 69 IN | BODY MASS INDEX: 25.77 KG/M2 | WEIGHT: 174 LBS | OXYGEN SATURATION: 97 % | SYSTOLIC BLOOD PRESSURE: 135 MMHG | DIASTOLIC BLOOD PRESSURE: 67 MMHG

## 2023-08-09 DIAGNOSIS — J34.2 NASAL SEPTAL DEVIATION: Chronic | ICD-10-CM

## 2023-08-09 DIAGNOSIS — H81.09 MENIERE'S DISEASE, COCHLEOVESTIBULAR, ACTIVE, UNSPECIFIED LATERALITY: Primary | Chronic | ICD-10-CM

## 2023-08-09 PROCEDURE — G8417 CALC BMI ABV UP PARAM F/U: HCPCS | Performed by: OTOLARYNGOLOGY

## 2023-08-09 PROCEDURE — 1036F TOBACCO NON-USER: CPT | Performed by: OTOLARYNGOLOGY

## 2023-08-09 PROCEDURE — G8427 DOCREV CUR MEDS BY ELIG CLIN: HCPCS | Performed by: OTOLARYNGOLOGY

## 2023-08-09 PROCEDURE — 1123F ACP DISCUSS/DSCN MKR DOCD: CPT | Performed by: OTOLARYNGOLOGY

## 2023-08-09 PROCEDURE — 3017F COLORECTAL CA SCREEN DOC REV: CPT | Performed by: OTOLARYNGOLOGY

## 2023-08-09 PROCEDURE — 99214 OFFICE O/P EST MOD 30 MIN: CPT | Performed by: OTOLARYNGOLOGY

## 2023-08-09 RX ORDER — TRIAMTERENE AND HYDROCHLOROTHIAZIDE 37.5; 25 MG/1; MG/1
CAPSULE ORAL
Qty: 90 CAPSULE | Refills: 3 | Status: SHIPPED | OUTPATIENT
Start: 2023-08-09

## 2023-08-09 NOTE — PROGRESS NOTES
Chief Complaint   Patient presents with    Ear Problem     Meniere's disease. HISTORY OF PRESENT ILLNESS    Tequila Burns is a 76 y.o. male who presented today for recheck and follow up of Meniere's disease. Patient stated that he has had \"No major flare ups since last visit. Couple real light ones but that's about it. \"      REVIEW OF SYSTEMS  Constitutional:  Denied fever and chills. ENT/sinus:  Denied pain and drainage. EXAMINATION    WDWN, NAD  Ears:  TMs, EACs, mastoids and pinnae were normal.    (+)  Nose:  Severe rightward NSD with severely decreased airflow. Otherwise, normal with no lesions. Sinuses:  NT x 4   Throat,  OC/ OP:  Normal with no lesions. Neck:  NT, No masses. Trachea midline. Nodes:  No lymphadenopathy. IMPRESSION / Janay Maldonado / Kathryn Page:   Valerio Linares was seen today for ear problem. Diagnoses and all orders for this visit:    Meniere's disease, cochleovestibular, active, unspecified laterality  Comments:  Well-controlled with Dyazide therapy. Orders:  -     triamterene-hydroCHLOROthiazide (DYAZIDE) 37.5-25 MG per capsule; TAKE 1 CAP BY MOUTH DAILY    Nasal septal deviation         RECOMMENDATIONS / PLAN:   Septal reconstruction and inferior turbinate reduction. Patient declined but will consider. Return in about 1 year (around 8/9/2024) for recheck/follow-up, and sooner if condition worsens.

## 2023-08-11 ENCOUNTER — OFFICE VISIT (OUTPATIENT)
Dept: FAMILY MEDICINE CLINIC | Age: 68
End: 2023-08-11
Payer: COMMERCIAL

## 2023-08-11 VITALS
TEMPERATURE: 98.9 F | RESPIRATION RATE: 12 BRPM | SYSTOLIC BLOOD PRESSURE: 130 MMHG | WEIGHT: 172.38 LBS | OXYGEN SATURATION: 98 % | HEART RATE: 89 BPM | DIASTOLIC BLOOD PRESSURE: 80 MMHG | BODY MASS INDEX: 25.53 KG/M2 | HEIGHT: 69 IN

## 2023-08-11 DIAGNOSIS — Z12.5 SCREENING PSA (PROSTATE SPECIFIC ANTIGEN): ICD-10-CM

## 2023-08-11 DIAGNOSIS — Z00.00 WELL ADULT EXAM: Primary | ICD-10-CM

## 2023-08-11 DIAGNOSIS — E78.2 MIXED HYPERLIPIDEMIA: ICD-10-CM

## 2023-08-11 PROCEDURE — 99397 PER PM REEVAL EST PAT 65+ YR: CPT | Performed by: FAMILY MEDICINE

## 2023-08-11 RX ORDER — ATORVASTATIN CALCIUM 40 MG/1
TABLET, FILM COATED ORAL
Qty: 90 TABLET | Refills: 3 | Status: SHIPPED | OUTPATIENT
Start: 2023-08-11

## 2023-08-11 SDOH — ECONOMIC STABILITY: FOOD INSECURITY: WITHIN THE PAST 12 MONTHS, THE FOOD YOU BOUGHT JUST DIDN'T LAST AND YOU DIDN'T HAVE MONEY TO GET MORE.: NEVER TRUE

## 2023-08-11 SDOH — ECONOMIC STABILITY: HOUSING INSECURITY
IN THE LAST 12 MONTHS, WAS THERE A TIME WHEN YOU DID NOT HAVE A STEADY PLACE TO SLEEP OR SLEPT IN A SHELTER (INCLUDING NOW)?: NO

## 2023-08-11 SDOH — ECONOMIC STABILITY: FOOD INSECURITY: WITHIN THE PAST 12 MONTHS, YOU WORRIED THAT YOUR FOOD WOULD RUN OUT BEFORE YOU GOT MONEY TO BUY MORE.: NEVER TRUE

## 2023-08-11 SDOH — ECONOMIC STABILITY: INCOME INSECURITY: HOW HARD IS IT FOR YOU TO PAY FOR THE VERY BASICS LIKE FOOD, HOUSING, MEDICAL CARE, AND HEATING?: NOT HARD AT ALL

## 2023-08-11 ASSESSMENT — PATIENT HEALTH QUESTIONNAIRE - PHQ9
SUM OF ALL RESPONSES TO PHQ QUESTIONS 1-9: 0
SUM OF ALL RESPONSES TO PHQ QUESTIONS 1-9: 0
SUM OF ALL RESPONSES TO PHQ9 QUESTIONS 1 & 2: 0
SUM OF ALL RESPONSES TO PHQ QUESTIONS 1-9: 0
1. LITTLE INTEREST OR PLEASURE IN DOING THINGS: 0
2. FEELING DOWN, DEPRESSED OR HOPELESS: 0
SUM OF ALL RESPONSES TO PHQ QUESTIONS 1-9: 0

## 2023-08-18 LAB
ALBUMIN SERPL-MCNC: 4.6 G/DL (ref 3.5–5.7)
ALP BLD-CCNC: 74 U/L (ref 36–125)
ALT SERPL-CCNC: 21 U/L (ref 7–52)
ANION GAP SERPL CALCULATED.3IONS-SCNC: 9 MMOL/L (ref 3–16)
AST SERPL-CCNC: 19 U/L (ref 13–39)
BILIRUB SERPL-MCNC: 0.7 MG/DL (ref 0–1.5)
BUN BLDV-MCNC: 23 MG/DL (ref 7–25)
CALCIUM SERPL-MCNC: 10 MG/DL (ref 8.6–10.3)
CHLORIDE BLD-SCNC: 103 MMOL/L (ref 98–110)
CHOLESTEROL, TOTAL: 124 MG/DL (ref 0–200)
CO2: 30 MMOL/L (ref 21–33)
CREAT SERPL-MCNC: 0.84 MG/DL (ref 0.6–1.3)
ESTIMATED GLOMERULAR FILTRATION RATE CREATININE EQUATION: >90
GLUCOSE BLD-MCNC: 105 MG/DL (ref 70–100)
HDLC SERPL-MCNC: 53 MG/DL (ref 60–92)
LDL CHOLESTEROL CALCULATED: 36 MG/DL
NON-HDL CHOLESTEROL: 71 MG/DL (ref 0–129)
OSMOLALITY CALCULATION: 298 MOSM/KG (ref 278–305)
POTASSIUM SERPL-SCNC: 3.9 MMOL/L (ref 3.5–5.3)
PROSTATE SPECIFIC ANTIGEN: 5.06 NG/ML (ref 0–4)
SODIUM BLD-SCNC: 142 MMOL/L (ref 133–146)
TOTAL PROTEIN: 6.9 G/DL (ref 6.4–8.9)
TRIGL SERPL-MCNC: 176 MG/DL (ref 10–149)

## 2023-09-05 ENCOUNTER — TELEPHONE (OUTPATIENT)
Dept: FAMILY MEDICINE CLINIC | Age: 68
End: 2023-09-05

## 2023-09-05 DIAGNOSIS — R97.20 ELEVATED PSA: Primary | ICD-10-CM

## 2023-09-05 NOTE — TELEPHONE ENCOUNTER
----- Message from Melvi Sandoval MD sent at 8/29/2023  7:22 AM EDT -----  PSA value has gone up  over the last year.   Would recommend urology consultation

## 2023-09-07 NOTE — TELEPHONE ENCOUNTER
Discussed PSA elevation from 3.65 up to over 5 in 1 year. Recommendation for urology consultation and patient agrees at this time.

## 2024-05-14 ENCOUNTER — TELEPHONE (OUTPATIENT)
Dept: FAMILY MEDICINE CLINIC | Age: 69
End: 2024-05-14

## 2024-05-14 DIAGNOSIS — Z12.5 PROSTATE CANCER SCREENING: ICD-10-CM

## 2024-05-14 DIAGNOSIS — E78.2 MIXED HYPERLIPIDEMIA: Primary | ICD-10-CM

## 2024-05-14 NOTE — TELEPHONE ENCOUNTER
Patient called in to schedule a physical required once a year for their job.    Pt was told the earliest availability for an appt with  is September 5th via the DR's schedule.    Pt became upset and directed me to have  give them a call.    \"He better be giving me a call\" pt stated.      Pt last physical was 8/11/23..    Since pts last physical was in the last year the Doctors completion of pts physical form is asked to be considered...    Physical still has to be scheduled for the year..    PLEASE ADVISE..    Callback number:[phone]

## 2024-07-08 ENCOUNTER — OFFICE VISIT (OUTPATIENT)
Dept: FAMILY MEDICINE CLINIC | Age: 69
End: 2024-07-08
Payer: COMMERCIAL

## 2024-07-08 VITALS
OXYGEN SATURATION: 96 % | RESPIRATION RATE: 12 BRPM | DIASTOLIC BLOOD PRESSURE: 72 MMHG | TEMPERATURE: 98.1 F | BODY MASS INDEX: 26.01 KG/M2 | WEIGHT: 176.13 LBS | SYSTOLIC BLOOD PRESSURE: 118 MMHG | HEART RATE: 75 BPM

## 2024-07-08 DIAGNOSIS — L84 CALLUS OF FOOT: Primary | ICD-10-CM

## 2024-07-08 PROCEDURE — G8417 CALC BMI ABV UP PARAM F/U: HCPCS | Performed by: FAMILY MEDICINE

## 2024-07-08 PROCEDURE — G8428 CUR MEDS NOT DOCUMENT: HCPCS | Performed by: FAMILY MEDICINE

## 2024-07-08 PROCEDURE — 1036F TOBACCO NON-USER: CPT | Performed by: FAMILY MEDICINE

## 2024-07-08 PROCEDURE — 1123F ACP DISCUSS/DSCN MKR DOCD: CPT | Performed by: FAMILY MEDICINE

## 2024-07-08 PROCEDURE — 3017F COLORECTAL CA SCREEN DOC REV: CPT | Performed by: FAMILY MEDICINE

## 2024-07-08 PROCEDURE — 99213 OFFICE O/P EST LOW 20 MIN: CPT | Performed by: FAMILY MEDICINE

## 2024-07-08 NOTE — PROGRESS NOTES
Subjective   Patient ID: Henri Sellers is a 69 y.o. male.  CC: Patient presents for acute medical problem-left foot pain. Medical assistant notes reviewed.    Foot Pain   The pain is present in the left foot. This is a new problem. Episode onset: 3 weeks. There has been no history of extremity trauma. The problem occurs constantly. The problem has been rapidly worsening. The quality of the pain is described as aching. The pain is at a severity of 5/10. The pain is moderate. The symptoms are aggravated by activity. He has tried nothing for the symptoms.   Patient has been trying to put a needle in this area at home and a bandage with no improvement.    Review of Systems     Allergies   Allergen Reactions    Seasonal      sneezing        Objective   Physical Exam  Vitals and nursing note reviewed.   Constitutional:       General: He is not in acute distress.     Appearance: He is well-developed.   Skin:     General: Skin is warm.      Comments: Lateral aspect of left foot by the fifth MP joint is consistent with a callus.  This area was trimmed and no foreign body noted.  Bandage was applied.   Neurological:      Mental Status: He is alert.   Psychiatric:         Behavior: Behavior is cooperative.            Assessment   Henri was seen today for foot pain.    Diagnoses and all orders for this visit:    Callus of foot            Plan   Advised at this point of time to wear a corn pad or bandage over the area for the next 2 weeks    RTC as needed    Please note that this chart was generated using Dragon dictation software. Although every effort was made to ensure the accuracy of this automated transcription, some errors in transcription may have occurred.

## 2024-08-09 LAB
ALBUMIN: 4.2 G/DL (ref 3.5–5.7)
ALP BLD-CCNC: 70 U/L (ref 36–125)
ALT SERPL-CCNC: 22 U/L (ref 7–52)
ANION GAP SERPL CALCULATED.3IONS-SCNC: 7 MMOL/L (ref 3–16)
AST SERPL-CCNC: 20 U/L (ref 13–39)
BILIRUB SERPL-MCNC: 0.7 MG/DL (ref 0–1.5)
BUN BLDV-MCNC: 21 MG/DL (ref 7–25)
CALCIUM SERPL-MCNC: 9 MG/DL (ref 8.6–10.3)
CHLORIDE BLD-SCNC: 103 MMOL/L (ref 98–110)
CHOLESTEROL, TOTAL: 114 MG/DL (ref 0–200)
CO2: 31 MMOL/L (ref 21–33)
CREAT SERPL-MCNC: 0.9 MG/DL (ref 0.6–1.3)
ESTIMATED GLOMERULAR FILTRATION RATE CREATININE EQUATION: >90
GLUCOSE BLD-MCNC: 90 MG/DL (ref 70–100)
HDLC SERPL-MCNC: 47 MG/DL (ref 60–92)
LDL CHOLESTEROL: 43 MG/DL
NON-HDL CHOLESTEROL: 67 MG/DL (ref 0–129)
OSMOLALITY CALCULATION: 295 MOSM/KG (ref 278–305)
POTASSIUM SERPL-SCNC: 4.2 MMOL/L (ref 3.5–5.3)
PROSTATE SPECIFIC ANTIGEN: 6.17 NG/ML (ref 0–4)
SODIUM BLD-SCNC: 141 MMOL/L (ref 133–146)
TOTAL PROTEIN: 7 G/DL (ref 6.4–8.9)
TRIGL SERPL-MCNC: 119 MG/DL (ref 10–149)

## 2024-08-12 ENCOUNTER — OFFICE VISIT (OUTPATIENT)
Dept: ENT CLINIC | Age: 69
End: 2024-08-12
Payer: COMMERCIAL

## 2024-08-12 VITALS
BODY MASS INDEX: 26.51 KG/M2 | SYSTOLIC BLOOD PRESSURE: 137 MMHG | WEIGHT: 179 LBS | DIASTOLIC BLOOD PRESSURE: 71 MMHG | OXYGEN SATURATION: 96 % | TEMPERATURE: 97.9 F | HEART RATE: 79 BPM | HEIGHT: 69 IN

## 2024-08-12 DIAGNOSIS — J34.2 NASAL SEPTAL DEVIATION: Chronic | ICD-10-CM

## 2024-08-12 DIAGNOSIS — H81.09 MENIERE'S DISEASE, COCHLEOVESTIBULAR, ACTIVE, UNSPECIFIED LATERALITY: Primary | Chronic | ICD-10-CM

## 2024-08-12 PROCEDURE — 99214 OFFICE O/P EST MOD 30 MIN: CPT | Performed by: OTOLARYNGOLOGY

## 2024-08-12 PROCEDURE — G8417 CALC BMI ABV UP PARAM F/U: HCPCS | Performed by: OTOLARYNGOLOGY

## 2024-08-12 PROCEDURE — 1123F ACP DISCUSS/DSCN MKR DOCD: CPT | Performed by: OTOLARYNGOLOGY

## 2024-08-12 PROCEDURE — 3017F COLORECTAL CA SCREEN DOC REV: CPT | Performed by: OTOLARYNGOLOGY

## 2024-08-12 PROCEDURE — 1036F TOBACCO NON-USER: CPT | Performed by: OTOLARYNGOLOGY

## 2024-08-12 PROCEDURE — G8427 DOCREV CUR MEDS BY ELIG CLIN: HCPCS | Performed by: OTOLARYNGOLOGY

## 2024-08-12 RX ORDER — TRIAMTERENE AND HYDROCHLOROTHIAZIDE 37.5; 25 MG/1; MG/1
CAPSULE ORAL
Qty: 90 CAPSULE | Refills: 3 | Status: SHIPPED | OUTPATIENT
Start: 2024-08-12

## 2024-08-12 NOTE — PROGRESS NOTES
CHIEF COMPLAINT  Chief Complaint   Patient presents with    Meniere's disease       HISTORY OF PRESENT ILLNESS    Henri Sellers is a 69 y.o. male here for recheck and follow up of Meniere's disease.  the above chief complaint.  Patient stated that he has had no flare ups of his Ménière's disease since the last visit here.  He denied nasal dyspnea and feels that he breathes \"well enough\" through both sides of his nose.  He stated he has no interest in septal reconstruction and turbinate reduction surgery.  He denied any sinus infections diagnosed and treated since the last visit here.       REVIEW OF SYSTEMS  Constitutional:  Denied fever.  ENT/sinus:  Denied otalgia, otorrhea, nasal pain, rhinorrhea, sore throat, and sinus/facial pain.        EXAMINATION    Vitals:    08/12/24 1255   BP: 137/71   Site: Left Upper Arm   Position: Sitting   Cuff Size: Medium Adult   Pulse: 79   Temp: 97.9 °F (36.6 °C)   TempSrc: Infrared   SpO2: 96%   Weight: 81.2 kg (179 lb)   Height: 1.753 m (5' 9.02\")     WDWN, NAD  Face:  Normal skin.    Voice:  Normal, with no hoarseness, breathiness, or hot potato quality.  Ears:   TMs and EACs were normal.  The mastoids and pinnae were normal.    Nose:  Normal.    Sinuses: Nontender x 4   Throat,  OC/OP:  Normal.    Neck:  NT, No masses.  Trachea midline.    Nodes:  No lymphadenopathy.     Thyroid:  Normal       I performed this physical exam personally.        IMPRESSION / DIAGNOSES / ORDERS:   Henri was seen today for meniere's disease.    Diagnoses and all orders for this visit:    Nasal septal deviation    Meniere's disease, cochleovestibular, active, unspecified laterality  Comments:  Well-controlled with Dyazide therapy.  Orders:  -     triamterene-hydroCHLOROthiazide (DYAZIDE) 37.5-25 MG per capsule; TAKE 1 CAP BY MOUTH DAILY         RECOMMENDATIONS / PLAN:   Prescription drug management: Since it has been effective treatment, I am continuing hydrochlorothiazide/triamterene

## 2024-08-22 ENCOUNTER — OFFICE VISIT (OUTPATIENT)
Dept: FAMILY MEDICINE CLINIC | Age: 69
End: 2024-08-22
Payer: COMMERCIAL

## 2024-08-22 VITALS
DIASTOLIC BLOOD PRESSURE: 82 MMHG | TEMPERATURE: 97.9 F | OXYGEN SATURATION: 98 % | BODY MASS INDEX: 25.89 KG/M2 | WEIGHT: 175.38 LBS | RESPIRATION RATE: 12 BRPM | SYSTOLIC BLOOD PRESSURE: 132 MMHG | HEART RATE: 73 BPM

## 2024-08-22 DIAGNOSIS — S39.012A STRAIN OF LUMBAR REGION, INITIAL ENCOUNTER: ICD-10-CM

## 2024-08-22 DIAGNOSIS — R97.20 ELEVATED PSA: ICD-10-CM

## 2024-08-22 DIAGNOSIS — Z00.00 WELL ADULT EXAM: Primary | ICD-10-CM

## 2024-08-22 DIAGNOSIS — E78.2 MIXED HYPERLIPIDEMIA: ICD-10-CM

## 2024-08-22 DIAGNOSIS — N52.8 OTHER MALE ERECTILE DYSFUNCTION: ICD-10-CM

## 2024-08-22 DIAGNOSIS — M15.9 PRIMARY OSTEOARTHRITIS INVOLVING MULTIPLE JOINTS: ICD-10-CM

## 2024-08-22 PROCEDURE — 90715 TDAP VACCINE 7 YRS/> IM: CPT | Performed by: FAMILY MEDICINE

## 2024-08-22 PROCEDURE — 90471 IMMUNIZATION ADMIN: CPT | Performed by: FAMILY MEDICINE

## 2024-08-22 PROCEDURE — 99397 PER PM REEVAL EST PAT 65+ YR: CPT | Performed by: FAMILY MEDICINE

## 2024-08-22 RX ORDER — ATORVASTATIN CALCIUM 40 MG/1
TABLET, FILM COATED ORAL
Qty: 90 TABLET | Refills: 3 | Status: SHIPPED | OUTPATIENT
Start: 2024-08-22

## 2024-08-22 SDOH — ECONOMIC STABILITY: FOOD INSECURITY: WITHIN THE PAST 12 MONTHS, YOU WORRIED THAT YOUR FOOD WOULD RUN OUT BEFORE YOU GOT MONEY TO BUY MORE.: NEVER TRUE

## 2024-08-22 SDOH — ECONOMIC STABILITY: FOOD INSECURITY: WITHIN THE PAST 12 MONTHS, THE FOOD YOU BOUGHT JUST DIDN'T LAST AND YOU DIDN'T HAVE MONEY TO GET MORE.: NEVER TRUE

## 2024-08-22 SDOH — ECONOMIC STABILITY: INCOME INSECURITY: HOW HARD IS IT FOR YOU TO PAY FOR THE VERY BASICS LIKE FOOD, HOUSING, MEDICAL CARE, AND HEATING?: NOT HARD AT ALL

## 2024-08-22 ASSESSMENT — PATIENT HEALTH QUESTIONNAIRE - PHQ9
1. LITTLE INTEREST OR PLEASURE IN DOING THINGS: NOT AT ALL
2. FEELING DOWN, DEPRESSED OR HOPELESS: NOT AT ALL
SUM OF ALL RESPONSES TO PHQ QUESTIONS 1-9: 0
SUM OF ALL RESPONSES TO PHQ9 QUESTIONS 1 & 2: 0
SUM OF ALL RESPONSES TO PHQ QUESTIONS 1-9: 0

## 2024-08-22 NOTE — PROGRESS NOTES
History and Physical      Henri Sellers  YOB: 1955    Date of Service:  8/22/2024    Chief Complaint:   Henri Sellers is a 69 y.o. male who  presents for physical examination.    HPI: Patient presents for physical examination review of his chronic health issues.  Overall he feels he is doing well other than having some low back stiffness.  He is also concerned about some bulging over his prior inguinal hernia site on the right side.  This was repaired over 10 years ago.  He is doing a lot of exercises but not doing a lot of stretching exercises.  He is doing more weightlifting than anything else.  He does have nocturia x 2 and some daytime urgency but denies any severe symptoms related to prostate.  He is concerned that a coworker recently was diagnosed with liver cancer.  He would also like to know his blood type.    Wt Readings from Last 3 Encounters:   08/22/24 79.5 kg (175 lb 6 oz)   08/12/24 81.2 kg (179 lb)   07/08/24 79.9 kg (176 lb 2 oz)     BP Readings from Last 3 Encounters:   08/22/24 132/82   08/12/24 137/71   07/08/24 118/72       Patient Active Problem List   Diagnosis    Tinnitus    Meniere's disease, cochleovestibular, active    Mixed hyperlipidemia    Primary osteoarthritis involving multiple joints    Other male erectile dysfunction       Allergies   Allergen Reactions    Seasonal      sneezing     Outpatient Medications Marked as Taking for the 8/22/24 encounter (Office Visit) with Leobardo Herrera MD   Medication Sig Dispense Refill    triamterene-hydroCHLOROthiazide (DYAZIDE) 37.5-25 MG per capsule TAKE 1 CAP BY MOUTH DAILY 90 capsule 3    atorvastatin (LIPITOR) 40 MG tablet TAKE 1 TABLET BY MOUTH EVERY DAY 90 tablet 3    sildenafil (VIAGRA) 100 MG tablet Take 1 tablet by mouth daily as needed for Erectile Dysfunction 10 tablet 5       Past Medical History:   Diagnosis Date    Hyperlipidemia     Impotence of organic origin     Meniere's disease     takes dyazide     Past

## 2024-08-22 NOTE — PROGRESS NOTES
Immunization(s) given during visit:     Immunizations Administered       Name Date Dose Route    TDaP, ADACEL (age 10y-64y), BOOSTRIX (age 10y+), IM, 0.5mL 8/22/2024 0.5 mL Intramuscular    Site: Deltoid- Right    Lot: [id number]    NDC: 49285-465-82             Patient instructed to remain in clinic for 20 minutes after injection and was advised to report any adverse reaction to me immediately.

## 2024-09-03 ENCOUNTER — OFFICE VISIT (OUTPATIENT)
Dept: FAMILY MEDICINE CLINIC | Age: 69
End: 2024-09-03
Payer: COMMERCIAL

## 2024-09-03 VITALS
SYSTOLIC BLOOD PRESSURE: 122 MMHG | OXYGEN SATURATION: 98 % | WEIGHT: 174.5 LBS | DIASTOLIC BLOOD PRESSURE: 76 MMHG | BODY MASS INDEX: 25.76 KG/M2 | HEART RATE: 81 BPM | TEMPERATURE: 97.8 F | RESPIRATION RATE: 12 BRPM

## 2024-09-03 DIAGNOSIS — H69.92 EUSTACHIAN TUBE DYSFUNCTION, LEFT: Primary | ICD-10-CM

## 2024-09-03 PROCEDURE — G8428 CUR MEDS NOT DOCUMENT: HCPCS | Performed by: FAMILY MEDICINE

## 2024-09-03 PROCEDURE — 99213 OFFICE O/P EST LOW 20 MIN: CPT | Performed by: FAMILY MEDICINE

## 2024-09-03 PROCEDURE — G8417 CALC BMI ABV UP PARAM F/U: HCPCS | Performed by: FAMILY MEDICINE

## 2024-09-03 PROCEDURE — 3017F COLORECTAL CA SCREEN DOC REV: CPT | Performed by: FAMILY MEDICINE

## 2024-09-03 PROCEDURE — 1036F TOBACCO NON-USER: CPT | Performed by: FAMILY MEDICINE

## 2024-09-03 PROCEDURE — 1123F ACP DISCUSS/DSCN MKR DOCD: CPT | Performed by: FAMILY MEDICINE

## 2024-09-03 RX ORDER — AMOXICILLIN 500 MG/1
1000 CAPSULE ORAL 2 TIMES DAILY
Qty: 28 CAPSULE | Refills: 0 | Status: SHIPPED | OUTPATIENT
Start: 2024-09-03 | End: 2024-09-10

## 2024-09-03 NOTE — PROGRESS NOTES
Subjective   Patient ID: Henri Sellers is a 69 y.o. male.  CC: Patient presents for acute medical problem-loss of hearing left ear. Medical assistant notes reviewed.    Ear Fullness   There is pain in the left ear. This is a new problem. Episode onset: Wednesday. The problem has been rapidly worsening. There has been no fever. The pain is at a severity of 1/10. The patient is experiencing no pain. Treatments tried: decongestant. The treatment provided no relief.   Patient states this started after he was skiing and water was flushed up into his left nostril.  Does have some nasal congestion which is longstanding.  He denies any fevers or chills.    Review of Systems       Objective   Physical Exam  Vitals reviewed.   Constitutional:       General: He is not in acute distress.     Appearance: He is well-developed.   HENT:      Right Ear: Tympanic membrane normal.      Left Ear: Tympanic membrane is retracted.      Nose: Mucosal edema, congestion and rhinorrhea present.      Right Sinus: No maxillary sinus tenderness or frontal sinus tenderness.      Left Sinus: No maxillary sinus tenderness or frontal sinus tenderness.   Musculoskeletal:      Cervical back: Neck supple.   Lymphadenopathy:      Cervical: No cervical adenopathy.   Neurological:      Mental Status: He is alert.   Psychiatric:         Behavior: Behavior is cooperative.            Assessment   Henri was seen today for ear fullness.    Diagnoses and all orders for this visit:    Eustachian tube dysfunction, left    Other orders  -     amoxicillin (AMOXIL) 500 MG capsule; Take 2 capsules by mouth 2 times daily for 7 days            Plan   Recommend patient start using Afrin nasal spray twice daily for the next 3 days  Start antibiotic therapy  RTC as needed    Please note that this chart was generated using Dragon dictation software. Although every effort was made to ensure the accuracy of this automated transcription, some errors in transcription

## 2024-09-10 ENCOUNTER — OFFICE VISIT (OUTPATIENT)
Dept: FAMILY MEDICINE CLINIC | Age: 69
End: 2024-09-10
Payer: COMMERCIAL

## 2024-09-10 VITALS
TEMPERATURE: 97.2 F | RESPIRATION RATE: 12 BRPM | WEIGHT: 174 LBS | HEART RATE: 77 BPM | OXYGEN SATURATION: 98 % | SYSTOLIC BLOOD PRESSURE: 130 MMHG | DIASTOLIC BLOOD PRESSURE: 84 MMHG | BODY MASS INDEX: 25.68 KG/M2

## 2024-09-10 DIAGNOSIS — H69.92 DYSFUNCTION OF LEFT EUSTACHIAN TUBE: Primary | ICD-10-CM

## 2024-09-10 PROCEDURE — G8428 CUR MEDS NOT DOCUMENT: HCPCS | Performed by: FAMILY MEDICINE

## 2024-09-10 PROCEDURE — 99213 OFFICE O/P EST LOW 20 MIN: CPT | Performed by: FAMILY MEDICINE

## 2024-09-10 PROCEDURE — 3017F COLORECTAL CA SCREEN DOC REV: CPT | Performed by: FAMILY MEDICINE

## 2024-09-10 PROCEDURE — 1036F TOBACCO NON-USER: CPT | Performed by: FAMILY MEDICINE

## 2024-09-10 PROCEDURE — 1123F ACP DISCUSS/DSCN MKR DOCD: CPT | Performed by: FAMILY MEDICINE

## 2024-09-10 PROCEDURE — G8417 CALC BMI ABV UP PARAM F/U: HCPCS | Performed by: FAMILY MEDICINE

## 2024-09-10 RX ORDER — PREDNISONE 20 MG/1
TABLET ORAL
Qty: 15 TABLET | Refills: 0 | Status: SHIPPED | OUTPATIENT
Start: 2024-09-10

## 2024-09-10 RX ORDER — AMOXICILLIN 500 MG/1
1000 CAPSULE ORAL 2 TIMES DAILY
Qty: 28 CAPSULE | Refills: 0 | Status: SHIPPED | OUTPATIENT
Start: 2024-09-10 | End: 2024-09-17

## 2024-10-03 ENCOUNTER — OFFICE VISIT (OUTPATIENT)
Dept: ENT CLINIC | Age: 69
End: 2024-10-03
Payer: COMMERCIAL

## 2024-10-03 VITALS
OXYGEN SATURATION: 98 % | HEART RATE: 80 BPM | BODY MASS INDEX: 26.22 KG/M2 | WEIGHT: 177 LBS | HEIGHT: 69 IN | TEMPERATURE: 98.2 F | SYSTOLIC BLOOD PRESSURE: 126 MMHG | DIASTOLIC BLOOD PRESSURE: 72 MMHG

## 2024-10-03 DIAGNOSIS — H61.22 IMPACTED CERUMEN OF LEFT EAR: ICD-10-CM

## 2024-10-03 DIAGNOSIS — H91.92 HEARING LOSS OF LEFT EAR, UNSPECIFIED HEARING LOSS TYPE: Primary | ICD-10-CM

## 2024-10-03 DIAGNOSIS — H90.A12 CONDUCTIVE HEARING LOSS OF LEFT EAR WITH RESTRICTED HEARING OF RIGHT EAR: ICD-10-CM

## 2024-10-03 PROCEDURE — G8427 DOCREV CUR MEDS BY ELIG CLIN: HCPCS | Performed by: OTOLARYNGOLOGY

## 2024-10-03 PROCEDURE — G8484 FLU IMMUNIZE NO ADMIN: HCPCS | Performed by: OTOLARYNGOLOGY

## 2024-10-03 PROCEDURE — 69210 REMOVE IMPACTED EAR WAX UNI: CPT | Performed by: OTOLARYNGOLOGY

## 2024-10-03 PROCEDURE — G8417 CALC BMI ABV UP PARAM F/U: HCPCS | Performed by: OTOLARYNGOLOGY

## 2024-10-03 PROCEDURE — 3017F COLORECTAL CA SCREEN DOC REV: CPT | Performed by: OTOLARYNGOLOGY

## 2024-10-03 PROCEDURE — 1123F ACP DISCUSS/DSCN MKR DOCD: CPT | Performed by: OTOLARYNGOLOGY

## 2024-10-03 PROCEDURE — 99212 OFFICE O/P EST SF 10 MIN: CPT | Performed by: OTOLARYNGOLOGY

## 2024-10-03 PROCEDURE — 1036F TOBACCO NON-USER: CPT | Performed by: OTOLARYNGOLOGY

## 2024-10-03 NOTE — PROGRESS NOTES
HISTORY  Patient went to see Dr. Herrera about one month ago.  Dr. Blackwell said that the left TM was \"sucked in and treated it with prednisone.  Still had trouble and went back one week later and said it looked a little better but still can't hear out of it.  More prednisone and antibiotic.   Still having trouble hearing in the left ear.  Sound like when you get water in your ear and it won't come out.  Onset 3 days after water skiing.  No other ENTS problems now       EXAMINATION  WDWN, NAD   Ears:  AS:  Multiple hairs and cerumen accumulation in left external auditory canals, occluding the canal and obscuring visualization of the tympanic membrane. Removed with ear suction and irrigation with hydrogen peroxide.  The tympanic membrane was normal with no erythema or middle ear effusion and was normally mobile to pneumatic massage.  External auditory canals otherwise normal.        IMPRESSION / DIAGNOSES / ORDERS   Diagnoses and all orders for this visit:    Hearing loss of left ear, unspecified hearing loss type  -     [facility]         RECOMMENDATIONS / PLAN   Audiogram.  Return for recheck/follow-up after hearing test.       Refill request for pantoprazole  medication.      [facility]- [facility]      Last visit - 9-     Pending visit - 9-    Last refill -9-      Medication Contract signed -   Malachi croft-         Additional Comments

## 2024-10-30 ENCOUNTER — OFFICE VISIT (OUTPATIENT)
Dept: ENT CLINIC | Age: 69
End: 2024-10-30
Payer: COMMERCIAL

## 2024-10-30 ENCOUNTER — PROCEDURE VISIT (OUTPATIENT)
Dept: AUDIOLOGY | Age: 69
End: 2024-10-30
Payer: COMMERCIAL

## 2024-10-30 VITALS
DIASTOLIC BLOOD PRESSURE: 80 MMHG | TEMPERATURE: 97.6 F | BODY MASS INDEX: 25.33 KG/M2 | HEIGHT: 69 IN | HEART RATE: 90 BPM | WEIGHT: 171 LBS | SYSTOLIC BLOOD PRESSURE: 161 MMHG | OXYGEN SATURATION: 98 %

## 2024-10-30 DIAGNOSIS — H90.3 ASYMMETRICAL SENSORINEURAL HEARING LOSS: Primary | Chronic | ICD-10-CM

## 2024-10-30 DIAGNOSIS — H93.13 TINNITUS OF BOTH EARS: ICD-10-CM

## 2024-10-30 DIAGNOSIS — H90.3 SENSORINEURAL HEARING LOSS, BILATERAL: Primary | ICD-10-CM

## 2024-10-30 DIAGNOSIS — H90.A32 MIXED CONDUCTIVE AND SENSORINEURAL HEARING LOSS OF LEFT EAR WITH RESTRICTED HEARING OF RIGHT EAR: Chronic | ICD-10-CM

## 2024-10-30 DIAGNOSIS — H81.09 MENIERE'S DISEASE, COCHLEOVESTIBULAR, ACTIVE, UNSPECIFIED LATERALITY: Chronic | ICD-10-CM

## 2024-10-30 DIAGNOSIS — H90.5 HIGH FREQUENCY SENSORINEURAL HEARING LOSS OF RIGHT EAR: Chronic | ICD-10-CM

## 2024-10-30 PROCEDURE — 99213 OFFICE O/P EST LOW 20 MIN: CPT | Performed by: OTOLARYNGOLOGY

## 2024-10-30 PROCEDURE — 1036F TOBACCO NON-USER: CPT | Performed by: OTOLARYNGOLOGY

## 2024-10-30 PROCEDURE — 1123F ACP DISCUSS/DSCN MKR DOCD: CPT | Performed by: OTOLARYNGOLOGY

## 2024-10-30 PROCEDURE — G8427 DOCREV CUR MEDS BY ELIG CLIN: HCPCS | Performed by: OTOLARYNGOLOGY

## 2024-10-30 PROCEDURE — 3017F COLORECTAL CA SCREEN DOC REV: CPT | Performed by: OTOLARYNGOLOGY

## 2024-10-30 PROCEDURE — 92567 TYMPANOMETRY: CPT

## 2024-10-30 PROCEDURE — G8484 FLU IMMUNIZE NO ADMIN: HCPCS | Performed by: OTOLARYNGOLOGY

## 2024-10-30 PROCEDURE — G8417 CALC BMI ABV UP PARAM F/U: HCPCS | Performed by: OTOLARYNGOLOGY

## 2024-10-30 PROCEDURE — 92557 COMPREHENSIVE HEARING TEST: CPT

## 2024-10-30 NOTE — PROGRESS NOTES
[facility]   DIVISION OF OTOLARYNGOLOGY- HEAD & NECK SURGERY  [facility]         Chief Complaint   Patient presents with    Hearing Loss       HISTORY       Henri Sellers is a 69 y.o. male here for follow up of hearing loss.          EXAMINATION   WDWN, NAD  Ears: TM and EACs appeared to be normal.          AUDIOGRAM  (10/30/2024):  I reviewed the results of the audiogram, showing bilateral sensorineural hearing loss.  There was a left borderline to severe high-frequency sensorineural hearing loss and left mild low-frequency mixed hearing loss, and right moderate high-frequency sensorineural hearing loss.          COUNSELING    Audiogram results were reviewed and discussed with Henri.  I advised of type and severity of hearing loss and the probable etiology of hearing loss of aging (presbycusis).  I discussed the possible associated impairment.  I advised of the need for avoidance of prolonged or frequent exposure to excessive noise and the need for noise protection, if such exposure is unavoidable.  I discussed the possible benefits of hearing aids, or other amplification therapy.  I discussed the possible etiology of tinnitus and treatment/coping measures including masking techniques.  I advised of the need for annual and prn hearing tests.  Patient was advised of the greater decrease in word and speech understanding in the presence of background noise and of the expected difficulty understanding speech in the presence of moderate to high volume background noise associated with this hearing loss.  I discussed acoustic neuroma (vestibular schwannoma), signs, symptoms, possible progression with loss of hearing, dizziness, facial nerve paralysis and compression of brain stem, and the need for an MRI scan of the IACs/brain to investigate for this entity.         IMPRESSION / DIAGNOSES / ORDERS / PROCEDURES    Henri was seen today for hearing loss.    Diagnoses and all orders for this

## 2024-10-30 NOTE — PATIENT INSTRUCTIONS
static) can be very helpful.  You should avoid exposure to excessively high levels of noise/sound and use hearing protection measures we discussed, as needed, if such exposure is unavoidable.  You may consider the Tinnitus Program through [facility] for problematic tinnitus.    NO Q-TIPS IN THE EARS  You should never clean your ears with a Q-tip, cotton tipped applicator, Millie pin, paper clip, pen cap, nail file, or any other instrument.  I recommend only use of one the several ear wax removal kits available \"over the counter\" (eg. B[employer]ch [employer] Lomb or Murine, or [employer]) if you feel a need to try to remove ear wax.  No other methods should be self used for this purpose as there is danger of injury to the ear and risk of irreparable and irreversible permanent hearing loss and permanent dizziness.     
Alert-The patient is alert, awake and responds to voice. The patient is oriented to time, place, and person. The triage nurse is able to obtain subjective information.

## 2024-10-30 NOTE — PROGRESS NOTES
Henri Sellers   1955, 69 y.o. male   [id number]       Referring Provider: Jeff Nation MD  Referral Type: In an order in Epic    Reason for Visit: Evaluation of suspected change in hearing, tinnitus, or balance.    ADULT AUDIOLOGIC EVALUATION      Henri Sellers is a 69 y.o. male seen today, 10/30/2024 , for an initial audiologic evaluation.  Patient was seen by Jeff Nation MD following today's evaluation.    AUDIOLOGIC AND OTHER PERTINENT MEDICAL HISTORY:      Henri Sellers reports left ear hearing has improved since last visit, but has not returned to baseline. HE admits history of bilateral tinnitus for several years and has history of Meniere's diagnosis. Has not had dizziness since being put on diazide. Admits to recreational noise exposure riding motorcycle.    Previous case history from ENT visit on 10/3/24:  Patient went to see Dr. Herrera about one month ago.  Dr. Blackwell said that the left TM was \"sucked in and treated it with prednisone.  Still had trouble and went back one week later and said it looked a little better but still can't hear out of it.  More prednisone and antibiotic.   Still having trouble hearing in the left ear.  Sound like when you get water in your ear and it won't come out.  Onset 3 days after water skiing.  No other ENTS problems now   He denied otalgia, otorrhea, dizziness, imbalance, history of falls, and family history of hearing loss    Date: 10/30/2024     IMPRESSIONS:      Today's results revealed asymmetric sensorineural hearing loss, left worse than the right. Excellent speech understanding when in quiet, bilaterally. Tympanometry indicates normal middle ear function. Discussed test results and implications with patient. Discussed benefits of amplification pending medical clearance. Discussed use of tinnitus management strategies. Hearing aids recommended at this time.  Follow medical recommendations of Jeff Nation MD.     ASSESSMENT AND FINDINGS:

## 2024-10-30 NOTE — PATIENT INSTRUCTIONS
noise to help you sleep.  Background noise may cover up the noise that you hear in your ears.  You can buy a tabletop machine or a device that sits under your pillow to play soothing sounds, like ocean waves.  Smart phones have free apps, such as Whist, Relax Melodies, ReSound Relief, and White Noise Lite.  These apps have different types of sounds/noise, some of which you can blend together to find sounds that are most soothing to you.  Hearing aid technology, especially when there is some hearing loss, may help reduce tinnitus symptoms by giving your brain better access to the sounds it is missing.  There are some hearing aids with built-in noise generator programs, which may help when amplification alone is not enough.        Additional resources may be found through the [employer] at www.mike.org    When should you call for help?  Call 911 anytime you think you may need emergency care. For example, call if:    You have symptoms of a stroke. These may include:  Sudden numbness, tingling, weakness, or loss of movement in your face, arm, or leg, especially on only one side of your body.  Sudden vision changes.  Sudden trouble speaking.  Sudden confusion or trouble understanding simple statements.  Sudden problems with walking or balance.  A sudden, severe headache that is different from past headaches.    Call your doctor now or seek immediate medical care if:    You develop other symptoms. These may include hearing loss (or worse hearing loss), balance problems, dizziness, nausea, or vomiting.    Watch closely for changes in your health, and be sure to contact your doctor if:    Your tinnitus moves from both ears to one ear.     Your hearing loss gets worse within 1 day after an ear injury.     Your tinnitus or hearing loss does not get better within 1 week after an ear injury.     Your tinnitus bothers you enough that you want to take medicines to help you cope with it.      If you notice

## 2024-11-14 ENCOUNTER — TELEPHONE (OUTPATIENT)
Dept: FAMILY MEDICINE CLINIC | Age: 69
End: 2024-11-14

## 2024-11-14 RX ORDER — PREDNISONE 20 MG/1
TABLET ORAL
Qty: 15 TABLET | Refills: 0 | Status: SHIPPED | OUTPATIENT
Start: 2024-11-14

## 2024-11-14 RX ORDER — AMOXICILLIN 500 MG/1
1000 CAPSULE ORAL 2 TIMES DAILY
Qty: 28 CAPSULE | Refills: 0 | Status: SHIPPED | OUTPATIENT
Start: 2024-11-14 | End: 2024-11-21

## 2024-11-14 NOTE — TELEPHONE ENCOUNTER
Patient was in a couple months ago for left ear issue, was given ATB and Prednisone and it seemed to clear it up.  A couple days ago started again with pain/pressure/hard time hearing, taking decongestants, not working .   Asking if he can get refills of ATB and Prednisone or he will make an appt if you need him to.         [facility] [address] in [place] [facility]

## 2024-11-27 ENCOUNTER — OFFICE VISIT (OUTPATIENT)
Dept: ENT CLINIC | Age: 69
End: 2024-11-27
Payer: COMMERCIAL

## 2024-11-27 VITALS
HEIGHT: 69 IN | TEMPERATURE: 97.5 F | HEART RATE: 74 BPM | DIASTOLIC BLOOD PRESSURE: 77 MMHG | OXYGEN SATURATION: 97 % | BODY MASS INDEX: 26.36 KG/M2 | WEIGHT: 178 LBS | SYSTOLIC BLOOD PRESSURE: 149 MMHG

## 2024-11-27 DIAGNOSIS — H90.3 ASYMMETRICAL SENSORINEURAL HEARING LOSS: ICD-10-CM

## 2024-11-27 DIAGNOSIS — H81.09 MENIERE'S DISEASE, COCHLEOVESTIBULAR, ACTIVE, UNSPECIFIED LATERALITY: Primary | ICD-10-CM

## 2024-11-27 PROCEDURE — 1123F ACP DISCUSS/DSCN MKR DOCD: CPT | Performed by: OTOLARYNGOLOGY

## 2024-11-27 PROCEDURE — 99214 OFFICE O/P EST MOD 30 MIN: CPT | Performed by: OTOLARYNGOLOGY

## 2024-11-27 NOTE — PROGRESS NOTES
CHIEF COMPLAINT  Chief Complaint   Patient presents with    Flare up of Meniere's disease       HISTORY OF PRESENT ILLNESS       Henri Sellers is a 69 y.o. male.  Patient stated that he \"had a bad episode of Meniere's Thursday about 5 PM.  I had to drive home.  It lasted about 12 hours.  I felt bad on Friday and stayed home from work.  I felt achy.  I felt Better on Saturday.  My left ear was clogged and has gotten better.  I Had a lot salt popcorn the day of onset.\"  Has MRI on December 12.  Tried alprazolam/Xanax in the past and \"it did not help dizziness and made me feel bad.  I didn't like the feeling.\"  He reported a Sinus headache right side above and below eye, white of eye has ecchymosis.  Pain in the right nostril when breathe in.  Called Dr. Herrera and he treated with antibiotics about one week ago.  No other major episodes for 3-4 years       REVIEW OF SYSTEMS  Constitutional:  Denied fever and chills.  ENT/sinus:  Denied otalgia, otorrhea, nasal pain, rhinorrhea, sore throat, and sinus/facial pain.        EXAMINATION    Vitals:    11/27/24 0931   BP: (!) 149/77   Site: Left Upper Arm   Position: Sitting   Cuff Size: Large Adult   Pulse: 74   Temp: 97.5 °F (36.4 °C)   TempSrc: Infrared   SpO2: 97%   Weight: 80.7 kg (178 lb)   Height: 1.753 m (5' 9.02\")     WDWN, NAD  Face:  Normal skin.    Voice:  Normal, with no hoarseness, breathiness, or hot potato quality.  Ears:   TMs and EACs were normal.  The mastoids and pinnae were normal.  Binaural binocular otomicroscopy was performed.    Nose:  Normal.    Sinuses: Nontender x 4   Throat,  OC/OP:  Normal.    Neck:  NT, No masses.  Trachea midline.    Nodes:  No lymphadenopathy.       I performed this physical exam personally.        IMPRESSION / DIAGNOSES / ORDERS:   Henri was seen today for flare up of meniere's disease.    Diagnoses and all orders for this visit:    Meniere's disease, cochleovestibular, active, unspecified laterality    Asymmetrical

## 2024-12-02 ENCOUNTER — OFFICE VISIT (OUTPATIENT)
Dept: FAMILY MEDICINE CLINIC | Age: 69
End: 2024-12-02
Payer: COMMERCIAL

## 2024-12-02 VITALS
SYSTOLIC BLOOD PRESSURE: 122 MMHG | WEIGHT: 180 LBS | RESPIRATION RATE: 12 BRPM | OXYGEN SATURATION: 95 % | DIASTOLIC BLOOD PRESSURE: 66 MMHG | BODY MASS INDEX: 26.57 KG/M2 | HEART RATE: 95 BPM | TEMPERATURE: 97.4 F

## 2024-12-02 DIAGNOSIS — E78.2 MIXED HYPERLIPIDEMIA: ICD-10-CM

## 2024-12-02 DIAGNOSIS — Z01.818 PREOP EXAMINATION: ICD-10-CM

## 2024-12-02 DIAGNOSIS — R97.20 ELEVATED PSA: Primary | ICD-10-CM

## 2024-12-02 DIAGNOSIS — M15.0 PRIMARY OSTEOARTHRITIS INVOLVING MULTIPLE JOINTS: ICD-10-CM

## 2024-12-02 PROCEDURE — 93000 ELECTROCARDIOGRAM COMPLETE: CPT | Performed by: FAMILY MEDICINE

## 2024-12-02 PROCEDURE — 1123F ACP DISCUSS/DSCN MKR DOCD: CPT | Performed by: FAMILY MEDICINE

## 2024-12-02 PROCEDURE — 99213 OFFICE O/P EST LOW 20 MIN: CPT | Performed by: FAMILY MEDICINE

## 2024-12-02 NOTE — PROGRESS NOTES
Preoperative Consultation    Henri ARCHER [place]  YOB: 1955    This patient presents to the office today for a preoperative consultation at the request of surgeon, Dr. Garcia, who plans on performing prostate biopsy on December 12 at [facility].      Planned anesthesia: General and IV sedation   Known anesthesia problems: None   Bleeding risk: No recent or remote history of abnormal bleeding  Personal or FH of DVT/PE: No      Patient Active Problem List   Diagnosis    Tinnitus    Meniere's disease, cochleovestibular, active    Mixed hyperlipidemia    Primary osteoarthritis involving multiple joints    Other male erectile dysfunction     Past Surgical History:   Procedure Laterality Date    BLEPHAROPLASTY Bilateral 2/14/2019    BILATERAL LOWER EYE BLEPHAROPLASTY performed by Tom Colindres MD at [facility] OR    INGUINAL HERNIA REPAIR Right     KNEE ARTHROSCOPY Left     OTHER SURGICAL HISTORY Right 11/04/2016    Right rotator cuff repair    [place] LAPAROSCOPY SURG RPR INITIAL INGUINAL HERNIA Left 10/30/2018    ROBOT ASSISTED LAPAROSCOPIC LEFT INGUINAL HERNIA REPAIR WITH MESH AND OPEN UMBILICAL HERNIA REPAIR performed by Samm Jain MD at [facility] OR    SHOULDER SURGERY Right     rotator cuff    VASECTOMY         Allergies   Allergen Reactions    Seasonal      sneezing     Outpatient Medications Marked as Taking for the 12/2/24 encounter (Office Visit) with Leobardo Herrera MD   Medication Sig Dispense Refill    atorvastatin (LIPITOR) 40 MG tablet TAKE 1 TABLET BY MOUTH EVERY DAY 90 tablet 3    triamterene-hydroCHLOROthiazide (DYAZIDE) 37.5-25 MG per capsule TAKE 1 CAP BY MOUTH DAILY 90 capsule 3    sildenafil (VIAGRA) 100 MG tablet Take 1 tablet by mouth daily as needed for Erectile Dysfunction 10 tablet 5       Social History     Tobacco Use    Smoking status: Never    Smokeless tobacco: Never   Substance Use Topics    Alcohol use: Yes     Comment: occ alcohol use/ weekends 3-4 drinks     Family History

## 2024-12-05 ENCOUNTER — TELEPHONE (OUTPATIENT)
Dept: ENT CLINIC | Age: 69
End: 2024-12-05

## 2024-12-05 NOTE — TELEPHONE ENCOUNTER
[employer] fax # [phone] needs last OV for the MRI to get Prior auth, please sign note so it can be faxed over.

## 2024-12-20 ENCOUNTER — TELEPHONE (OUTPATIENT)
Dept: ENT CLINIC | Age: 69
End: 2024-12-20

## 2024-12-20 NOTE — TELEPHONE ENCOUNTER
Please call patient and advised that the MRI scan of the IACs showed aging effects of the brain and no evidence of vestibular schwannoma.

## 2025-05-14 DIAGNOSIS — H81.09 MENIERE'S DISEASE, COCHLEOVESTIBULAR, ACTIVE, UNSPECIFIED LATERALITY: Chronic | ICD-10-CM

## 2025-05-14 RX ORDER — TRIAMTERENE AND HYDROCHLOROTHIAZIDE 37.5; 25 MG/1; MG/1
CAPSULE ORAL DAILY
Qty: 90 CAPSULE | Refills: 3 | OUTPATIENT
Start: 2025-05-14

## 2025-08-18 ENCOUNTER — OFFICE VISIT (OUTPATIENT)
Dept: FAMILY MEDICINE CLINIC | Age: 70
End: 2025-08-18
Payer: COMMERCIAL

## 2025-08-18 VITALS
BODY MASS INDEX: 26.15 KG/M2 | HEART RATE: 81 BPM | SYSTOLIC BLOOD PRESSURE: 126 MMHG | TEMPERATURE: 98.2 F | OXYGEN SATURATION: 98 % | WEIGHT: 177.2 LBS | DIASTOLIC BLOOD PRESSURE: 77 MMHG

## 2025-08-18 DIAGNOSIS — M70.31 BURSITIS OF OTHER BURSA OF RIGHT ELBOW: Primary | ICD-10-CM

## 2025-08-18 DIAGNOSIS — R97.20 ELEVATED PSA: Primary | ICD-10-CM

## 2025-08-18 DIAGNOSIS — Z12.5 PROSTATE CANCER SCREENING: ICD-10-CM

## 2025-08-18 DIAGNOSIS — R73.9 HYPERGLYCEMIA: ICD-10-CM

## 2025-08-18 DIAGNOSIS — E78.2 MIXED HYPERLIPIDEMIA: ICD-10-CM

## 2025-08-18 PROCEDURE — 99213 OFFICE O/P EST LOW 20 MIN: CPT | Performed by: NURSE PRACTITIONER

## 2025-08-18 PROCEDURE — 1123F ACP DISCUSS/DSCN MKR DOCD: CPT | Performed by: NURSE PRACTITIONER

## 2025-08-18 RX ORDER — METHYLPREDNISOLONE 4 MG/1
TABLET ORAL
Qty: 1 KIT | Refills: 0 | Status: SHIPPED | OUTPATIENT
Start: 2025-08-18 | End: 2025-08-24

## 2025-08-18 SDOH — ECONOMIC STABILITY: FOOD INSECURITY: WITHIN THE PAST 12 MONTHS, YOU WORRIED THAT YOUR FOOD WOULD RUN OUT BEFORE YOU GOT MONEY TO BUY MORE.: NEVER TRUE

## 2025-08-18 SDOH — ECONOMIC STABILITY: FOOD INSECURITY: WITHIN THE PAST 12 MONTHS, THE FOOD YOU BOUGHT JUST DIDN'T LAST AND YOU DIDN'T HAVE MONEY TO GET MORE.: NEVER TRUE

## 2025-08-18 ASSESSMENT — PATIENT HEALTH QUESTIONNAIRE - PHQ9
1. LITTLE INTEREST OR PLEASURE IN DOING THINGS: NOT AT ALL
SUM OF ALL RESPONSES TO PHQ QUESTIONS 1-9: 0
2. FEELING DOWN, DEPRESSED OR HOPELESS: NOT AT ALL
SUM OF ALL RESPONSES TO PHQ QUESTIONS 1-9: 0

## 2025-08-21 ENCOUNTER — OFFICE VISIT (OUTPATIENT)
Dept: ENT CLINIC | Age: 70
End: 2025-08-21
Payer: COMMERCIAL

## 2025-08-21 VITALS
HEIGHT: 69 IN | OXYGEN SATURATION: 98 % | BODY MASS INDEX: 26.07 KG/M2 | SYSTOLIC BLOOD PRESSURE: 162 MMHG | WEIGHT: 176 LBS | TEMPERATURE: 98.1 F | HEART RATE: 73 BPM | DIASTOLIC BLOOD PRESSURE: 68 MMHG

## 2025-08-21 DIAGNOSIS — Z12.5 PROSTATE CANCER SCREENING: ICD-10-CM

## 2025-08-21 DIAGNOSIS — H90.A22 SENSORINEURAL HEARING LOSS (SNHL) OF LEFT EAR WITH RESTRICTED HEARING OF RIGHT EAR: Primary | ICD-10-CM

## 2025-08-21 DIAGNOSIS — R97.20 ELEVATED PSA: ICD-10-CM

## 2025-08-21 DIAGNOSIS — H81.09 MENIERE'S DISEASE, COCHLEOVESTIBULAR, ACTIVE, UNSPECIFIED LATERALITY: ICD-10-CM

## 2025-08-21 DIAGNOSIS — Z51.81 ENCOUNTER FOR MONITORING DIURETIC THERAPY: ICD-10-CM

## 2025-08-21 DIAGNOSIS — H93.13 TINNITUS, BILATERAL: ICD-10-CM

## 2025-08-21 DIAGNOSIS — Z79.899 ENCOUNTER FOR MONITORING DIURETIC THERAPY: ICD-10-CM

## 2025-08-21 DIAGNOSIS — H90.3 ASYMMETRICAL SENSORINEURAL HEARING LOSS: ICD-10-CM

## 2025-08-21 DIAGNOSIS — G47.30 SLEEP-DISORDERED BREATHING: ICD-10-CM

## 2025-08-21 PROCEDURE — 1123F ACP DISCUSS/DSCN MKR DOCD: CPT | Performed by: OTOLARYNGOLOGY

## 2025-08-21 PROCEDURE — 99214 OFFICE O/P EST MOD 30 MIN: CPT | Performed by: OTOLARYNGOLOGY

## 2025-08-21 RX ORDER — TRIAMTERENE AND HYDROCHLOROTHIAZIDE 37.5; 25 MG/1; MG/1
1 CAPSULE ORAL DAILY
Qty: 30 CAPSULE | Refills: 3 | Status: SHIPPED | OUTPATIENT
Start: 2025-08-21 | End: 2025-08-22

## 2025-08-22 LAB
ANION GAP SERPL CALCULATED.3IONS-SCNC: 12 MMOL/L (ref 3–16)
BUN SERPL-MCNC: 19 MG/DL (ref 7–20)
CALCIUM SERPL-MCNC: 9.7 MG/DL (ref 8.3–10.6)
CHLORIDE SERPL-SCNC: 98 MMOL/L (ref 99–110)
CO2 SERPL-SCNC: 29 MMOL/L (ref 21–32)
CREAT SERPL-MCNC: 0.8 MG/DL (ref 0.8–1.3)
GFR SERPLBLD CREATININE-BSD FMLA CKD-EPI: >90 ML/MIN/{1.73_M2}
GLUCOSE SERPL-MCNC: 85 MG/DL (ref 70–99)
POTASSIUM SERPL-SCNC: 3.8 MMOL/L (ref 3.5–5.1)
SODIUM SERPL-SCNC: 139 MMOL/L (ref 136–145)

## 2025-08-25 PROBLEM — G47.30 SLEEP-DISORDERED BREATHING: Status: ACTIVE | Noted: 2025-08-25

## (undated) DEVICE — ROBOTIC PK

## (undated) DEVICE — SKIN MARKER,REGULAR TIP WITH RULER: Brand: DEVON

## (undated) DEVICE — ROOM TURNOVER KIT W/ ARM STRP

## (undated) DEVICE — GLOVE SURG SZ 65 THK91MIL LTX FREE SYN POLYISOPRENE

## (undated) DEVICE — SOLUTION ANTIFOG VIS SYS CLEARIFY LAPSCP

## (undated) DEVICE — MASTISOL ADHESIVE LIQ 2/3ML

## (undated) DEVICE — SUTURE PROL SZ 0 L18IN NONABSORBABLE BLU L36MM CT-1 1/2 CIR C821G

## (undated) DEVICE — SPONGE OPHTH 10 ENV LT BLU SHFT TRIANG PT TIP EYE SPEAR LINT

## (undated) DEVICE — 3M™ STERI-STRIP™ REINFORCED ADHESIVE SKIN CLOSURES, R1546, 1/4 IN X 4 IN (6 MM X 100 MM), 10 STRIPS/ENVELOPE: Brand: 3M™ STERI-STRIP™

## (undated) DEVICE — 3M™ IOBAN™ 2 ANTIMICROBIAL INCISE DRAPE 6650EZ: Brand: IOBAN™ 2

## (undated) DEVICE — ARM DRAPE

## (undated) DEVICE — [HIGH FLOW HEATED INSUFFLATOR TUBING,  DO NOT USE IF PACKAGE IS DAMAGED]

## (undated) DEVICE — CHLORAPREP 26ML ORANGE

## (undated) DEVICE — INTENDED FOR TISSUE SEPARATION, AND OTHER PROCEDURES THAT REQUIRE A SHARP SURGICAL BLADE TO PUNCTURE OR CUT.: Brand: BARD-PARKER ® STAINLESS STEEL BLADES

## (undated) DEVICE — STERILE POLYISOPRENE POWDER-FREE SURGICAL GLOVES: Brand: PROTEXIS

## (undated) DEVICE — TOWEL,OR,DSP,ST,BLUE,STD,4/PK,20PK/CS: Brand: MEDLINE

## (undated) DEVICE — LIGHT HANDLE: Brand: DEVON

## (undated) DEVICE — INSUFFLATION NEEDLE TO ESTABLISH PNEUMOPERITONEUM.: Brand: INSUFFLATION NEEDLE

## (undated) DEVICE — SUTURE PROL SZ 5-0 L18IN NONABSORBABLE BLU L13MM PC-1 3/8 8618G

## (undated) DEVICE — BLADELESS OBTURATOR: Brand: WECK VISTA

## (undated) DEVICE — MEDICINE CUP, GRADUATED, STER: Brand: MEDLINE

## (undated) DEVICE — CANNULA SEAL

## (undated) DEVICE — 6 ML SYRINGE LUER-LOCK TIP: Brand: MONOJECT

## (undated) DEVICE — TIP COVER ACCESSORY

## (undated) DEVICE — SUTURE VCRL SZ 6-0 L18IN ABSRB UD L13MM P-3 3/8 CIR PRIM J492H

## (undated) DEVICE — GAUZE,SPONGE,4"X4",8PLY,STRL,LF,10/TRAY: Brand: MEDLINE

## (undated) DEVICE — 3 ML SYRINGE LUER-LOCK TIP: Brand: MONOJECT

## (undated) DEVICE — KIT OR ROOM TURNOVER W/STRAP

## (undated) DEVICE — SUTURE PROL SZ 6-0 L18IN NONABSORBABLE BLU L13MM PC-1 3/8 8617G

## (undated) DEVICE — CATHETER TRAY 16 FR 5 CC FOL ANTIREFLX SAMPLING PRT DOVER

## (undated) DEVICE — PAD TBL OP RM TRENDELENBURG STATIC TORSO W/STRAPS

## (undated) DEVICE — HYPODERMIC SAFETY NEEDLE: Brand: MAGELLAN

## (undated) DEVICE — Z INACTIVE PAD EYE W1625XL2625IN COT FILL ABSRB W FN MESH GZ OVL

## (undated) DEVICE — SUTURE STRATAFIX SPRL PDS + SZ 2 0 L6IN ABSRB VLT CT 2 L26MM SXPP1B413

## (undated) DEVICE — NEEDLE HYPO 30GA L1IN BGE POLYPR HUB S STL REG BVL STR W/O

## (undated) DEVICE — SUTURE VCRL SZ 3-0 L27IN ABSRB UD L26MM SH 1/2 CIR J416H

## (undated) DEVICE — GOWN,AURORA,NONREINF,RAGLAN,XXL,STERILE: Brand: MEDLINE

## (undated) DEVICE — ELECTRODE ELECSURG NDL 2.8 INX7.2 CM COAT INSUL EDGE

## (undated) DEVICE — SOLUTION IV IRRIG 500ML 0.9% SODIUM CHL 2F7123

## (undated) DEVICE — PACK PROCEDURE SURG EXTREMITY MFFOP CUST

## (undated) DEVICE — ELECTRODE PT RET AD L9FT HI MOIST COND ADH HYDRGEL CORDED

## (undated) DEVICE — SUTURE VCRL SZ 4-0 L18IN ABSRB UD L19MM PS-2 3/8 CIR PRIM J496H